# Patient Record
Sex: MALE | Race: WHITE | NOT HISPANIC OR LATINO | Employment: OTHER | URBAN - METROPOLITAN AREA
[De-identification: names, ages, dates, MRNs, and addresses within clinical notes are randomized per-mention and may not be internally consistent; named-entity substitution may affect disease eponyms.]

---

## 2017-06-08 ENCOUNTER — OFFICE VISIT - HEALTHEAST (OUTPATIENT)
Dept: INTERNAL MEDICINE | Facility: CLINIC | Age: 67
End: 2017-06-08

## 2017-06-08 DIAGNOSIS — B19.20 UNSPECIFIED VIRAL HEPATITIS C WITHOUT HEPATIC COMA: ICD-10-CM

## 2017-06-08 DIAGNOSIS — R03.0 ELEVATED BP WITHOUT DIAGNOSIS OF HYPERTENSION: ICD-10-CM

## 2017-06-08 DIAGNOSIS — R51.9 HEADACHE: ICD-10-CM

## 2017-06-08 DIAGNOSIS — R09.81 SINUS CONGESTION: ICD-10-CM

## 2017-06-08 DIAGNOSIS — E07.9 THYROID CONDITION: ICD-10-CM

## 2017-07-05 ENCOUNTER — AMBULATORY - HEALTHEAST (OUTPATIENT)
Dept: LAB | Facility: CLINIC | Age: 67
End: 2017-07-05

## 2017-07-05 DIAGNOSIS — R79.89 INCREASED FOLLICLE STIMULATING HORMONE (FSH) LEVEL: ICD-10-CM

## 2017-07-05 DIAGNOSIS — R53.83 OTHER MALAISE AND FATIGUE: ICD-10-CM

## 2017-07-05 DIAGNOSIS — R53.81 OTHER MALAISE AND FATIGUE: ICD-10-CM

## 2017-07-19 ENCOUNTER — AMBULATORY - HEALTHEAST (OUTPATIENT)
Dept: LAB | Facility: CLINIC | Age: 67
End: 2017-07-19

## 2017-07-19 DIAGNOSIS — M54.50 LUMBAGO: ICD-10-CM

## 2017-07-19 DIAGNOSIS — R27.0 ATAXIA: ICD-10-CM

## 2017-07-19 DIAGNOSIS — R68.82 DECREASED LIBIDO: ICD-10-CM

## 2017-07-19 DIAGNOSIS — R53.83 OTHER MALAISE AND FATIGUE: ICD-10-CM

## 2017-07-19 DIAGNOSIS — G44.89 ALLERGIC HEADACHE: ICD-10-CM

## 2017-07-19 DIAGNOSIS — R53.81 OTHER MALAISE AND FATIGUE: ICD-10-CM

## 2017-07-21 LAB
MISCELLANEOUS TEST DEPT. - HE HISTORICAL: NORMAL
PERFORMING LAB: NORMAL
SPECIMEN STATUS: NORMAL
TEST NAME: NORMAL

## 2017-07-25 ENCOUNTER — AMBULATORY - HEALTHEAST (OUTPATIENT)
Dept: LAB | Facility: CLINIC | Age: 67
End: 2017-07-25

## 2017-07-25 DIAGNOSIS — M54.50 LUMBAGO: ICD-10-CM

## 2017-07-25 DIAGNOSIS — G44.89 ALLERGIC HEADACHE: ICD-10-CM

## 2017-07-25 DIAGNOSIS — R27.0 ATAXIA: ICD-10-CM

## 2017-07-25 DIAGNOSIS — R53.83 FATIGUE: ICD-10-CM

## 2017-07-25 DIAGNOSIS — R68.82 DECREASED LIBIDO: ICD-10-CM

## 2017-07-25 LAB
IGA SERPL-MCNC: 138 MG/DL (ref 65–400)
MISCELLANEOUS TEST DEPT. - HE HISTORICAL: NORMAL
PERFORMING LAB: NORMAL
PSA SERPL-MCNC: 0.4 NG/ML (ref 0–4.5)
SPECIMEN STATUS: NORMAL
TEST NAME: NORMAL

## 2017-07-26 LAB
CHROMOGRANIN A, S - HISTORICAL: 53 NG/ML
HBA1C MFR BLD: 5.6 % (ref 4.2–6.1)
MISCELLANEOUS TEST DEPT. - HE HISTORICAL: NORMAL
PERFORMING LAB: NORMAL
SPECIMEN STATUS: NORMAL
TEST NAME: NORMAL

## 2017-07-28 LAB — TOTAL IGE - HISTORICAL: 4.27 KU/L (ref 0–100)

## 2017-07-29 LAB
MISCELLANEOUS TEST DEPT. - HE HISTORICAL: NORMAL
MISCELLANEOUS TEST DEPT. - HE HISTORICAL: NORMAL
PERFORMING LAB: NORMAL
PERFORMING LAB: NORMAL
SPECIMEN STATUS: NORMAL
SPECIMEN STATUS: NORMAL
TEST NAME: NORMAL
TEST NAME: NORMAL

## 2018-07-02 ENCOUNTER — AMBULATORY - HEALTHEAST (OUTPATIENT)
Dept: LAB | Facility: CLINIC | Age: 68
End: 2018-07-02

## 2018-07-02 DIAGNOSIS — G44.89 ALLERGIC HEADACHE: ICD-10-CM

## 2018-07-02 DIAGNOSIS — R89.8 ABNORMAL KARYOTYPE: ICD-10-CM

## 2018-07-02 DIAGNOSIS — R89.1 ABNORMAL LEVEL OF HORMONES IN SPECIMENS FROM OTH ORG/TISS: ICD-10-CM

## 2018-07-02 DIAGNOSIS — R53.83 FATIGUE: ICD-10-CM

## 2018-07-02 LAB
25(OH)D3 SERPL-MCNC: 53.2 NG/ML (ref 30–80)
CORTIS SERPL-MCNC: 9.1 UG/DL

## 2018-07-03 LAB — DHEA-S SERPL-MCNC: 238 UG/DL (ref 28–175)

## 2018-07-05 LAB
AC/FC RATIO: 0.2 (ref 0.1–0.8)
ACYLCARNITINE (AC): 14 NMOL/ML (ref 5–30)
FREE (FC): 60 NMOL/ML (ref 25–54)
GLIADIN IGA SER-ACNC: 0.6 U/ML
GLIADIN IGG SER-ACNC: <0.4 U/ML
INTERPRETATION: ABNORMAL
TOTAL: 74 NMOL/ML (ref 34–78)
TTG IGA SER-ACNC: 0.1 U/ML
TTG IGG SER-ACNC: <0.6 U/ML

## 2019-10-01 ENCOUNTER — HEALTH MAINTENANCE LETTER (OUTPATIENT)
Age: 69
End: 2019-10-01

## 2019-12-15 ENCOUNTER — HEALTH MAINTENANCE LETTER (OUTPATIENT)
Age: 69
End: 2019-12-15

## 2020-02-20 ENCOUNTER — COMMUNICATION - HEALTHEAST (OUTPATIENT)
Dept: FAMILY MEDICINE | Facility: CLINIC | Age: 70
End: 2020-02-20

## 2020-02-20 ENCOUNTER — OFFICE VISIT - HEALTHEAST (OUTPATIENT)
Dept: FAMILY MEDICINE | Facility: CLINIC | Age: 70
End: 2020-02-20

## 2020-02-20 DIAGNOSIS — E07.9 THYROID CONDITION: ICD-10-CM

## 2020-02-20 DIAGNOSIS — Z01.818 PREOP EXAMINATION: ICD-10-CM

## 2020-02-20 DIAGNOSIS — I10 ESSENTIAL HYPERTENSION: ICD-10-CM

## 2020-02-20 DIAGNOSIS — H25.013 CORTICAL AGE-RELATED CATARACT OF BOTH EYES: ICD-10-CM

## 2020-02-20 DIAGNOSIS — F11.20 OPIOID TYPE DEPENDENCE, CONTINUOUS (H): ICD-10-CM

## 2020-02-20 DIAGNOSIS — H43.819 PVD (POSTERIOR VITREOUS DETACHMENT), UNSPECIFIED LATERALITY: ICD-10-CM

## 2020-02-20 DIAGNOSIS — Z12.11 SPECIAL SCREENING FOR MALIGNANT NEOPLASMS, COLON: ICD-10-CM

## 2020-02-20 DIAGNOSIS — Z13.220 LIPID SCREENING: ICD-10-CM

## 2020-02-20 DIAGNOSIS — B18.2 CHRONIC HEPATITIS C WITHOUT HEPATIC COMA (H): ICD-10-CM

## 2020-02-20 LAB
ALBUMIN SERPL-MCNC: 4.3 G/DL (ref 3.5–5)
ALP SERPL-CCNC: 74 U/L (ref 45–120)
ALT SERPL W P-5'-P-CCNC: 25 U/L (ref 0–45)
ANION GAP SERPL CALCULATED.3IONS-SCNC: 6 MMOL/L (ref 5–18)
AST SERPL W P-5'-P-CCNC: 19 U/L (ref 0–40)
ATRIAL RATE - MUSE: 74 BPM
BILIRUB SERPL-MCNC: 1.1 MG/DL (ref 0–1)
BUN SERPL-MCNC: 21 MG/DL (ref 8–28)
CALCIUM SERPL-MCNC: 10 MG/DL (ref 8.5–10.5)
CHLORIDE BLD-SCNC: 101 MMOL/L (ref 98–107)
CHOLEST SERPL-MCNC: 255 MG/DL
CO2 SERPL-SCNC: 34 MMOL/L (ref 22–31)
CREAT SERPL-MCNC: 1.12 MG/DL (ref 0.7–1.3)
DIASTOLIC BLOOD PRESSURE - MUSE: NORMAL
ERYTHROCYTE [DISTWIDTH] IN BLOOD BY AUTOMATED COUNT: 11.9 % (ref 11–14.5)
FASTING STATUS PATIENT QL REPORTED: YES
GFR SERPL CREATININE-BSD FRML MDRD: >60 ML/MIN/1.73M2
GLUCOSE BLD-MCNC: 105 MG/DL (ref 70–125)
HCT VFR BLD AUTO: 47.2 % (ref 40–54)
HDLC SERPL-MCNC: 50 MG/DL
HGB BLD-MCNC: 15.9 G/DL (ref 14–18)
INTERPRETATION ECG - MUSE: NORMAL
LDLC SERPL CALC-MCNC: 186 MG/DL
MCH RBC QN AUTO: 30.5 PG (ref 27–34)
MCHC RBC AUTO-ENTMCNC: 33.6 G/DL (ref 32–36)
MCV RBC AUTO: 91 FL (ref 80–100)
P AXIS - MUSE: 20 DEGREES
PLATELET # BLD AUTO: 261 THOU/UL (ref 140–440)
PMV BLD AUTO: 7 FL (ref 7–10)
POTASSIUM BLD-SCNC: 5.3 MMOL/L (ref 3.5–5)
PR INTERVAL - MUSE: 130 MS
PROT SERPL-MCNC: 7.6 G/DL (ref 6–8)
QRS DURATION - MUSE: 90 MS
QT - MUSE: 366 MS
QTC - MUSE: 406 MS
R AXIS - MUSE: 29 DEGREES
RBC # BLD AUTO: 5.21 MILL/UL (ref 4.4–6.2)
SODIUM SERPL-SCNC: 141 MMOL/L (ref 136–145)
SYSTOLIC BLOOD PRESSURE - MUSE: NORMAL
T AXIS - MUSE: 27 DEGREES
TRIGL SERPL-MCNC: 95 MG/DL
TSH SERPL DL<=0.005 MIU/L-ACNC: 2.57 UIU/ML (ref 0.3–5)
VENTRICULAR RATE- MUSE: 74 BPM
WBC: 5.1 THOU/UL (ref 4–11)

## 2020-02-20 ASSESSMENT — MIFFLIN-ST. JEOR: SCORE: 1655.56

## 2020-02-26 ENCOUNTER — COMMUNICATION - HEALTHEAST (OUTPATIENT)
Dept: FAMILY MEDICINE | Facility: CLINIC | Age: 70
End: 2020-02-26

## 2020-02-26 ENCOUNTER — COMMUNICATION - HEALTHEAST (OUTPATIENT)
Dept: SCHEDULING | Facility: CLINIC | Age: 70
End: 2020-02-26

## 2020-02-26 ENCOUNTER — OFFICE VISIT - HEALTHEAST (OUTPATIENT)
Dept: FAMILY MEDICINE | Facility: CLINIC | Age: 70
End: 2020-02-26

## 2020-02-26 DIAGNOSIS — R42 VERTIGO: ICD-10-CM

## 2020-02-26 LAB
ANION GAP SERPL CALCULATED.3IONS-SCNC: 11 MMOL/L (ref 5–18)
BUN SERPL-MCNC: 21 MG/DL (ref 8–28)
CALCIUM SERPL-MCNC: 9.7 MG/DL (ref 8.5–10.5)
CHLORIDE BLD-SCNC: 103 MMOL/L (ref 98–107)
CO2 SERPL-SCNC: 26 MMOL/L (ref 22–31)
CREAT SERPL-MCNC: 0.97 MG/DL (ref 0.7–1.3)
GFR SERPL CREATININE-BSD FRML MDRD: >60 ML/MIN/1.73M2
GLUCOSE BLD-MCNC: 126 MG/DL (ref 70–125)
POTASSIUM BLD-SCNC: 3.9 MMOL/L (ref 3.5–5)
SODIUM SERPL-SCNC: 140 MMOL/L (ref 136–145)

## 2020-07-16 ENCOUNTER — COMMUNICATION - HEALTHEAST (OUTPATIENT)
Dept: FAMILY MEDICINE | Facility: CLINIC | Age: 70
End: 2020-07-16

## 2020-07-16 DIAGNOSIS — I10 ESSENTIAL HYPERTENSION: ICD-10-CM

## 2020-10-28 ENCOUNTER — COMMUNICATION - HEALTHEAST (OUTPATIENT)
Dept: FAMILY MEDICINE | Facility: CLINIC | Age: 70
End: 2020-10-28

## 2020-10-28 DIAGNOSIS — I10 ESSENTIAL HYPERTENSION: ICD-10-CM

## 2021-01-15 ENCOUNTER — HEALTH MAINTENANCE LETTER (OUTPATIENT)
Age: 71
End: 2021-01-15

## 2021-02-11 ENCOUNTER — TRANSFERRED RECORDS (OUTPATIENT)
Dept: HEALTH INFORMATION MANAGEMENT | Facility: CLINIC | Age: 71
End: 2021-02-11

## 2021-02-15 ENCOUNTER — COMMUNICATION - HEALTHEAST (OUTPATIENT)
Dept: FAMILY MEDICINE | Facility: CLINIC | Age: 71
End: 2021-02-15

## 2021-04-13 ENCOUNTER — TRANSFERRED RECORDS (OUTPATIENT)
Dept: HEALTH INFORMATION MANAGEMENT | Facility: CLINIC | Age: 71
End: 2021-04-13

## 2021-04-27 ENCOUNTER — TRANSFERRED RECORDS (OUTPATIENT)
Dept: HEALTH INFORMATION MANAGEMENT | Facility: CLINIC | Age: 71
End: 2021-04-27

## 2021-05-26 ENCOUNTER — RECORDS - HEALTHEAST (OUTPATIENT)
Dept: ADMINISTRATIVE | Facility: CLINIC | Age: 71
End: 2021-05-26

## 2021-05-26 ENCOUNTER — TRANSFERRED RECORDS (OUTPATIENT)
Dept: HEALTH INFORMATION MANAGEMENT | Facility: CLINIC | Age: 71
End: 2021-05-26

## 2021-05-27 ENCOUNTER — RECORDS - HEALTHEAST (OUTPATIENT)
Dept: ADMINISTRATIVE | Facility: CLINIC | Age: 71
End: 2021-05-27

## 2021-05-28 ENCOUNTER — RECORDS - HEALTHEAST (OUTPATIENT)
Dept: ADMINISTRATIVE | Facility: CLINIC | Age: 71
End: 2021-05-28

## 2021-05-29 ENCOUNTER — RECORDS - HEALTHEAST (OUTPATIENT)
Dept: ADMINISTRATIVE | Facility: CLINIC | Age: 71
End: 2021-05-29

## 2021-05-30 ENCOUNTER — RECORDS - HEALTHEAST (OUTPATIENT)
Dept: ADMINISTRATIVE | Facility: CLINIC | Age: 71
End: 2021-05-30

## 2021-06-04 VITALS
HEART RATE: 80 BPM | HEIGHT: 69 IN | SYSTOLIC BLOOD PRESSURE: 160 MMHG | WEIGHT: 204.6 LBS | DIASTOLIC BLOOD PRESSURE: 90 MMHG | BODY MASS INDEX: 30.3 KG/M2

## 2021-06-04 VITALS
SYSTOLIC BLOOD PRESSURE: 134 MMHG | DIASTOLIC BLOOD PRESSURE: 76 MMHG | BODY MASS INDEX: 30.26 KG/M2 | WEIGHT: 202 LBS | HEART RATE: 92 BPM

## 2021-06-06 NOTE — TELEPHONE ENCOUNTER
"  Able to get patient scheduled for an appointment at 2:20p today.  [facility] NURSE ADVISORS  Reason for Disposition    Spinning or tilting sensation (vertigo) present now    Answer Assessment - Initial Assessment Questions  1. DESCRIPTION: \"Describe your dizziness.\"      Vertigo off and on  2. LIGHTHEADED: \"Do you feel lightheaded?\" (e.g., somewhat faint, woozy, weak upon standing)      Not really  3. VERTIGO: \"Do you feel like either you or the room is spinning or tilting?\" (i.e. vertigo)      yes  4. SEVERITY: \"How bad is it?\"  \"Do you feel like you are going to faint?\" \"Can you stand and walk?\"    - MILD - walking normally    - MODERATE - interferes with normal activities (e.g., work, school)     - SEVERE - unable to stand, requires support to walk, feels like passing out now.       moderate  5. ONSET:  \"When did the dizziness begin?\"      Yesterday morning  6. AGGRAVATING FACTORS: \"Does anything make it worse?\" (e.g., standing, change in head position)      Head movement or lying down and getting up   7. HEART RATE: \"Can you tell me your heart rate?\" \"How many beats in 15 seconds?\"  (Note: not all patients can do this)        normal  8. CAUSE: \"What do you think is causing the dizziness?\"      Head movement  9. RECURRENT SYMPTOM: \"Have you had dizziness before?\" If so, ask: \"When was the last time?\" \"What happened that time?\"      none  10. OTHER SYMPTOMS: \"Do you have any other symptoms?\" (e.g., fever, chest pain, vomiting, diarrhea, bleeding)        no  11. PREGNANCY: \"Is there any chance you are pregnant?\" \"When was your last menstrual period?\"        no    Protocols used: DIZZINESS-A-OH      "

## 2021-06-06 NOTE — TELEPHONE ENCOUNTER
Reached out to patient and left a message to return phone call. Please relay the below message to patient when he calls back. Thank you,   Sisys La

## 2021-06-06 NOTE — TELEPHONE ENCOUNTER
Adv patient to be seen either at urgent care or clinic visit    Heidi Chairez MD 2/26/2020 1:55 PM

## 2021-06-06 NOTE — TELEPHONE ENCOUNTER
RN cannot approve Refill Request    Requesting 90 day supply    RN can NOT refill this medication PCP messaged that patient is overdue for Labs. Last office visit: 2/20/2020 Heidi Chairez MD Last Physical: Visit date not found Last MTM visit: Visit date not found Last visit same specialty: 2/20/2020 Heidi Chairez MD.  Next visit within 3 mo: Visit date not found  Next physical within 3 mo: Visit date not found      Leonor Trivedi, [facility] Triage/Med Refill 2/24/2020    Requested Prescriptions   Pending Prescriptions Disp Refills     lisinopriL (PRINIVIL,ZESTRIL) 10 MG tablet [Pharmacy Med Name: LISINOPRIL 10MG TABLETS] 90 tablet 0     Sig: TAKE 1 TABLET BY MOUTH DAILY       Ace Inhibitors Refill Protocol Failed - 2/20/2020  2:07 PM        Failed - Serum Potassium in past 12 months     Lab Results   Component Value Date    Potassium 5.3 (H) 02/20/2020             Failed - Serum Creatinine in past 12 months     Creatinine   Date Value Ref Range Status   02/20/2020 1.12 0.70 - 1.30 mg/dL Final             Passed - PCP or prescribing provider visit in past 12 months       Last office visit with prescriber/PCP: 2/20/2020 Heidi Chairez MD OR same dept: 2/20/2020 Heidi Chairez MD OR same specialty: 2/20/2020 Heidi Chairez MD  Last physical: Visit date not found Last MTM visit: Visit date not found   Next visit within 3 mo: Visit date not found  Next physical within 3 mo: Visit date not found  Prescriber OR PCP: Heidi Chairez MD  Last diagnosis associated with med order: 1. Essential hypertension  - lisinopriL (PRINIVIL,ZESTRIL) 10 MG tablet [Pharmacy Med Name: LISINOPRIL 10MG TABLETS]; TAKE 1 TABLET BY MOUTH DAILY  Dispense: 90 tablet; Refill: 0    If protocol passes may refill for 12 months if within 3 months of last provider visit (or a total of 15 months).             Passed - Blood pressure filed in past 12 months     BP Readings from Last 1 Encounters:   02/20/20 160/90

## 2021-06-06 NOTE — TELEPHONE ENCOUNTER
Symptom  Describe your symptoms: dizzieness  Any pain: no  New/Ongoing: New  How long have you been having symptoms: 2  day(s)  Have you been seen for this:  No  Appointment offered?: n/a  Triage offered?: Transferred  Home remedies tried: n/a  Requested Pharmacy: n/a  Okay to leave a detailed message? Yes      Who is calling:  Patient   Reason for Call:  Caller stated that with his symptoms that he is having he is afraid that this surgery will get reschedule. Caller stated that he does not want to reschedule. Caller stated that his headaches are very concerning due to being afraid to even falling asleep and not waking up.

## 2021-06-06 NOTE — PROGRESS NOTES
"ASSESSMENT/PLAN:  Vertigo  Pleasant 69 y.o.  male presented with BPV.  Provided him with modified epley's maneuver to do at home.  Meclizine prn for symptoms.  BMP was unremarkable.  The patient may continue with OTC symptomatic treatment.  Follow up with primary care provider if the patient is not better in 1-2 weeks.  The patient verbalized understanding and agreed with the plan.  -     Basic Metabolic Panel  -     meclizine (ANTIVERT) 25 mg tablet; Take 1 tablet (25 mg total) by mouth 3 (three) times a day as needed for dizziness or nausea.      Orders Placed This Encounter   Procedures     Basic Metabolic Panel     [facility]     CHIEF COMPLAINT:  Chief Complaint   Patient presents with     Dizziness     x  2 days, Room is spinning. Never had it before     HISTORY OF PRESENT ILLNESS:  Donovan is a 69 y.o. male presenting to the clinic today for dizziness. He is accompanied by his wife, Karla. Yesterday morning when he returned to bed after using the bathroom, he laid down on his left side and the room began to spin. Simultaneously, he felt \"something\" shoot down his arms bilaterally. He sat on the edge of the bed which made the spinning stop. Throughout the day he did not have anymore spinning, but he felt nauseous and dizzy. Last night, he laid on his right side without any difficulties. During the night, he switched to his left side and he did not have the spinning sensation. When he woke up this morning, he felt the spinning and shooting sensations again. Today in clinic he is nauseous, fatigued and dizzy. He denies any rhinorrhea or cough. He recently started taking lisinopril on 02/21/2020. He has eye surgery next Tuesday and he is concerned that this vertigo will prevent him from getting the surgery. He insists on receiving exercises for crystal replacement.     REVIEW OF SYSTEMS:   Constitutional: Negative.   HENT: Negative.   Eyes: Negative.   Respiratory: Negative.   Cardiovascular: Negative. "   Gastrointestinal: Negative.   Endocrine: Negative.   Genitourinary: Negative.   Musculoskeletal: Negative.   Skin: Negative.   Allergic/Immunologic: Negative.   Neurological: Negative.   Hematological: Negative.   Psychiatric/Behavioral: Negative.   All other systems are negative.    TOBACCO USE:  Social History     Tobacco Use   Smoking Status Never Smoker   Smokeless Tobacco Never Used     VITALS:  Vitals:    02/26/20 1427   BP: 134/76   Patient Site: Left Arm   Patient Position: Sitting   Cuff Size: Adult Regular   Pulse: 92   Weight: 202 lb (91.6 kg)     Wt Readings from Last 3 Encounters:   02/26/20 202 lb (91.6 kg)   02/20/20 204 lb 9.6 oz (92.8 kg)   11/30/18 190 lb (86.2 kg)     PHYSICAL EXAM:  Constitutional: Patient is oriented to person, place, and time. Patient appears well-developed and well-nourished. No distress.   Head: Normocephalic and atraumatic.   Right Ear: External ear normal.   Left Ear: External ear normal.   Nose: Nose normal.   Cardiovascular: Normal rate, regular rhythm, normal heart sounds and intact distal pulses. No murmur heard.   Pulmonary/Chest: Effort normal and breath sounds normal. No stridor. No respiratory distress. Patient has no wheezes, no rales, exhibits no tenderness.   Neurological: Patient is alert and oriented to person, place, and time. Patient has normal reflexes. No cranial nerve deficit. Coordination normal.   Skin: Skin is warm and dry. No rash noted. Patient is not diaphoretic. No erythema. No pallor.    Results for orders placed or performed in visit on 02/26/20   Basic Metabolic Panel   Result Value Ref Range    Sodium 140 136 - 145 mmol/L    Potassium 3.9 3.5 - 5.0 mmol/L    Chloride 103 98 - 107 mmol/L    CO2 26 22 - 31 mmol/L    Anion Gap, Calculation 11 5 - 18 mmol/L    Glucose 126 (H) 70 - 125 mg/dL    Calcium 9.7 8.5 - 10.5 mg/dL    BUN 21 8 - 28 mg/dL    Creatinine 0.97 0.70 - 1.30 mg/dL    GFR MDRD Af Amer >60 >60 mL/min/1.73m2    GFR MDRD Non Af Amer  >60 >60 mL/min/1.73m2      ADDITIONAL HISTORY SUMMARIZED (2): Reviewed RN triage note from 02/26/2020 about vertigo. Reviewed pre-op note from 02/20/2020 about vitrectomy.   DECISION TO OBTAIN EXTRA INFORMATION (1): None.   RADIOLOGY TESTS (1): None.  LABS (1): Reviewed labs from 02/20/2020 and ordered labs today.   MEDICINE TESTS (1): None.  INDEPENDENT REVIEW (2 each): None.     The visit lasted a total of 25 minutes face to face with the patient. Over 50% of the time was spent counseling and educating the patient about vertigo and hypertension medications.    IAnita, am scribing for and in the presence of, Dr. Key.    I, Dr. Key, personally performed the services described in this documentation, as scribed by Anita Lemon in my presence, and it is both accurate and complete.    MEDICATIONS:  Current Outpatient Medications   Medication Sig Dispense Refill     aspirin (OSWALD ASPIRIN) 325 MG tablet Take 1 tablet by mouth.       cyclobenzaprine (FLEXERIL) 10 MG tablet Take 1 tablet by mouth 2 (two) times a day as needed.       lisinopriL (PRINIVIL,ZESTRIL) 10 MG tablet TAKE 1 TABLET BY MOUTH DAILY 90 tablet 0     HYDROcodone-acetaminophen (NORCO )  mg per tablet Take 1 tablet by mouth every 8 (eight) hours.       meclizine (ANTIVERT) 25 mg tablet Take 1 tablet (25 mg total) by mouth 3 (three) times a day as needed for dizziness or nausea. 30 tablet 0     No current facility-administered medications for this visit.        Total data points:3

## 2021-06-09 ENCOUNTER — OFFICE VISIT - HEALTHEAST (OUTPATIENT)
Dept: FAMILY MEDICINE | Facility: CLINIC | Age: 71
End: 2021-06-09

## 2021-06-09 DIAGNOSIS — Z01.810 PREOP CARDIOVASCULAR EXAM: ICD-10-CM

## 2021-06-09 DIAGNOSIS — M79.7 FIBROMYALGIA: ICD-10-CM

## 2021-06-09 DIAGNOSIS — M53.3 SACROILIAC PAIN: ICD-10-CM

## 2021-06-09 DIAGNOSIS — L57.0 AK (ACTINIC KERATOSIS): ICD-10-CM

## 2021-06-09 DIAGNOSIS — Z13.228 SCREENING FOR METABOLIC DISORDER: ICD-10-CM

## 2021-06-09 DIAGNOSIS — H25.013 CORTICAL AGE-RELATED CATARACT OF BOTH EYES: ICD-10-CM

## 2021-06-09 DIAGNOSIS — Z12.11 SCREEN FOR COLON CANCER: ICD-10-CM

## 2021-06-09 DIAGNOSIS — Z23 NEED FOR TD VACCINE: ICD-10-CM

## 2021-06-09 DIAGNOSIS — B18.2 CHRONIC HEPATITIS C WITHOUT HEPATIC COMA (H): ICD-10-CM

## 2021-06-09 DIAGNOSIS — F11.20 OPIOID TYPE DEPENDENCE, CONTINUOUS (H): ICD-10-CM

## 2021-06-09 LAB
ALBUMIN SERPL-MCNC: 4 G/DL (ref 3.5–5)
ALP SERPL-CCNC: 79 U/L (ref 45–120)
ALT SERPL W P-5'-P-CCNC: 14 U/L (ref 0–45)
ANION GAP SERPL CALCULATED.3IONS-SCNC: 11 MMOL/L (ref 5–18)
AST SERPL W P-5'-P-CCNC: 18 U/L (ref 0–40)
BILIRUB SERPL-MCNC: 1.1 MG/DL (ref 0–1)
BUN SERPL-MCNC: 20 MG/DL (ref 8–28)
CALCIUM SERPL-MCNC: 9.1 MG/DL (ref 8.5–10.5)
CHLORIDE BLD-SCNC: 101 MMOL/L (ref 98–107)
CHOLEST SERPL-MCNC: 222 MG/DL
CO2 SERPL-SCNC: 26 MMOL/L (ref 22–31)
CREAT SERPL-MCNC: 0.89 MG/DL (ref 0.7–1.3)
FASTING STATUS PATIENT QL REPORTED: ABNORMAL
GFR SERPL CREATININE-BSD FRML MDRD: >60 ML/MIN/1.73M2
GLUCOSE BLD-MCNC: 86 MG/DL (ref 70–125)
HBA1C MFR BLD: 5.6 %
HDLC SERPL-MCNC: 47 MG/DL
IGA SERPL-MCNC: 1090 MG/DL
IGA SERPL-MCNC: 126 MG/DL (ref 65–400)
IGM SERPL-MCNC: 145 MG/DL (ref 60–280)
LDLC SERPL CALC-MCNC: 154 MG/DL
POTASSIUM BLD-SCNC: 4.9 MMOL/L (ref 3.5–5)
PROT SERPL-MCNC: 7.2 G/DL (ref 6–8)
SODIUM SERPL-SCNC: 138 MMOL/L (ref 136–145)
TRIGL SERPL-MCNC: 104 MG/DL

## 2021-06-09 ASSESSMENT — MIFFLIN-ST. JEOR: SCORE: 1624.43

## 2021-06-11 NOTE — PROGRESS NOTES
"ASSESSMENT/PLAN:  1. Elevated BP without diagnosis of hypertension  Blood pressures have been excellent before.  He has had concerns about dizziness for quite some time and therefore his blood pressure really has nothing to do with that.  Does not have anything do with his dizziness sensation either.  He was actually treated with Florinef which would obviously raise his blood pressure back over a year ago.  He has not still taking it however.  No medication is necessary at this moment.  Will check lab work however is it has been some time.  - HM2(CBC w/o Differential)  - Comprehensive Metabolic Panel  - Urinalysis    2. Thyroid condition  This is a diagnosis in his record and will check a TSH today to make certain there is no significant abnormality affecting either his blood pressure or dizziness sensation.  - Thyroid Stimulating Hormone (TSH)    3. Hepatitis, C Virus  According to the patient it has\" resolved\".  Will check liver function today as well.  - Comprehensive Metabolic Panel    4. Sinus congestion  Unfortunately he may have had some allergic condition and then started using Afrin nasal spray twice per day.  He states when he tries to stop if course has rebound congestion.  He has seen ENT and they have been unable to help him.  That is what started the steroid spray.  Is also used a Cathryn pot and saline spray.  Encouraged that he needs to wean himself off of the Afrin nasal spray and made some suggestions below.  Continue with the steroid nasal spray.  Follow-up with ENT if he has persistent sinus symptoms.  Encourage that his sinus symptoms have nothing to do with his dizziness.    5. Headache  He was concerned that this is blood pressure related and I think it is not likely.  I am not sure if it is related to the Afrin or the rebound or the other chronic use of meds.  His blood pressure is not high enough to cause significant headache symptoms.    Dizziness sensation was not addressed today as we do " not with the other mentioned symptoms.  He is a difficult historian related to the number of things that he tries to pull into the visit and his story.  If his symptoms persist, I suggested he see a physician specifically about dizziness and be able to spend a visit just going into lodging into that.    Patient Instructions   Decrease Afrin nasal spray to one spray each side twice daily for one week, then one spray each side once daily for one week, then stop.    Continue using Flonase nasal spray daily.     Use saline nasal spray 3-4 times per day.       Return if symptoms worsen or fail to improve.    CHIEF COMPLAINT:  Chief Complaint   Patient presents with     concerns of elavated blood pressure     Headache     for the past couple of weeks       HISTORY OF PRESENT ILLNESS:  Donovan Yi is a 67 y.o. male patient without PCP presenting to the clinic today with headaches. He states that he has been congested but he never blows anything out of his nose. He does have sputum in his throat. His symptoms began in the middle of winter. He uses Flonase nasal spray daily as directed. He has also been using Afrin nasal spray twice daily. He states that he feels a lot better when he uses the Afrin nasal spray. He mentions that he has also had dizziness. He tried using saline nasal washes as well, similar to a Neti pot, but it did not seem helpful. He states that he was not getting any relief from his stuffiness and dizziness. He mentions that he was seen by ENT and that is how he was started on Flonase. He states multiple times during the visit that his sinuses are not under control.     Blood Pressure: His blood pressure is 142/78 in clinic today. He states that all of his life his blood pressure has been around 120/80. Over the last couple of months his blood pressures have been elevated. He mentions that this seemed to start occurring at the same time he developed troubles with his sinuses (see above).     He  "was seen by an endocrinologist last year and he was placed on Florinef. He mentions that he has not taken the medication in quite some time. He mentions that he was placed on the medication for his dizziness and hypotension.    He is a somewhat difficult historian    REVIEW OF SYSTEMS:   Comprehensive review of systems negative except as noted above.    PFSH:  Reviewed as above.     TOBACCO USE:  History   Smoking Status     Never Smoker   Smokeless Tobacco     Not on file       VITALS:  Vitals:    06/08/17 1601 06/08/17 1602   BP: 154/82 142/78   Pulse: 72      Wt Readings from Last 3 Encounters:   12/10/16 180 lb (81.6 kg)   01/08/16 161 lb 11.2 oz (73.3 kg)   12/03/15 155 lb 12.8 oz (70.7 kg)     Estimated body mass index is 26.2 kg/(m^2) as calculated from the following:    Height as of 12/10/16: 5' 9.5\" (1.765 m).    Weight as of 12/10/16: 180 lb (81.6 kg).    PHYSICAL EXAM:  General Appearance: Alert, cooperative, no distress, appears stated age.  HEENT: EMOI, fundi not observed, TMs normal, mouth and throat without lesions. Maxillary and frontal sinuses non-tender to percussion.   Neck: Supple without adenopathy.  Lungs: Clear to auscultation bilaterally, good air movement.  Heart: Regular rate and rhythm, S1 and S2 normal, no murmur or bruit.  Psychiatric:  He has a normal mood and affect.     Notes Reviewed, additional history from source other than patient (2 TOTAL): Reviewed endocrinology note from 1/08/16; hypotension, on Florinef.     Accessed Care Everywhere, Requested Records, Consult with Physician (1 TOTAL): None.     Radiology tests summarized or ordered (XR, CT, MRI, DXA, US) (1 TOTAL): None.    Labs reviewed or ordered (1 TOTAL): Reviewed labs from 12/3/15; TSH. Ordered HM2, CMP, UA, and TSH labs today.    Medicine tests reviewed or ordered (ECG, echocardiogram, colonoscopy, EGD, venous US) (1 TOTAL): None.    Independent review of ECG or XR (2 EACH): None.      The visit lasted a total of 11 " minutes face to face with the patient. Over 50% of the time was spent counseling and educating the patient about sinus congestion and dizziness.    I, Hafsa Guerrier, am scribing for and in the presence of, Dr. David.    I, Dr. David, personally performed the services described in this documentation, as scribed by Hafsa Guerrier in my presence, and it is both accurate and complete.    MEDICATIONS:  Current Outpatient Prescriptions   Medication Sig Dispense Refill     HYDROcodone-acetaminophen (NORCO )  mg per tablet Take 1 tablet by mouth every 8 (eight) hours.       No current facility-administered medications for this visit.        Total data points: 3

## 2021-06-12 NOTE — TELEPHONE ENCOUNTER
Incoming non-adherent form from [facility] in regards to Lisinopril.     Left message to call back for: Donovan   Information to relay to patient:  Is pt still taking this RX? If so, does he want a refill? I do not see any f/u directions from provider at last visit so undetermined if he needs to be seen yet.

## 2021-06-12 NOTE — TELEPHONE ENCOUNTER
Spoke to pt and he is still taking the lisinopril medication which was last filled on 7/17/20. He is needing a refill but would like it sent to the [facility]'s pharmacy on his file.   He is unsure when he was supposed to follow up but requesting if follow up is needed it be a virtual visit.    Medication prepped.

## 2021-06-16 PROBLEM — H25.013 CORTICAL AGE-RELATED CATARACT OF BOTH EYES: Status: ACTIVE | Noted: 2020-02-20

## 2021-06-16 PROBLEM — M53.3 SACROILIAC PAIN: Status: ACTIVE | Noted: 2021-06-09

## 2021-06-16 PROBLEM — B18.2 CHRONIC HEPATITIS C WITHOUT HEPATIC COMA (H): Status: ACTIVE | Noted: 2021-06-09

## 2021-06-16 PROBLEM — H43.819 PVD (POSTERIOR VITREOUS DETACHMENT), UNSPECIFIED LATERALITY: Status: ACTIVE | Noted: 2020-02-20

## 2021-06-18 NOTE — PATIENT INSTRUCTIONS - HE
Patient Instructions by Heidi Chairez MD at 2/20/2020 12:40 PM     Author: Heidi Chairez MD Service: -- Author Type: Physician    Filed: 2/20/2020  3:18 PM Encounter Date: 2/20/2020 Status: Addendum    : Heidi Chairez MD (Physician)    Related Notes: Original Note by Heidi Chairez MD (Physician) filed at 2/20/2020  1:12 PM         Patient Education     Eating Heart-Healthy Food: Using the DASH Plan    Eating for your heart doesnt have to be hard or boring. You just need to know how to make healthier choices. The DASH eating plan has been developed to help you do just that. DASH stands for Dietary Approaches to Stop Hypertension. It is a plan that has been proven to be healthier for your heart and to lower your risk for high blood pressure. It can also help lower your risk for cancer, heart disease, osteoporosis, and diabetes.  Choosing from each food group  Choose foods from each of the food groups below each day. Try to get the recommended number of servings for each food group. The serving numbers are based on a diet of 2,000 calories a day. Talk to your doctor if youre unsure about your calorie needs. Along with getting the correct servings, the DASH plan also recommends a sodium intake less than 2,300 mg per day.        Grains  Servings: 6 to 8 a day  A serving is:    1 slice bread    1 ounce dry cereal    Half a cup cooked rice, pasta or cereal  Best choices: Whole grains and any grains high in fiber. Vegetables  Servings: 4 to 5 a day  A serving is:    1 cup raw leafy vegetable    Half a cup cut-up raw or cooked vegetable    Half a cup vegetable juice  Best choices: Fresh or frozen vegetables prepared without added salt or fat.   Fruits  Servings: 4 to 5 a day  A serving is:    1 medium fruit    One-quarter cup dried fruit    Half a cup fresh, frozen, or canned fruit    Half a cup of 100% fruit juices  Best choices: A variety of fresh fruits of different colors. Whole fruits are a better choice than  fruit juices. Low-fat or fat-free dairy  Servings: 2 to 3 a day  A serving is:    1 cup milk    1 cup yogurt    One and a half ounces cheese  Best choices: Skim or 1% milk, low-fat or fat-free yogurt or buttermilk, and low-fat cheeses.         Lean meats, poultry, fish  Servings: 6 or fewer a day  A serving is:    1 ounce cooked meats, poultry, or fish    1 egg  Best choices: Lean poultry and fish. Trim away visible fat. Broil, grill, roast, or boil instead of frying. Remove skin from poultry before eating. Limit how much red meat you eat.  Nuts, seeds, beans  Servings: 4 to 5 a week  A serving is:    One-third cup nuts (one and a half ounces)    2 tablespoons nut butter or seeds    Half a cup cooked dry beans or legumes  Best choices: Dry roasted nuts with no salt added, lentils, kidney beans, garbanzo beans, and whole jarrell beans.   Fats and oils  Servings: 2 to 3 a day  A serving is:    1 teaspoon vegetable oil    1 teaspoon soft margarine    1 tablespoon mayonnaise    2 tablespoons salad dressing  Best choices: Nut and vegetable oils (nontropical vegetable oils), such as olive and canola oil. Sweets  Servings: 5 a week or fewer  A serving is:    1 tablespoon sugar, maple syrup, or honey    1 tablespoon jam or jelly    1 half-ounce jelly beans (about 15)    1 cup lemonade  Best choices: Dried fruit can be a satisfying sweet. Choose low-fat sweets. And watch your serving sizes!      For more on the DASH eating plan, visit:  www.nhlbi.nih.gov/health/health-topics/topics/dash   Date Last Reviewed: 6/1/2016 2000-2019 [employer]. [address] [place] [address], [place] [address]. All rights reserved. This information is not intended as a substitute for professional medical care. Always follow your healthcare professional's instructions.         Is a new patient to us

## 2021-06-21 NOTE — LETTER
Letter by Heidi Chairez MD at      Author: Heidi Chairez MD Service: -- Author Type: --    Filed:  Encounter Date: 2/15/2021 Status: (Other)         Donovan Yi  [address] [id number]      Dear Donovan,    As a [facility] patient, your healthcare needs are our priority. Our clinic records indicate that you are due for an Annual Wellness Visit (Physical) & Med Check.       It is Dr. Chairez's recommendation that you follow up every 3-6 months for optimal health care & medication management, and also annually for your overall general physical health. To prevent further delays in your care please contact our office or use [facility]  to schedule your appointment as soon as possible.    Sincerely,    Babita COSTELLO CMA([facility]), 2/15/2021

## 2021-06-22 ENCOUNTER — TRANSFERRED RECORDS (OUTPATIENT)
Dept: HEALTH INFORMATION MANAGEMENT | Facility: CLINIC | Age: 71
End: 2021-06-22

## 2021-06-26 NOTE — PROGRESS NOTES
[address] [address]  Dept: [phone]  Dept Fax: [phone]  Primary Provider: Heidi Chairez MD  Pre-op Performing Provider: HEIDI CHAIREZ      PREOPERATIVE EVALUATION:  Today's date: 6/9/2021    Donovan Yi is a 71 y.o. male who presents for a preoperative evaluation.    Surgical Information:  Surgery/Procedure: Left eye Surgery  Surgery Location: [facility] [place]  Surgeon: Dr. CARDOSO  Surgery Date: 06/24/21  Time of Surgery:  Where patient plans to recover: At home with family  Fax number for surgical facility: Note does not need to be faxed, will be available electronically in Epic.    Type of Anesthesia Anticipated: Local with MAC    Assessment & Plan      The proposed surgical procedure is considered LOW risk.    Donovan was seen today for pre-op exam.    Diagnoses and all orders for this visit:    Preop cardiovascular exam  Cleared for the surgery  Opioid type dependence, continuous (H)  Continue follow-up with the [facility]    Chronic hepatitis C without hepatic coma (H)  Patient was advised to find the vaccination report of his hepatitis B vaccination series if not immunize he should be getting hepatitis B vaccinations  -     Comprehensive Metabolic Panel    Cortical age-related cataract of both eyes  Comments:  plan to get new lense,    Sacroiliac pain  Comments:  chronic followed at [facility]     Fibromyalgia  -Patient wife was suggesting that he should had his immunoglobulin levels checked to see how good his immunity is as they always wondering if vaccine is good enough to protect them  Advised patient against it but per their suggestion labs are ordered       Immunoglobulins IgG, IgA, IgM    Screen for colon cancer  -     Cologuard    AK (actinic keratosis)  -Patient does have couple suspicious spots on bridge of the nose and the cheek recommend that he should see dermatologist is little hesitant but  advised that we will put the referral in and he can decide to make that appointment       ambulatory referral to Dermatology    Screening for metabolic disorder  -     Lipid Cascade  -     Glycosylated Hemoglobin A1c    Need for Td vaccine  -     Td, Preservative Free (green label)           Risks and Recommendations:  The patient has the following additional risks and recommendations for perioperative complications:   - No identified additional risk factors other than previously addressed    Medication Instructions:  Patient is to take all scheduled medications on the day of surgery    RECOMMENDATION:  APPROVAL GIVEN to proceed with proposed procedure, without further diagnostic evaluation.    Review of external notes as documented above      Subjective     HPI related to upcoming procedure:  Patient had vitreous detachment surgery last yr which didn't help the vision at all , continue to have poor vision/blurry vision.  Plan to have new lens done     Preop Questions 6/9/2021   Have you ever had a heart attack or stroke? No   Have you ever had surgery on your heart or blood vessels, such as a stent placement, a coronary artery bypass, or surgery on an artery in your head, neck, heart, or legs? No   Do you have chest pain with activity? No   Do you have a history of  heart failure? No   Do you currently have a cold, bronchitis or symptoms of other infection? No   Do you have a cough, shortness of breath, or wheezing? No   Do you or anyone in your family have previous history of blood clots? No   Do you or does anyone in your family have a serious bleeding problem such as prolonged bleeding following surgeries or cuts? No   Have you ever had problems with anemia or been told to take iron pills? No   Have you had any abnormal blood loss such as black, tarry or bloody stools? No   Have you ever had a blood transfusion? No   Are you willing to have a blood transfusion if it is medically needed before, during, or after  your surgery? NO -    Have you or any of your relatives ever had problems with anesthesia? No   Do you have sleep apnea, excessive snoring or daytime drowsiness? No   Do you have any artifical heart valves or other implanted medical devices like a pacemaker, defibrillator, or continuous glucose monitor? No   Do you have artificial joints? No   Are you allergic to latex? No         Health Care Directive:  Patient does not have a Health Care Directive or Living Will: Discussed advance care planning with patient; information given to patient to review.    Preoperative Review of :      reviewed - controlled substances reflected in medication list.    See problem list for active medical problems.  Problems all longstanding and stable, except as noted/documented.  See ROS for pertinent symptoms related to these conditions.      Review of Systems  CONSTITUTIONAL: NEGATIVE for fever, chills, change in weight  INTEGUMENTARY/SKIN: NEGATIVE for worrisome rashes, moles or lesions  EYES: NEGATIVE for vision changes or irritation  ENT/MOUTH: NEGATIVE for ear, mouth and throat problems  RESP: NEGATIVE for significant cough or SOB  BREAST: NEGATIVE for masses, tenderness or discharge  CV: NEGATIVE for chest pain, palpitations or peripheral edema  GI: NEGATIVE for nausea, abdominal pain, heartburn, or change in bowel habits  : NEGATIVE for frequency, dysuria, or hematuria  MUSCULOSKELETAL: NEGATIVE for significant arthralgias or myalgia  NEURO: NEGATIVE for weakness, dizziness or paresthesias  ENDOCRINE: NEGATIVE for temperature intolerance, skin/hair changes  HEME: NEGATIVE for bleeding problems  PSYCHIATRIC: NEGATIVE for changes in mood or affect    Patient Active Problem List    Diagnosis Date Noted     Chronic hepatitis C without hepatic coma (H) 06/09/2021     Sacroiliac pain 06/09/2021     PVD (posterior vitreous detachment), unspecified laterality 02/20/2020     Cortical age-related cataract of both eyes 02/20/2020  "    Thyroid condition 12/03/2015     Hepatitis, C Virus      Fibromyalgia      Opioid Dependence With Continuous Use      Lumbar Disc Degeneration      No past medical history on file.  Past Surgical History:   Procedure Laterality Date     NC REMOVAL GALLBLADDER      Description: Cholecystectomy;  Recorded: 12/06/2011;     Current Outpatient Medications   Medication Sig Dispense Refill     aspirin (OSWALD ASPIRIN) 325 MG tablet Take 1 tablet by mouth.       buprenorphine HCL (BELBUCA) 150 mcg Film Apply to cheek.       cyclobenzaprine (FLEXERIL) 10 MG tablet Take 1 tablet by mouth 2 (two) times a day as needed.       erythromycin ophthalmic ointment Apply 1 strip to eye.       HYDROcodone-acetaminophen (NORCO )  mg per tablet Take 1 tablet by mouth every 8 (eight) hours.       lisinopriL (PRINIVIL,ZESTRIL) 10 MG tablet Take 1 tablet (10 mg total) by mouth daily. 90 tablet 4     neomycin-polymyxin-dexamethasone (MAXITROL) 3.5mg/mL-10,000 unit/mL-0.1 % ophthalmic suspension Apply 1 drop to eye.       BELBUCA 150 mcg Film        meclizine (ANTIVERT) 25 mg tablet Take 1 tablet (25 mg total) by mouth 3 (three) times a day as needed for dizziness or nausea. 30 tablet 0     No current facility-administered medications for this visit.        Allergies   Allergen Reactions     Other Allergy (See Comments) Unknown     Many food intolerances     Other Food Allergy      Many food intolerances       Social History     Tobacco Use     Smoking status: Never Smoker     Smokeless tobacco: Never Used   Substance Use Topics     Alcohol use: Not on file      No family history on file.  Social History     Substance and Sexual Activity   Drug Use Not on file        Objective     /80   Pulse 82   Ht 5' 9\" (1.753 m)   Wt 196 lb (88.9 kg)   SpO2 97%   BMI 28.94 kg/m    Physical Exam    GENERAL APPEARANCE: healthy, alert and no distress     EYES: EOMI,  PERRL     HENT: ear canals and TM's normal and nose and mouth " without ulcers or lesions     NECK: no adenopathy, no asymmetry, masses, or scars and thyroid normal to palpation     RESP: lungs clear to auscultation - no rales, rhonchi or wheezes     CV: regular rates and rhythm, normal S1 S2, no S3 or S4 and no murmur, click or rub     ABDOMEN:  soft, nontender, no HSM or masses and bowel sounds normal     MS: extremities normal- no gross deformities noted, no evidence of inflammation in joints, FROM in all extremities.     SKIN:suspicious lesions  On nose on check cesar look like ?? Basal cell on inner left side of bridge of the nose  and AK spot right cheeck     NEURO: Normal strength and tone, sensory exam grossly normal, mentation intact and speech normal     PSYCH: mentation appears normal. and affect normal/bright     LYMPHATICS: No cervical adenopathy    Recent Labs   Lab Test 02/26/20  1457 02/20/20  1319   HGB  --  15.9   PLT  --  261    141   K 3.9 5.3*   CREATININE 0.97 1.12        PRE-OP Diagnostics:   Labs pending at this time. Results will be reviewed when available.  No EKG required for low risk surgery (cataract, skin procedure, breast biopsy, etc).    REVISED CARDIAC RISK INDEX (RCRI)   The patient has the following serious cardiovascular risks for perioperative complications:   - No serious cardiac risks = 0 points    RCRI INTERPRETATION: 0 points: Class I (very low risk - 0.4% complication rate)         Signed Electronically by: Heidi Chairez MD    Copy of this evaluation report is provided to requesting physician.

## 2021-06-26 NOTE — PATIENT INSTRUCTIONS - HE
Preparing for Surgery    What you should do:    If you smoke, quit. Smokers may be up to twice as likely to experience complications after surgery such as pneumonia and respiratory distress.  A week of not smoking before surgery can   help you recover faster    If you have a Health Care Directive, please bring a signed copy to the hospital.    Follow the fasting instructions given to you by your surgeon    Make plans for support when you return home from the hospital (who will help you with household chores and driving while you recover?)    Your medicine plan:     Stop all vitamins, mineral and other supplements 1 week before surgery.    Take all other regular medicines with a little water the morning of your surgery or procedure     Do not take any aspirin, AdvilÆ (ibuprofen), or AleveÆ (naproxen) for 7 days before your surgery.  These can increase the risk of bleeding complications during and after your surgery    It is OK to take Tylenol  (acetaminophen) within 7 days of surgery.    Please leave your medicines at home. The hospital will give you any medicines you may need during your hospital stay.    Who should you see if the after hospital plan is not working?    Call your surgeon for any of the following:  o Your incision has any signs of separation  o Signs of infection (increasing redness, swelling, tenderness, warmth, change in appearance, or increased drainage)  o Temperature greater than 101 degrees Fahrenheit  o Drainage from your incision that is green, creamy, sticky, or if it lasts for more than 2 weeks  o Severe pain that is not relieved by medicine, rest or ice    Call your primary care clinic for any of the following:  o If you gain more than three pounds in one day or five pounds in one week  o For trouble breathing more than usual  o Have severe constipation, diarrhea or nausea  o Nausea (upset stomach) and vomiting and/or diarrhea that will not stop  o Blood in your urine or stool    Call [phone]  if you feel you are having a medical emergency    When should you be seen again?    Your surgeon will let you know when to be seen for surgical follow-up       If you stay overnight in the hospital, you may also be scheduled to see your primary care provider within 5 days of going home from the hospital to make sure medicines are not causing problems and you understand how to care for yourself.        Heidi Chairez MD 6/9/2021 1:05 PM

## 2021-06-28 NOTE — PROGRESS NOTES
Progress Notes by Heidi Chairez MD at 2/20/2020 12:40 PM     Author: Heidi Chairez MD Service: -- Author Type: Physician    Filed: 2/20/2020  3:19 PM Encounter Date: 2/20/2020 Status: Signed    : Heidi Chairez MD (Physician)       Preoperative Exam    Scheduled Procedure: Vitrectomy  Surgery Date:  03/03/2020 or 03/10/2020  Surgery Location: [facility]     Surgeon:  Dr. Veronica    Assessment/Plan:     Donovan was seen today for pre-op exam.  Patient is new to my practice had no follow-up with any physician for last 2 years    Preop examination  Patient had normal sinus rhythm on electrocardiogram.  -     Electrocardiogram Perform - Clinic  -     HM2(CBC w/o Differential)    PVD (posterior vitreous detachment), unspecified laterality    Cortical age-related cataract of both eyes    Opioid Dependence With Continuous Use  Patient is following with pain clinic specialist who prescribed narcotic for him which he is chronically dependent on it for last several years  Thyroid condition  -We will recheck the level and see if he needs the thyroid supplement as he continues to feel more tired and continued to gain weight       Thyroid Stimulating Hormone (TSH)    Chronic hepatitis C without hepatic coma (H)  -     Comprehensive Metabolic Panel    Special screening for malignant neoplasms, colon  -     Cologuard    Essential hypertension  -New diagnosis for patient patient feel his blood pressure has been elevated in last couple appointments at clinic and then again today at our clinic twice measurement willing to start the medication as with elevated blood pressure his surgery might be postponed  Patient was advised to follow-up as a nurse visit to recheck the blood pressure prior to the surgery.       lisinopriL (PRINIVIL,ZESTRIL) 10 MG tablet; Take 1 tablet (10 mg total) by mouth daily.    Lipid screening  -     Lipid Cascade RANDOM    Other orders  -Patient refused vaccination today        Pneumococcal conjugate vaccine 13-valent 6wks-17yrs; >50yrs            Patient Instructions       Patient Education     Eating Heart-Healthy Food: Using the DASH Plan    Eating for your heart doesnt have to be hard or boring. You just need to know how to make healthier choices. The DASH eating plan has been developed to help you do just that. DASH stands for Dietary Approaches to Stop Hypertension. It is a plan that has been proven to be healthier for your heart and to lower your risk for high blood pressure. It can also help lower your risk for cancer, heart disease, osteoporosis, and diabetes.  Choosing from each food group  Choose foods from each of the food groups below each day. Try to get the recommended number of servings for each food group. The serving numbers are based on a diet of 2,000 calories a day. Talk to your doctor if youre unsure about your calorie needs. Along with getting the correct servings, the DASH plan also recommends a sodium intake less than 2,300 mg per day.        Grains  Servings: 6 to 8 a day  A serving is:    1 slice bread    1 ounce dry cereal    Half a cup cooked rice, pasta or cereal  Best choices: Whole grains and any grains high in fiber. Vegetables  Servings: 4 to 5 a day  A serving is:    1 cup raw leafy vegetable    Half a cup cut-up raw or cooked vegetable    Half a cup vegetable juice  Best choices: Fresh or frozen vegetables prepared without added salt or fat.   Fruits  Servings: 4 to 5 a day  A serving is:    1 medium fruit    One-quarter cup dried fruit    Half a cup fresh, frozen, or canned fruit    Half a cup of 100% fruit juices  Best choices: A variety of fresh fruits of different colors. Whole fruits are a better choice than fruit juices. Low-fat or fat-free dairy  Servings: 2 to 3 a day  A serving is:    1 cup milk    1 cup yogurt    One and a half ounces cheese  Best choices: Skim or 1% milk, low-fat or fat-free yogurt or buttermilk, and low-fat cheeses.          Lean meats, poultry, fish  Servings: 6 or fewer a day  A serving is:    1 ounce cooked meats, poultry, or fish    1 egg  Best choices: Lean poultry and fish. Trim away visible fat. Broil, grill, roast, or boil instead of frying. Remove skin from poultry before eating. Limit how much red meat you eat.  Nuts, seeds, beans  Servings: 4 to 5 a week  A serving is:    One-third cup nuts (one and a half ounces)    2 tablespoons nut butter or seeds    Half a cup cooked dry beans or legumes  Best choices: Dry roasted nuts with no salt added, lentils, kidney beans, garbanzo beans, and whole jarrell beans.   Fats and oils  Servings: 2 to 3 a day  A serving is:    1 teaspoon vegetable oil    1 teaspoon soft margarine    1 tablespoon mayonnaise    2 tablespoons salad dressing  Best choices: Nut and vegetable oils (nontropical vegetable oils), such as olive and canola oil. Sweets  Servings: 5 a week or fewer  A serving is:    1 tablespoon sugar, maple syrup, or honey    1 tablespoon jam or jelly    1 half-ounce jelly beans (about 15)    1 cup lemonade  Best choices: Dried fruit can be a satisfying sweet. Choose low-fat sweets. And watch your serving sizes!      For more on the DASH eating plan, visit:  www.nhlbi.nih.gov/health/health-topics/topics/dash   Date Last Reviewed: 6/1/2016 2000-2019 [employer]. [address] [place] [address]. All rights reserved. This information is not intended as a substitute for professional medical care. Always follow your healthcare professional's instructions.               Surgical Procedure Risk: Low (reported cardiac risk generally < 1%)  Have you had prior anesthesia?: Yes  Have you or any family members had a previous anesthesia reaction:  No  Do you or any family members have a history of a clotting or bleeding disorder?: No  Cardiac Risk Assessment: no increased risk for major cardiac complications    Patient approved for surgery with general or local  anesthesia.    Please Note:  Patient is taking medications for Chronic Pain.    Functional Status: Independent  Patient plans to recover at home with family.     Subjective:      Donovan Yi is a 69 y.o. male who presents for a preoperative consultation.  Plan to have vitrectomy for detachment , patient all previous medical records were reviewed in the chart.  And also care everywhere currently only taking chronic pain medication and Flexeril also for pain management      Review of Systems - History obtained from chart review and the patient   General ROS: feeling tired and wt gain  Psychological ROS: negative  Ophthalmic ROS: negative  ENT ROS: negative  Allergy and Immunology ROS: negative  Hematological and Lymphatic ROS: negative  Endocrine ROS: negative  Respiratory ROS: no cough, shortness of breath, or wheezing  Cardiovascular ROS: no chest pain or dyspnea on exertion  Gastrointestinal ROS: no abdominal pain, change in bowel habits, or black or bloody stools  Genito-Urinary ROS: no dysuria, trouble voiding, or hematuria  Musculoskeletal ROS: negative  Neurological ROS: no TIA or stroke symptoms  Dermatological ROS: negative    Pertinent History    Do you have difficulty breathing or chest pain after walking up a flight of stairs: No  History of obstructive sleep apnea: No  Steroid use in the last 6 months: No  Frequent Aspirin/NSAID use: Pt takes Hydrocodone-Acetaminophen & Flexeril PRN, Nothing else   Prior Blood Transfusion: No  Prior Blood Transfusion Reaction: No  If for some reason prior to, during or after the procedure, if it is medically indicated, would you be willing to have a blood transfusion?:  There is no transfusion refusal.    Current Outpatient Medications   Medication Sig Dispense Refill   ? cyclobenzaprine (FLEXERIL) 10 MG tablet Take 1 tablet by mouth 2 (two) times a day as needed.     ? HYDROcodone-acetaminophen (NORCO )  mg per tablet Take 1 tablet by mouth every 8  (eight) hours.     ? lisinopriL (PRINIVIL,ZESTRIL) 10 MG tablet Take 1 tablet (10 mg total) by mouth daily. 30 tablet 3     No current facility-administered medications for this visit.         Allergies   Allergen Reactions   ? Other Food Allergy      Many food intolerances       Patient Active Problem List   Diagnosis   ? Hepatitis, C Virus   ? Fibromyalgia   ? Opioid Dependence With Continuous Use   ? Sacroiliitis   ? Lumbar Disc Degeneration   ? Thyroid condition   ? PVD (posterior vitreous detachment), unspecified laterality   ? Cortical age-related cataract of both eyes       No past medical history on file.    Past Surgical History:   Procedure Laterality Date   ? AR REMOVAL GALLBLADDER      Description: Cholecystectomy;  Recorded: 12/06/2011;       Social History     Socioeconomic History   ? Marital status:      Spouse name: Not on file   ? Number of children: Not on file   ? Years of education: Not on file   ? Highest education level: Not on file   Occupational History   ? Not on file   Social Needs   ? Financial resource strain: Not on file   ? Food insecurity:     Worry: Not on file     Inability: Not on file   ? Transportation needs:     Medical: Not on file     Non-medical: Not on file   Tobacco Use   ? Smoking status: Never Smoker   ? Smokeless tobacco: Never Used   Substance and Sexual Activity   ? Alcohol use: Not on file   ? Drug use: Not on file   ? Sexual activity: Not on file   Lifestyle   ? Physical activity:     Days per week: Not on file     Minutes per session: Not on file   ? Stress: Not on file   Relationships   ? Social connections:     Talks on phone: Not on file     Gets together: Not on file     Attends Holiness service: Not on file     Active member of club or organization: Not on file     Attends meetings of clubs or organizations: Not on file     Relationship status: Not on file   ? Intimate partner violence:     Fear of current or ex partner: Not on file     Emotionally  "abused: Not on file     Physically abused: Not on file     Forced sexual activity: Not on file   Other Topics Concern   ? Not on file   Social History Narrative   ? Not on file       Patient Care Team:  Heidi Chairez MD as PCP - General (Family Medicine)  Bryce David MD as Assigned PCP          Objective:     Vitals:    02/20/20 1236 02/20/20 1306   BP: 152/82 160/90   Pulse: 80    Weight: 204 lb 9.6 oz (92.8 kg)    Height: 5' 8.5\" (1.74 m)          Physical Exam:    General: Alert, no acute distress.   HEENT: normocephalic conjunctivae are clear, Normal pearly TMs bilaterally without erythema, pus or fluid.   Nose is clear.  Oropharynx is moist and clear,   Neck: supple without adenopathy or thyromegaly.  Lungs: Good aeration bilaterally. No prolongation of expiratory phase.   No tachypnea, retractions, or increased work of breathing. Clear to auscultation without wheezes, rales or rhonci.    Heart: regular rate and rhythm, normal S1 and S2, no murmurs  Abdomen: soft and nontender, bowel sounds are present, no hepatosplenomegaly or mass palpable.  Skin: clear without rash or lesions  Neuro: alert, interactive moving all extremities equally, normal muscle tone in all 4 extremities,     EKG:  Per My personal read  normal sinus rhythm     Labs:  Recent Results (from the past 240 hour(s))   Electrocardiogram Perform - Clinic    Collection Time: 02/20/20 12:52 PM   Result Value Ref Range    SYSTOLIC BLOOD PRESSURE      DIASTOLIC BLOOD PRESSURE      VENTRICULAR RATE 74 BPM    ATRIAL RATE 74 BPM    P-R INTERVAL 130 ms    QRS DURATION 90 ms    Q-T INTERVAL 366 ms    QTC CALCULATION (BEZET) 406 ms    P Axis 20 degrees    R AXIS 29 degrees    T AXIS 27 degrees    MUSE DIAGNOSIS       Normal sinus rhythm  Normal ECG  No previous ECGs available  Confirmed by TIFFANIE BAI, MELVI LOC:JN ([id number]) on 2/20/2020 3:08:15 PM     HM2(CBC w/o Differential)    Collection Time: 02/20/20  1:19 PM   Result Value Ref Range    WBC 5.1 4.0 " - 11.0 thou/uL    RBC 5.21 4.40 - 6.20 mill/uL    Hemoglobin 15.9 14.0 - 18.0 g/dL    Hematocrit 47.2 40.0 - 54.0 %    MCV 91 80 - 100 fL    MCH 30.5 27.0 - 34.0 pg    MCHC 33.6 32.0 - 36.0 g/dL    RDW 11.9 11.0 - 14.5 %    Platelets 261 140 - 440 thou/uL    MPV 7.0 7.0 - 10.0 fL       Immunization History   Administered Date(s) Administered   ? Influenza,seasonal, Inj IIV3 11/11/2002           Electronically signed by Heidi Chairez MD 02/20/20 12:30 PM

## 2021-07-06 VITALS
OXYGEN SATURATION: 97 % | WEIGHT: 196 LBS | HEART RATE: 82 BPM | SYSTOLIC BLOOD PRESSURE: 126 MMHG | HEIGHT: 69 IN | BODY MASS INDEX: 29.03 KG/M2 | DIASTOLIC BLOOD PRESSURE: 80 MMHG

## 2021-07-26 ENCOUNTER — TRANSFERRED RECORDS (OUTPATIENT)
Dept: HEALTH INFORMATION MANAGEMENT | Facility: CLINIC | Age: 71
End: 2021-07-26

## 2021-07-26 LAB — PHQ9 SCORE: 2

## 2021-08-25 ENCOUNTER — TRANSFERRED RECORDS (OUTPATIENT)
Dept: HEALTH INFORMATION MANAGEMENT | Facility: CLINIC | Age: 71
End: 2021-08-25

## 2021-09-04 ENCOUNTER — HEALTH MAINTENANCE LETTER (OUTPATIENT)
Age: 71
End: 2021-09-04

## 2021-09-22 ENCOUNTER — TRANSFERRED RECORDS (OUTPATIENT)
Dept: HEALTH INFORMATION MANAGEMENT | Facility: CLINIC | Age: 71
End: 2021-09-22

## 2021-10-18 ENCOUNTER — NURSE TRIAGE (OUTPATIENT)
Dept: NURSING | Facility: CLINIC | Age: 71
End: 2021-10-18

## 2021-10-18 ENCOUNTER — OFFICE VISIT (OUTPATIENT)
Dept: FAMILY MEDICINE | Facility: CLINIC | Age: 71
End: 2021-10-18
Payer: MEDICARE

## 2021-10-18 VITALS
OXYGEN SATURATION: 98 % | DIASTOLIC BLOOD PRESSURE: 92 MMHG | SYSTOLIC BLOOD PRESSURE: 180 MMHG | WEIGHT: 185 LBS | BODY MASS INDEX: 27.32 KG/M2 | HEART RATE: 79 BPM

## 2021-10-18 DIAGNOSIS — R53.83 OTHER FATIGUE: ICD-10-CM

## 2021-10-18 DIAGNOSIS — G47.00 INSOMNIA, UNSPECIFIED TYPE: ICD-10-CM

## 2021-10-18 DIAGNOSIS — E07.9 DISORDER OF THYROID: ICD-10-CM

## 2021-10-18 DIAGNOSIS — I10 ESSENTIAL HYPERTENSION: Primary | ICD-10-CM

## 2021-10-18 DIAGNOSIS — E07.9 THYROID CONDITION: ICD-10-CM

## 2021-10-18 DIAGNOSIS — Z12.5 SCREENING FOR PROSTATE CANCER: ICD-10-CM

## 2021-10-18 DIAGNOSIS — R35.1 NOCTURIA: ICD-10-CM

## 2021-10-18 LAB
ANION GAP SERPL CALCULATED.3IONS-SCNC: 12 MMOL/L (ref 5–18)
ATRIAL RATE - MUSE: 78 BPM
BUN SERPL-MCNC: 15 MG/DL (ref 8–28)
CALCIUM SERPL-MCNC: 9.9 MG/DL (ref 8.5–10.5)
CHLORIDE BLD-SCNC: 101 MMOL/L (ref 98–107)
CO2 SERPL-SCNC: 29 MMOL/L (ref 22–31)
CREAT SERPL-MCNC: 0.78 MG/DL (ref 0.7–1.3)
DIASTOLIC BLOOD PRESSURE - MUSE: NORMAL MMHG
ERYTHROCYTE [DISTWIDTH] IN BLOOD BY AUTOMATED COUNT: 13.6 % (ref 10–15)
GFR SERPL CREATININE-BSD FRML MDRD: 90 ML/MIN/1.73M2
GLUCOSE BLD-MCNC: 103 MG/DL (ref 70–125)
HCT VFR BLD AUTO: 43.4 % (ref 40–53)
HGB BLD-MCNC: 14.4 G/DL (ref 13.3–17.7)
INTERPRETATION ECG - MUSE: NORMAL
MCH RBC QN AUTO: 29.7 PG (ref 26.5–33)
MCHC RBC AUTO-ENTMCNC: 33.2 G/DL (ref 31.5–36.5)
MCV RBC AUTO: 90 FL (ref 78–100)
P AXIS - MUSE: 31 DEGREES
PLATELET # BLD AUTO: 247 10E3/UL (ref 150–450)
POTASSIUM BLD-SCNC: 3.9 MMOL/L (ref 3.5–5)
PR INTERVAL - MUSE: 120 MS
PSA SERPL-MCNC: 0.37 UG/L (ref 0–6.5)
QRS DURATION - MUSE: 88 MS
QT - MUSE: 362 MS
QTC - MUSE: 412 MS
R AXIS - MUSE: 37 DEGREES
RBC # BLD AUTO: 4.85 10E6/UL (ref 4.4–5.9)
SODIUM SERPL-SCNC: 142 MMOL/L (ref 136–145)
SYSTOLIC BLOOD PRESSURE - MUSE: NORMAL MMHG
T AXIS - MUSE: 11 DEGREES
TSH SERPL DL<=0.005 MIU/L-ACNC: 1.56 UIU/ML (ref 0.3–5)
VENTRICULAR RATE- MUSE: 78 BPM
WBC # BLD AUTO: 7.1 10E3/UL (ref 4–11)

## 2021-10-18 PROCEDURE — 36415 COLL VENOUS BLD VENIPUNCTURE: CPT | Performed by: FAMILY MEDICINE

## 2021-10-18 PROCEDURE — 84443 ASSAY THYROID STIM HORMONE: CPT | Performed by: FAMILY MEDICINE

## 2021-10-18 PROCEDURE — 93005 ELECTROCARDIOGRAM TRACING: CPT | Performed by: FAMILY MEDICINE

## 2021-10-18 PROCEDURE — 99214 OFFICE O/P EST MOD 30 MIN: CPT | Performed by: FAMILY MEDICINE

## 2021-10-18 PROCEDURE — 93010 ELECTROCARDIOGRAM REPORT: CPT | Mod: OFF | Performed by: INTERNAL MEDICINE

## 2021-10-18 PROCEDURE — 85027 COMPLETE CBC AUTOMATED: CPT | Performed by: FAMILY MEDICINE

## 2021-10-18 PROCEDURE — G0103 PSA SCREENING: HCPCS | Performed by: FAMILY MEDICINE

## 2021-10-18 PROCEDURE — 80048 BASIC METABOLIC PNL TOTAL CA: CPT | Performed by: FAMILY MEDICINE

## 2021-10-18 RX ORDER — LISINOPRIL AND HYDROCHLOROTHIAZIDE 12.5; 2 MG/1; MG/1
1 TABLET ORAL DAILY
Qty: 90 TABLET | Refills: 1 | Status: SHIPPED | OUTPATIENT
Start: 2021-10-18 | End: 2022-05-05

## 2021-10-18 RX ORDER — TAMSULOSIN HYDROCHLORIDE 0.4 MG/1
0.4 CAPSULE ORAL DAILY
Qty: 90 CAPSULE | Refills: 0 | Status: SHIPPED | OUTPATIENT
Start: 2021-10-18 | End: 2022-01-21

## 2021-10-18 RX ORDER — LISINOPRIL 10 MG/1
TABLET ORAL
COMMUNITY
Start: 2021-08-26 | End: 2021-11-16

## 2021-10-18 ASSESSMENT — ENCOUNTER SYMPTOMS
CHILLS: 0
SHORTNESS OF BREATH: 0
MUSCULOSKELETAL NEGATIVE: 1
FATIGUE: 0
DIZZINESS: 1
HEADACHES: 1
PALPITATIONS: 0
GASTROINTESTINAL NEGATIVE: 1

## 2021-10-18 NOTE — PROGRESS NOTES
"    Assessment & Plan     Essential hypertension  - lisinopril-hydrochlorothiazide (ZESTORETIC) 20-12.5 MG tablet  Dispense: 90 tablet; Refill: 1  - Basic metabolic panel  (Ca, Cl, CO2, Creat, Gluc, K, Na, BUN)  - EKG 12-lead, tracing only    Nocturia  - tamsulosin (FLOMAX) 0.4 MG capsule  Dispense: 90 capsule; Refill: 0    Insomnia, unspecified type    Disorder of thyroid    Thyroid condition    Other fatigue  - CBC with platelets  - TSH with free T4 reflex    Screening for prostate cancer  - PSA, screen  He does have a 2 major concerns today.  His blood pressure is high and I did change his medication from lisinopril 10 mg daily to lisinopril with hydrochlorothiazide.  Dose of the lisinopril was increased to 20 mg.  Encouraged him to take his medications in the morning and let us know if he is not having any lightheadedness.  Because of the nocturia I explained to him that this could be as a result of prostate enlargement.  I will get his PSA, tamsulosin to his medication.  Discussed with him the effect of tamsulosin the possibility of lightheadedness.  With concerns of fatigue and tiredness and he had ordered for labs.  I think that has a lot to do with sleep issue and he can get over-the-counter sleep medications, and I think that when his blood pressure is better controlled he should probably able to sleep better.  If that is not helpful we will consider other medication.  In the meantime I will see him back in about 4 weeks for a recheck of the blood pressure.     BMI:   Estimated body mass index is 27.32 kg/m  as calculated from the following:    Height as of 6/9/21: 1.753 m (5' 9\").    Weight as of this encounter: 83.9 kg (185 lb).           No follow-ups on file.    Barbara Anna MD  [facility]   Donovan is a 71 year old who presents for the following health issues.    HPI   He comes in today because he has been having a lot of headaches as well as " having difficulty controlling his blood pressure.  He noted that he has been checking his blood pressures at home and having numbers of about 180 systolic and sometimes 90s and above diastolic.  He noted that he has not been sleeping very well because of the headache.  He has to also sleep and wake up multiple times at night because of needing to urinate.  He apparently has a nocturia that appears to be from the prostate.  He has had no chest pain and no shortness of breath.  But he noted that he has fatigue that makes it difficult for him to continue walking.  He noted that he will walk and feels so tired that he has to rest.  He denies any swelling to lower extremities.  Noted any weaknesses to the face or neck pain or limbs.  Noted a lot of worry because of the blood pressure.      No family history on file.   Social History     Socioeconomic History     Marital status:      Spouse name: Not on file     Number of children: Not on file     Years of education: Not on file     Highest education level: Not on file   Occupational History     Not on file   Tobacco Use     Smoking status: Never Smoker     Smokeless tobacco: Never Used   Substance and Sexual Activity     Alcohol use: Not on file     Drug use: Not on file     Sexual activity: Not on file   Other Topics Concern     Not on file   Social History Narrative     Not on file     Social Determinants of Health     Financial Resource Strain:      Difficulty of Paying Living Expenses:    Food Insecurity:      Worried About Running Out of Food in the Last Year:      Ran Out of Food in the Last Year:    Transportation Needs:      Lack of Transportation (Medical):      Lack of Transportation (Non-Medical):    Physical Activity:      Days of Exercise per Week:      Minutes of Exercise per Session:    Stress:      Feeling of Stress :    Social Connections:      Frequency of Communication with Friends and Family:      Frequency of Social Gatherings with Friends and  Family:      Attends Protestant Services:      Active Member of Clubs or Organizations:      Attends Club or Organization Meetings:      Marital Status:    Intimate Partner Violence:      Fear of Current or Ex-Partner:      Emotionally Abused:      Physically Abused:      Sexually Abused:       Past Surgical History:   Procedure Laterality Date     HC REMOVAL GALLBLADDER      Description: Cholecystectomy;  Recorded: 12/06/2011;      No past medical history on file.     Review of Systems   Constitutional: Negative for chills and fatigue.   HENT: Negative.    Respiratory: Negative for shortness of breath.    Cardiovascular: Negative for chest pain and palpitations.   Gastrointestinal: Negative.    Musculoskeletal: Negative.    Neurological: Positive for dizziness (Intermittently.) and headaches.            Objective    BP (!) 180/92   Pulse 79   Wt 83.9 kg (185 lb)   SpO2 98%   BMI 27.32 kg/m    Body mass index is 27.32 kg/m .  Physical Exam  Constitutional:       General: He is not in acute distress.     Appearance: Normal appearance.   Eyes:      Extraocular Movements: Extraocular movements intact.      Pupils: Pupils are equal, round, and reactive to light.   Cardiovascular:      Rate and Rhythm: Normal rate and regular rhythm.      Pulses: Normal pulses.      Heart sounds: Normal heart sounds.   Pulmonary:      Effort: Pulmonary effort is normal.      Breath sounds: Normal breath sounds.   Musculoskeletal:         General: Normal range of motion.      Cervical back: Normal range of motion.   Neurological:      Mental Status: He is alert.

## 2021-10-18 NOTE — TELEPHONE ENCOUNTER
Patient states that high blood pressure started on October 14th and is having constant headaches and blood pressure has been 180/80 and today 180/90.  Patient takes blood pressure medication at night.  Saturday was 160/75 and did not last.  Patient is not getting much sleep and is worn out and tired of his headache.  Patient is requesting an in person clinic visit.  FNA advised that if no appointment available to go to Walk In Clinic and patient agrees.    COVID 19 Nurse Triage Plan/Patient Instructions    Please be aware that novel coronavirus (COVID-19) may be circulating in the community. If you develop symptoms such as fever, cough, or SOB or if you have concerns about the presence of another infection including coronavirus (COVID-19), please contact your health care provider or visit [email or web address].     Disposition/Instructions    In-Person Visit with provider recommended. Reference Visit Selection Guide.    Thank you for taking steps to prevent the spread of this virus.  o Limit your contact with others.  o Wear a simple mask to cover your cough.  o Wash your hands well and often.    Resources    [facility]: About COVID-19: [email or web address]    CDC: What to Do If You're Sick: www.cdc.gov/coronavirus/2019-ncov/about/steps-when-sick.html    CDC: Ending Home Isolation: www.cdc.gov/coronavirus/2019-ncov/hcp/disposition-in-home-patients.html     CDC: Caring for Someone: www.cdc.gov/coronavirus/2019-ncov/if-you-are-sick/care-for-someone.html     [facility]: Interim Guidance for Hospital Discharge to Home: www.health[facility]/diseases/coronavirus/hcp/hospdischarge.pdf    [facility] clinical trials (COVID-19 research studies): [email or web address].[email or web address]-clinical-trials     Below are the COVID-19 hotlines at the [place] Department of [facility] ([facility]). Interpreters are available.   o For health questions: Call 402-668-8838 or 1-668.747.3377 (7 a.m. to 7 p.m.)  o For questions  about schools and childcare: Call 598-202-9684 or 1-790.191.5687 (7 a.m. to 7 p.m.)                       Reason for Disposition    Systolic BP >= 180 OR Diastolic >= 110    Additional Information    Negative: Sounds like a life-threatening emergency to the triager    Negative: Pregnant > 20 weeks or postpartum (< 6 weeks after delivery) and new hand or face swelling    Negative: Pregnant > 20 weeks and BP > 140/90    Negative: Systolic BP >= 160 OR Diastolic >= 100, and any cardiac or neurologic symptoms (e.g., chest pain, difficulty breathing, unsteady gait, blurred vision)    Negative: Patient sounds very sick or weak to the triager    Negative: BP Systolic BP >= 140 OR Diastolic >= 90 and postpartum (from 0 to 6 weeks after delivery)    Negative: Systolic BP >= 180 OR Diastolic >= 110, and missed most recent dose of blood pressure medication    Protocols used: HIGH BLOOD PRESSURE-A-OH

## 2021-10-25 ENCOUNTER — TRANSFERRED RECORDS (OUTPATIENT)
Dept: HEALTH INFORMATION MANAGEMENT | Facility: CLINIC | Age: 71
End: 2021-10-25
Payer: MEDICARE

## 2021-11-16 ENCOUNTER — OFFICE VISIT (OUTPATIENT)
Dept: FAMILY MEDICINE | Facility: CLINIC | Age: 71
End: 2021-11-16
Payer: MEDICARE

## 2021-11-16 VITALS
SYSTOLIC BLOOD PRESSURE: 130 MMHG | DIASTOLIC BLOOD PRESSURE: 66 MMHG | HEART RATE: 76 BPM | BODY MASS INDEX: 27.73 KG/M2 | WEIGHT: 187.8 LBS

## 2021-11-16 DIAGNOSIS — R35.1 NOCTURIA: ICD-10-CM

## 2021-11-16 DIAGNOSIS — I10 ESSENTIAL HYPERTENSION: Primary | ICD-10-CM

## 2021-11-16 PROCEDURE — 99213 OFFICE O/P EST LOW 20 MIN: CPT | Performed by: FAMILY MEDICINE

## 2021-11-16 NOTE — PROGRESS NOTES
"  Assessment & Plan     Essential hypertension    Nocturia    His blood pressure at this time he is in normal.  He has noted no major side effects to the medication at this time but noted elevated of lightheadedness at the beginning.  He also noted that the nocturia is a lot better.  Instead of waking up at night up to 5 times he wakes up about 2 times at this time.  Because of that I will continue with the medications as it is.  He does not want to be seen by the urologist at this time for nocturia since he noted that he is happy as things are now.  See him back in about 3 months for recheck of blood pressure at that point check his kidney function.  Discussed the possibility of low potassium with him.  And he was encouraged to eat food that is rich in potassium.  Discussed health maintenance.  Discussed the colonoscopy and he is adamant that he will not do colonoscopy because of his previous experiences that has to do with abdominal problems that he had in the past.     BMI:   Estimated body mass index is 27.73 kg/m  as calculated from the following:    Height as of 6/9/21: 1.753 m (5' 9\").    Weight as of this encounter: 85.2 kg (187 lb 12.8 oz).           Return in about 3 months (around 2/16/2022).    Barbara Anna MD  [facility]   Donovan is a 71 year old who presents for the following health issues   HPI:  He comes in today for follow-up.  Last time he was seen about a month ago he was having increasing blood pressure problems.  He was on lisinopril 10 mg daily.  Have him started on lisinopril hydrochlorothiazide 20 mg and 12.5 mg.  He noted that he is doing well at this time.  Feeling okay we does not think that having to urinate is an issue.  He was also having some nocturia and tamsulosin was started.  He is now having less nocturia.  He is feeling better with it.  Noted that he sleeps better at this time.    Review of Systems   CONSTITUTIONAL: " NEGATIVE for fever, chills, change in weight  ENT/MOUTH: NEGATIVE for ear, mouth and throat problems  RESP: NEGATIVE for significant cough or SOB  CV: NEGATIVE for chest pain, palpitations or peripheral edema  : negative for dysuria, hematuria, decreased urinary stream, erectile dysfunction      Objective    /66 (BP Location: Left arm, Patient Position: Sitting, Cuff Size: Adult Large)   Pulse 76   Wt 85.2 kg (187 lb 12.8 oz)   BMI 27.73 kg/m    Body mass index is 27.73 kg/m .  Physical Exam   GENERAL: healthy, alert and no distress  NECK: Neck is supple  RESP: He has an normal quiet unlabored breathing.  CV: no peripheral edema  ABDOMEN: soft, nontender, no hepatosplenomegaly, no masses and bowel sounds normal  MS: no gross musculoskeletal defects noted, no edema

## 2021-11-16 NOTE — PROGRESS NOTES
{PROVIDER CHARTING PREFERENCE:627098}    Mercedes Man is a 71 year old who presents for the following health issues {ACCOMPANIED BY STATEMENT (Optional):899123}    HPI     {SUPERLIST (Optional):931070}  {additonal problems for provider to add (Optional):675558}    Review of Systems   {ROS COMP (Optional):664147}      Objective    /66 (BP Location: Left arm, Patient Position: Sitting, Cuff Size: Adult Large)   Pulse 76   Wt 85.2 kg (187 lb 12.8 oz)   BMI 27.73 kg/m    Body mass index is 27.73 kg/m .  Physical Exam   {Exam List (Optional):768274}    {Diagnostic Test Results (Optional):634200}    {AMBULATORY ATTESTATION (Optional):754576}

## 2021-12-21 ENCOUNTER — TRANSFERRED RECORDS (OUTPATIENT)
Dept: HEALTH INFORMATION MANAGEMENT | Facility: CLINIC | Age: 71
End: 2021-12-21
Payer: MEDICARE

## 2022-01-18 DIAGNOSIS — R35.1 NOCTURIA: ICD-10-CM

## 2022-01-19 ENCOUNTER — TELEPHONE (OUTPATIENT)
Dept: ORTHOPEDICS | Facility: CLINIC | Age: 72
End: 2022-01-19
Payer: MEDICARE

## 2022-01-19 NOTE — TELEPHONE ENCOUNTER
received Fax from Physical Therapist asking for New PT orders.  Pt has not been seen by  since 10- for DDD Lumbar & Flatback syndrome.    Paris Rajani said she will reply to PT.  I left pt VM that we cant place New PT order until he is reevaluated so he should call Ortho clinic [phone] option 2 & make appt. With either Spine PAs Madelyn Alcaraz or Loretta Oviedo & I explained why.  Call back prn. Jenny Lyle RN.     1-24-22:; Postal mailed copy of this note to pt.  Jenny Lyle RN.

## 2022-01-20 ENCOUNTER — MYC REFILL (OUTPATIENT)
Dept: FAMILY MEDICINE | Facility: CLINIC | Age: 72
End: 2022-01-20
Payer: MEDICARE

## 2022-01-20 DIAGNOSIS — R35.1 NOCTURIA: ICD-10-CM

## 2022-01-21 RX ORDER — TAMSULOSIN HYDROCHLORIDE 0.4 MG/1
CAPSULE ORAL
Qty: 90 CAPSULE | Refills: 3 | Status: SHIPPED | OUTPATIENT
Start: 2022-01-21 | End: 2023-03-12

## 2022-01-21 NOTE — TELEPHONE ENCOUNTER
"Last Written Prescription Date:  10/18/21  Last Fill Quantity: 90,  # refills: 0   Last office visit provider:  11/16/21     Requested Prescriptions   Pending Prescriptions Disp Refills     tamsulosin (FLOMAX) 0.4 MG capsule [Pharmacy Med Name: TAMSULOSIN 0.4MG CAPSULES] 90 capsule 0     Sig: TAKE 1 CAPSULE(0.4 MG) BY MOUTH DAILY       Alpha Blockers Passed - 1/18/2022  7:13 PM        Passed - Blood pressure under 140/90 in past 12 months     BP Readings from Last 3 Encounters:   11/16/21 130/66   10/18/21 (!) 180/92   06/09/21 126/80                 Passed - Recent (12 mo) or future (30 days) visit within the authorizing provider's specialty     Patient has had an office visit with the authorizing provider or a provider within the authorizing providers department within the previous 12 mos or has a future within next 30 days. See \"Patient Info\" tab in inbasket, or \"Choose Columns\" in Meds & Orders section of the refill encounter.              Passed - Patient does not have Tadalafil, Vardenafil, or Sildenafil on their medication list        Passed - Medication is active on med list        Passed - Patient is 18 years of age or older             Jarocho Block RN 01/21/22 7:29 AM  "

## 2022-01-23 RX ORDER — TAMSULOSIN HYDROCHLORIDE 0.4 MG/1
0.4 CAPSULE ORAL DAILY
Qty: 90 CAPSULE | Refills: 0 | OUTPATIENT
Start: 2022-01-23

## 2022-01-23 NOTE — TELEPHONE ENCOUNTER
Duplicate refill request.  Tamsulosin filled 1/21/2022 #90 tablets with 3 refills.    tamsulosin (FLOMAX) 0.4 MG capsule 90 capsule 3 1/21/2022  No   Sig: TAKE 1 CAPSULE(0.4 MG) BY MOUTH DAILY   Sent to pharmacy as: Tamsulosin HCl 0.4 MG Oral Capsule   Class: E-Prescribe   Order: [id number]   E-Prescribing Status: Receipt confirmed by pharmacy (1/21/2022  7:30 AM CST     Mirtha Pradhan, RN  Triage Nurse Advisor

## 2022-02-19 ENCOUNTER — HEALTH MAINTENANCE LETTER (OUTPATIENT)
Age: 72
End: 2022-02-19

## 2022-02-21 ENCOUNTER — TRANSFERRED RECORDS (OUTPATIENT)
Dept: HEALTH INFORMATION MANAGEMENT | Facility: CLINIC | Age: 72
End: 2022-02-21
Payer: MEDICARE

## 2022-03-22 ENCOUNTER — TRANSFERRED RECORDS (OUTPATIENT)
Dept: HEALTH INFORMATION MANAGEMENT | Facility: CLINIC | Age: 72
End: 2022-03-22
Payer: MEDICARE

## 2022-04-22 ENCOUNTER — TRANSFERRED RECORDS (OUTPATIENT)
Dept: HEALTH INFORMATION MANAGEMENT | Facility: CLINIC | Age: 72
End: 2022-04-22
Payer: MEDICARE

## 2022-05-02 DIAGNOSIS — I10 ESSENTIAL HYPERTENSION: ICD-10-CM

## 2022-05-02 RX ORDER — LISINOPRIL AND HYDROCHLOROTHIAZIDE 12.5; 2 MG/1; MG/1
1 TABLET ORAL DAILY
Qty: 90 TABLET | Refills: 1 | Status: CANCELLED | OUTPATIENT
Start: 2022-05-02

## 2022-05-05 RX ORDER — LISINOPRIL AND HYDROCHLOROTHIAZIDE 12.5; 2 MG/1; MG/1
1 TABLET ORAL DAILY
Qty: 90 TABLET | Refills: 1 | Status: SHIPPED | OUTPATIENT
Start: 2022-05-05 | End: 2022-12-09

## 2022-05-05 NOTE — TELEPHONE ENCOUNTER
"Routing refill request to provider for review/approval because:  A break in medication  Blood pressure    Last Written Prescription Date:  10/18/2021  Last Fill Quantity: 90,  # refills: 1   Last office visit provider:  11/16/2021     Requested Prescriptions   Pending Prescriptions Disp Refills     lisinopril-hydrochlorothiazide (ZESTORETIC) 20-12.5 MG tablet [Pharmacy Med Name: LISINOPRIL-HCTZ 20/12.5MG TABLETS] 90 tablet 1     Sig: TAKE 1 TABLET BY MOUTH DAILY       Diuretics (Including Combos) Protocol Passed - 5/5/2022  1:58 PM        Passed - Blood pressure under 140/90 in past 12 months     BP Readings from Last 3 Encounters:   11/16/21 130/66   10/18/21 (!) 180/92   06/09/21 126/80                 Passed - Recent (12 mo) or future (30 days) visit within the authorizing provider's specialty     Patient has had an office visit with the authorizing provider or a provider within the authorizing providers department within the previous 12 mos or has a future within next 30 days. See \"Patient Info\" tab in inbasket, or \"Choose Columns\" in Meds & Orders section of the refill encounter.              Passed - Medication is active on med list        Passed - Patient is age 18 or older        Passed - Normal serum creatinine on file in past 12 months     Recent Labs   Lab Test 10/18/21  0938   CR 0.78              Passed - Normal serum potassium on file in past 12 months     Recent Labs   Lab Test 10/18/21  0938   POTASSIUM 3.9                    Passed - Normal serum sodium on file in past 12 months     Recent Labs   Lab Test 10/18/21  0938                ACE Inhibitors (Including Combos) Protocol Passed - 5/5/2022  1:58 PM        Passed - Blood pressure under 140/90 in past 12 months     BP Readings from Last 3 Encounters:   11/16/21 130/66   10/18/21 (!) 180/92   06/09/21 126/80                 Passed - Recent (12 mo) or future (30 days) visit within the authorizing provider's specialty     Patient has had an " "office visit with the authorizing provider or a provider within the authorizing providers department within the previous 12 mos or has a future within next 30 days. See \"Patient Info\" tab in inbasket, or \"Choose Columns\" in Meds & Orders section of the refill encounter.              Passed - Medication is active on med list        Passed - Patient is age 18 or older        Passed - Normal serum creatinine on file in past 12 months     Recent Labs   Lab Test 10/18/21  0938   CR 0.78       Ok to refill medication if creatinine is low          Passed - Normal serum potassium on file in past 12 months     Recent Labs   Lab Test 10/18/21  0938   POTASSIUM 3.9                  Margoth Szymanski RN 05/05/22 1:59 PM  "

## 2022-05-19 ENCOUNTER — TRANSFERRED RECORDS (OUTPATIENT)
Dept: HEALTH INFORMATION MANAGEMENT | Facility: CLINIC | Age: 72
End: 2022-05-19
Payer: MEDICARE

## 2022-07-19 ENCOUNTER — TRANSFERRED RECORDS (OUTPATIENT)
Dept: HEALTH INFORMATION MANAGEMENT | Facility: CLINIC | Age: 72
End: 2022-07-19

## 2022-08-16 ENCOUNTER — TRANSFERRED RECORDS (OUTPATIENT)
Dept: HEALTH INFORMATION MANAGEMENT | Facility: CLINIC | Age: 72
End: 2022-08-16

## 2022-08-16 LAB — PHQ9 SCORE: 2

## 2022-09-20 ENCOUNTER — TRANSFERRED RECORDS (OUTPATIENT)
Dept: HEALTH INFORMATION MANAGEMENT | Facility: CLINIC | Age: 72
End: 2022-09-20

## 2022-10-16 ENCOUNTER — HEALTH MAINTENANCE LETTER (OUTPATIENT)
Age: 72
End: 2022-10-16

## 2022-10-18 ENCOUNTER — TRANSFERRED RECORDS (OUTPATIENT)
Dept: HEALTH INFORMATION MANAGEMENT | Facility: CLINIC | Age: 72
End: 2022-10-18

## 2022-12-09 ENCOUNTER — OFFICE VISIT (OUTPATIENT)
Dept: FAMILY MEDICINE | Facility: CLINIC | Age: 72
End: 2022-12-09
Payer: MEDICARE

## 2022-12-09 VITALS
BODY MASS INDEX: 27.01 KG/M2 | DIASTOLIC BLOOD PRESSURE: 62 MMHG | HEART RATE: 74 BPM | WEIGHT: 182.9 LBS | SYSTOLIC BLOOD PRESSURE: 112 MMHG

## 2022-12-09 DIAGNOSIS — F11.90 CHRONIC, CONTINUOUS USE OF OPIOIDS: ICD-10-CM

## 2022-12-09 DIAGNOSIS — I10 ESSENTIAL HYPERTENSION: Primary | ICD-10-CM

## 2022-12-09 DIAGNOSIS — Z13.6 SCREENING FOR AAA (ABDOMINAL AORTIC ANEURYSM): ICD-10-CM

## 2022-12-09 DIAGNOSIS — Z12.11 SCREEN FOR COLON CANCER: ICD-10-CM

## 2022-12-09 DIAGNOSIS — M51.379 DEGENERATION OF LUMBAR OR LUMBOSACRAL INTERVERTEBRAL DISC: ICD-10-CM

## 2022-12-09 DIAGNOSIS — M53.3 SACROILIAC PAIN: ICD-10-CM

## 2022-12-09 PROBLEM — F11.20 OPIOID TYPE DEPENDENCE, CONTINUOUS (H): Status: ACTIVE | Noted: 2021-11-30

## 2022-12-09 PROBLEM — B18.2 CHRONIC HEPATITIS C WITHOUT HEPATIC COMA (H): Status: RESOLVED | Noted: 2021-06-09 | Resolved: 2022-12-09

## 2022-12-09 PROBLEM — M79.7 FIBROMYALGIA: Status: ACTIVE | Noted: 2021-11-30

## 2022-12-09 PROCEDURE — 99214 OFFICE O/P EST MOD 30 MIN: CPT | Performed by: FAMILY MEDICINE

## 2022-12-09 RX ORDER — BUPRENORPHINE HYDROCHLORIDE 150 UG/1
FILM, SOLUBLE BUCCAL
COMMUNITY
Start: 2022-11-21 | End: 2024-02-14

## 2022-12-09 RX ORDER — MECLIZINE HYDROCHLORIDE 25 MG/1
25 TABLET ORAL
COMMUNITY
Start: 2020-02-26 | End: 2022-12-09

## 2022-12-09 RX ORDER — LISINOPRIL 10 MG/1
TABLET ORAL
COMMUNITY
Start: 2020-11-02 | End: 2022-12-09

## 2022-12-09 RX ORDER — ASPIRIN 325 MG
1 TABLET ORAL
COMMUNITY

## 2022-12-09 RX ORDER — LISINOPRIL 10 MG/1
10 TABLET ORAL DAILY
Qty: 90 TABLET | Refills: 1 | Status: SHIPPED | OUTPATIENT
Start: 2022-12-09 | End: 2023-05-02

## 2022-12-09 RX ORDER — CYCLOBENZAPRINE HCL 10 MG
TABLET ORAL
COMMUNITY
Start: 2022-10-22 | End: 2024-02-14

## 2022-12-09 NOTE — PROGRESS NOTES
Assessment & Plan     Patient presents with:  Hypertension       Donovan was seen today for hypertension.    Diagnoses and all orders for this visit:    Essential hypertension  Comments:  As BP running low we will switch his med to lisinopril 10 mg daily and will d/c lisinopril 20+12.5 for now , f/u for medicare visit in 2023  Orders:  -     lisinopril (ZESTRIL) 10 MG tablet; Take 1 tablet (10 mg) by mouth daily    Chronic, continuous use of opioids  Comments:  followed at [facility] pain clinic     Lumbar Disc Degeneration    Sacroiliac pain    Screen for colon cancer  -     Cancel: COLOGUARD([employer]); Future    Screening for AAA (abdominal aortic aneurysm)  -     Abdominal Aortic Aneurysm Screening/Tracking  -     US Aorta Medicare AAA Screening; Future    Other orders  -     REVIEW OF HEALTH MAINTENANCE PROTOCOL ORDERS  -     ZOSTER VACCINE RECOMBINANT ADJUVANTED (SHINGRIX); Future  -     Pneumococcal 20 Valent Conjugate (PCV20); Future  -     INFLUENZA, QUAD, HIGH DOSE, PF, 65YR + (FLUZONE HD); Future       Updated some health maintenance requirement for the patient today he willing to do AAA screening         No follow-ups on file.      Subjective   Donovan Yi 72 year old who presents for the following health issues     HPI     HTN: Patient concerned that recently his blood pressures running low and they were ranging between 90/60 that time he was taking lisinopril 20/12.5 hydrochlorothiazide he found his 10 mg doses of lisinopril and start taking it since last 2 days and he feels blood pressure is little better today.  Denies any dizziness chest pain pressure or shortness of breath  No recent change in his medication from his pain clinic.    Answers for HPI/ROS submitted by the patient on 12/8/2022  Do you check your blood pressure regularly outside of the clinic?: Yes  Are your blood pressures ever more than 140 on the top number (systolic) OR more than 90 on the bottom number  (diastolic)? (For example, greater than 140/90): Yes  Are you following a low salt diet?: Yes        Patient Active Problem List   Diagnosis     Fibromyalgia     Opioid type dependence, continuous (H)     Lumbar Disc Degeneration     Thyroid condition     PVD (posterior vitreous detachment), unspecified laterality     Cortical age-related cataract of both eyes     Sacroiliac pain        Current Outpatient Medications   Medication     aspirin (ASA) 325 MG tablet     BELBUCA 150 MCG FILM buccal film     cyclobenzaprine (FLEXERIL) 10 MG tablet     HYDROcodone-acetaminophen (NORCO)  MG per tablet     lisinopril (ZESTRIL) 10 MG tablet     tamsulosin (FLOMAX) 0.4 MG capsule     No current facility-administered medications for this visit.          Social Determinants of Health     Tobacco Use: Low Risk      Smoking Tobacco Use: Never     Smokeless Tobacco Use: Never     Passive Exposure: Not on file   Alcohol Use: Not on file   Financial Resource Strain: Not on file   Food Insecurity: Not on file   Transportation Needs: Not on file   Physical Activity: Not on file   Stress: Not on file   Social Connections: Not on file   Intimate Partner Violence: Not on file   Depression: Not at risk     PHQ-2 Score: 0   Housing Stability: Not on file        Review of Systems   Constitutional, HEENT, cardiovascular, pulmonary, GI, , musculoskeletal, neuro, skin, endocrine and psych systems are negative, except as otherwise noted.      Objective    /62 (BP Location: Left arm, Patient Position: Sitting, Cuff Size: Adult Regular)   Pulse 74   Wt 83 kg (182 lb 14.4 oz)   BMI 27.01 kg/m     LMP 06/01/2011   There is no height or weight on file to calculate BMI.  Physical Exam   GENERAL: healthy, alert and no distress  NECK: no adenopathy, no asymmetry, masses, or scars and thyroid normal to palpation  RESP: lungs clear to auscultation - no rales, rhonchi or wheezes  CV: regular rate and rhythm, normal S1 S2, no S3 or S4, no  murmur, click or rub, no peripheral edema and peripheral pulses strong  MS: no gross musculoskeletal defects noted, no edema    Heidi Chairez MD   [facility].  [phone]

## 2022-12-13 ENCOUNTER — TRANSFERRED RECORDS (OUTPATIENT)
Dept: HEALTH INFORMATION MANAGEMENT | Facility: CLINIC | Age: 72
End: 2022-12-13

## 2023-02-14 ENCOUNTER — TRANSFERRED RECORDS (OUTPATIENT)
Dept: HEALTH INFORMATION MANAGEMENT | Facility: CLINIC | Age: 73
End: 2023-02-14

## 2023-03-12 DIAGNOSIS — R35.1 NOCTURIA: ICD-10-CM

## 2023-03-12 RX ORDER — TAMSULOSIN HYDROCHLORIDE 0.4 MG/1
CAPSULE ORAL
Qty: 90 CAPSULE | Refills: 2 | Status: SHIPPED | OUTPATIENT
Start: 2023-03-12 | End: 2023-12-11

## 2023-03-12 NOTE — TELEPHONE ENCOUNTER
"Last Written Prescription Date:  1/21/22  Last Fill Quantity: 90,  # refills: 3   Last office visit provider:  12/9/22     Requested Prescriptions   Pending Prescriptions Disp Refills     tamsulosin (FLOMAX) 0.4 MG capsule [Pharmacy Med Name: TAMSULOSIN 0.4MG CAPSULES] 90 capsule 3     Sig: TAKE 1 CAPSULE(0.4 MG) BY MOUTH DAILY       Alpha Blockers Passed - 3/12/2023  1:53 AM        Passed - Blood pressure under 140/90 in past 12 months     BP Readings from Last 3 Encounters:   12/09/22 112/62   11/16/21 130/66   10/18/21 (!) 180/92                 Passed - Recent (12 mo) or future (30 days) visit within the authorizing provider's specialty     Patient has had an office visit with the authorizing provider or a provider within the authorizing providers department within the previous 12 mos or has a future within next 30 days. See \"Patient Info\" tab in inbasket, or \"Choose Columns\" in Meds & Orders section of the refill encounter.              Passed - Patient does not have Tadalafil, Vardenafil, or Sildenafil on their medication list        Passed - Medication is active on med list        Passed - Patient is 18 years of age or older             Marixa Chaves RN 03/12/23 11:03 AM  "

## 2023-04-01 ENCOUNTER — HEALTH MAINTENANCE LETTER (OUTPATIENT)
Age: 73
End: 2023-04-01

## 2023-04-25 ENCOUNTER — TRANSFERRED RECORDS (OUTPATIENT)
Dept: HEALTH INFORMATION MANAGEMENT | Facility: CLINIC | Age: 73
End: 2023-04-25
Payer: MEDICARE

## 2023-04-30 ENCOUNTER — OFFICE VISIT (OUTPATIENT)
Dept: FAMILY MEDICINE | Facility: CLINIC | Age: 73
End: 2023-04-30
Payer: MEDICARE

## 2023-04-30 VITALS
SYSTOLIC BLOOD PRESSURE: 186 MMHG | HEART RATE: 72 BPM | BODY MASS INDEX: 26.73 KG/M2 | TEMPERATURE: 98.6 F | OXYGEN SATURATION: 99 % | WEIGHT: 181 LBS | RESPIRATION RATE: 16 BRPM | DIASTOLIC BLOOD PRESSURE: 82 MMHG

## 2023-04-30 DIAGNOSIS — I10 BENIGN ESSENTIAL HYPERTENSION: Primary | ICD-10-CM

## 2023-04-30 PROCEDURE — 99214 OFFICE O/P EST MOD 30 MIN: CPT | Performed by: PHYSICIAN ASSISTANT

## 2023-04-30 NOTE — PROGRESS NOTES
Patient presents with:  Hypertension: Was taking 20 mg lisinopril then blood pressure was getting too low then they decreased his blood pressure medication to 10 mg but now its back up and all over the place x 5 days.      Clinical Decision Making:  Prescription drug management was reviewed for his hypertension in the office. Patient has a history of hypertension and has been treated with lisinopril is much as 40 mg once a day down to 20 mg/day because of his low blood pressure at times.  Currently his current dose is lisinopril 10 mg and he is increased that to lisinopril 20 mg.  Despite his change of dosing with lisinopril he is now having hypertension.  Close follow-up with primary care provider in the next 2 to 3 days for reevaluation of his hypertension protocol.  No new agent was added since he has not had a recent basic metabolic panel or creatinine clearance calculated.  Patient is instructed that if he should become symptomatic present to the emergency room in the interim between today's appointment and follow-up with primary care provider.  Questions were answered to his satisfaction before discharge.          ICD-10-CM    1. Benign essential hypertension  I10 PRIMARY CARE FOLLOW-UP SCHEDULING          Patient Instructions   Increase your lisinopril blood pressure medication to 15 mg.  You may obtain the 15 mg dosing by using a 10 mg pill and cutting it in half and adding the 5 mg    Follow-up with your primary care provider in 48 hours for reevaluation of your blood pressure treatment          HPI:  Donovan Yi is a 73 year old male who presents today for evaluation of elevated blood pressure.  Patient has been taking his blood pressure once per hour at home is noticed that its been elevated.  In the office currently is 186/82.  At this time, there are no symptoms that the patient would report to include headache, vision changes, chest arm or jaw pain, nausea vomiting diarrhea shortness of  breath dyspnea on exertion.  Patient shares that he has not been using alcohol nicotine and only two 2 ounce servings of coffee per day.  No weight gain and no salt in his food or packaged or prepackaged foods.  However, the patient has had insomnia and has had very poor sleep.     History obtained from chart review and the patient.    Problem List:  2021-11: Fibromyalgia  2021-11: Opioid type dependence, continuous (H)  2021-11: Lumbar Disc Degeneration  2021-06: Chronic hepatitis C without hepatic coma (H)  2021-06: Sacroiliac pain  2020-02: PVD (posterior vitreous detachment), unspecified laterality  2020-02: Cortical age-related cataract of both eyes  2015-12: Thyroid condition  Hepatitis, C Virus  Sacroiliitis      No past medical history on file.    Social History     Tobacco Use     Smoking status: Never     Smokeless tobacco: Never   Vaping Use     Vaping status: Not on file   Substance Use Topics     Alcohol use: Not on file       Review of Systems  As above in HPI otherwise negative.    Vitals:    04/30/23 1614   BP: (!) 186/82   Pulse: 72   Resp: 16   Temp: 98.6  F (37  C)   TempSrc: Oral   SpO2: 99%   Weight: 82.1 kg (181 lb)       General: Patient is resting comfortably no acute distress is afebrile  HEENT: Head is normocephalic atraumatic   eyes are PERRL EOMI sclera anicteric   LUNGS: Clear to auscultation bilaterally  HEART: Regular rate and rhythm  Skin: Without rash non-diaphoretic    Physical Exam    At the end of the encounter, I discussed results, diagnosis, medications. Discussed red flags for immediate return to clinic/ER, as well as indications for follow up if no improvement. Patient understood and agreed to plan. Patient was stable for discharge.

## 2023-04-30 NOTE — PATIENT INSTRUCTIONS
Increase your lisinopril blood pressure medication to 15 mg.  You may obtain the 15 mg dosing by using a 10 mg pill and cutting it in half and adding the 5 mg    Follow-up with your primary care provider in 48 hours for reevaluation of your blood pressure treatment

## 2023-05-02 ENCOUNTER — OFFICE VISIT (OUTPATIENT)
Dept: INTERNAL MEDICINE | Facility: CLINIC | Age: 73
End: 2023-05-02
Payer: MEDICARE

## 2023-05-02 VITALS
HEIGHT: 69 IN | DIASTOLIC BLOOD PRESSURE: 74 MMHG | BODY MASS INDEX: 27.11 KG/M2 | TEMPERATURE: 97.8 F | OXYGEN SATURATION: 96 % | SYSTOLIC BLOOD PRESSURE: 132 MMHG | HEART RATE: 84 BPM | RESPIRATION RATE: 16 BRPM | WEIGHT: 183 LBS

## 2023-05-02 DIAGNOSIS — I10 ESSENTIAL HYPERTENSION: Primary | ICD-10-CM

## 2023-05-02 DIAGNOSIS — I10 ESSENTIAL HYPERTENSION: ICD-10-CM

## 2023-05-02 PROCEDURE — 99214 OFFICE O/P EST MOD 30 MIN: CPT | Performed by: NURSE PRACTITIONER

## 2023-05-02 RX ORDER — AMLODIPINE BESYLATE 5 MG/1
5 TABLET ORAL DAILY
Qty: 90 TABLET | Refills: 0 | Status: SHIPPED | OUTPATIENT
Start: 2023-05-02 | End: 2023-05-23

## 2023-05-02 RX ORDER — LISINOPRIL 10 MG/1
20 TABLET ORAL DAILY
Qty: 90 TABLET | Refills: 1 | Status: SHIPPED | OUTPATIENT
Start: 2023-05-02 | End: 2023-08-23

## 2023-05-02 NOTE — PATIENT INSTRUCTIONS
Add amlodipine to your medication regimen.  Take a 5 mg tablet daily.    Continue on the lisinopril 20 mg dose for now.    Follow-up with PCP for blood pressure recheck in 2 weeks.  Bring a log of blood pressure measurements with you to that appointment, along with your BP cuff.    Do not check your blood pressure more than 2 times per day.    Limit salt.

## 2023-05-02 NOTE — PROGRESS NOTES
Assessment & Plan     Essential hypertension  Uncontrolled.  Add amlodipine to his regimen.  Continue on lisinopril 20 mg daily.  Follow-up with PCP in 2 weeks to recheck blood pressure    - amLODIPine (NORVASC) 5 MG tablet; Take 1 tablet (5 mg) by mouth daily  - lisinopril (ZESTRIL) 10 MG tablet; Take 2 tablets (20 mg) by mouth daily    Patient Instructions   Add amlodipine to your medication regimen.  Take a 5 mg tablet daily.    Continue on the lisinopril 20 mg dose for now.    Follow-up with PCP for blood pressure recheck in 2 weeks.  Bring a log of blood pressure measurements with you to that appointment, along with your BP cuff.    Do not check your blood pressure more than 2 times per day.    Limit salt.    THONG Sotomayor [facility]   Phani is a 73 year old, presenting for the following health issues:    Blood pressures been elevated for the last week or so.  Diastolics have been greater than 90, systolic measurements are typically in the 150-170 range.    Otherwise feeling well and has no acute complaints.    Hypertension (BP has been elevated over the last week )        5/2/2023     3:35 PM   Additional Questions   Roomed by Loni STEVENS     History of Present Illness       Hypertension: He presents for follow up of hypertension.  He does check blood pressure  regularly outside of the clinic. Outside blood pressures have been over 140/90. He follows a low salt diet.     He eats 0-1 servings of fruits and vegetables daily.He consumes 0 sweetened beverage(s) daily.He exercises with enough effort to increase his heart rate 30 to 60 minutes per day.  He exercises with enough effort to increase his heart rate 7 days per week.   He is taking medications regularly.         Review of Systems   Constitutional, HEENT, cardiovascular, pulmonary, gi and gu systems are negative, except as otherwise noted.      Objective    /74 (BP Location: Right arm, Patient  "Position: Sitting, Cuff Size: Adult Regular)   Pulse 84   Temp 97.8  F (36.6  C)   Resp 16   Ht 1.753 m (5' 9\")   Wt 83 kg (183 lb)   SpO2 96%   BMI 27.02 kg/m    Body mass index is 27.02 kg/m .  Physical Exam     Healthy-appearing and in no acute distress        "

## 2023-05-18 ENCOUNTER — OFFICE VISIT (OUTPATIENT)
Dept: FAMILY MEDICINE | Facility: CLINIC | Age: 73
End: 2023-05-18
Payer: MEDICARE

## 2023-05-18 VITALS
HEART RATE: 75 BPM | BODY MASS INDEX: 27.13 KG/M2 | WEIGHT: 179 LBS | OXYGEN SATURATION: 99 % | SYSTOLIC BLOOD PRESSURE: 126 MMHG | RESPIRATION RATE: 20 BRPM | DIASTOLIC BLOOD PRESSURE: 74 MMHG | HEIGHT: 68 IN

## 2023-05-18 DIAGNOSIS — I10 ESSENTIAL HYPERTENSION: Primary | ICD-10-CM

## 2023-05-18 PROBLEM — M79.7 FIBROMYALGIA: Status: RESOLVED | Noted: 2021-11-30 | Resolved: 2023-05-18

## 2023-05-18 PROCEDURE — G0009 ADMIN PNEUMOCOCCAL VACCINE: HCPCS | Performed by: FAMILY MEDICINE

## 2023-05-18 PROCEDURE — 90677 PCV20 VACCINE IM: CPT | Performed by: FAMILY MEDICINE

## 2023-05-18 PROCEDURE — 99212 OFFICE O/P EST SF 10 MIN: CPT | Mod: 25 | Performed by: FAMILY MEDICINE

## 2023-05-18 NOTE — PROGRESS NOTES
"  Assessment & Plan     Patient presents with:  Follow Up: BP check       Donovan was seen today for follow up.    Diagnoses and all orders for this visit:    Essential hypertension    Other orders  -     Pneumococcal 20 Valent Conjugate (PCV20)  -     PRIMARY CARE FOLLOW-UP SCHEDULING; Future       Blood pressure well controlled on current treatment plan     BMI:   Estimated body mass index is 27.02 kg/m  as calculated from the following:    Height as of this encounter: 1.734 m (5' 8.25\").    Weight as of this encounter: 81.2 kg (179 lb).   Weight management plan: Discussed healthy diet and exercise guidelines        No follow-ups on file.   Follow-up Visit   Expected date:  Jun 18, 2023 (Approximate)      Follow Up Appointment Details:     Follow-up with whom?: PCP    Follow-Up for what?: Medicare Wellness    Welcome or Annual?: Annual Wellness    How?: In Person                      Subjective   Donovan Yi 73 year old who presents for the following health issues           HPI   History of Present Illness       Hypertension: He presents for follow up of hypertension.  He does check blood pressure  regularly outside of the clinic. Outside blood pressures have been over 140/90. He follows a low salt diet.     He eats 0-1 servings of fruits and vegetables daily.He consumes 0 sweetened beverage(s) daily.He exercises with enough effort to increase his heart rate 10 to 19 minutes per day.  He exercises with enough effort to increase his heart rate 4 days per week.   He is taking medications regularly.    Answers for HPI/ROS submitted by the patient on 5/18/2023  Do you check your blood pressure regularly outside of the clinic?: Yes  Are your blood pressures ever more than 140 on the top number (systolic) OR more than 90 on the bottom number (diastolic)? (For example, greater than 140/90): Yes  Are you following a low salt diet?: Yes  How many servings of fruits and vegetables do you eat daily?: 0-1  On " "average, how many sweetened beverages do you drink each day (Examples: soda, juice, sweet tea, etc.  Do NOT count diet or artificially sweetened beverages)?: 0  How many minutes a day do you exercise enough to make your heart beat faster?: 10 to 19  How many days a week do you exercise enough to make your heart beat faster?: 4  How many days per week do you miss taking your medication?: 0        Patient Active Problem List   Diagnosis     Opioid type dependence, continuous (H)     Lumbar Disc Degeneration     PVD (posterior vitreous detachment), unspecified laterality     Cortical age-related cataract of both eyes     Sacroiliac pain     Essential hypertension        Current Outpatient Medications   Medication     amLODIPine (NORVASC) 5 MG tablet     aspirin (ASA) 325 MG tablet     BELBUCA 150 MCG FILM buccal film     cyclobenzaprine (FLEXERIL) 10 MG tablet     HYDROcodone-acetaminophen (NORCO)  MG per tablet     lisinopril (ZESTRIL) 10 MG tablet     tamsulosin (FLOMAX) 0.4 MG capsule     No current facility-administered medications for this visit.          Social Determinants of Health     Tobacco Use: Low Risk  (5/18/2023)    Patient History      Smoking Tobacco Use: Never      Smokeless Tobacco Use: Never      Passive Exposure: Not on file   Alcohol Use: Not on file   Financial Resource Strain: Not on file   Food Insecurity: Not on file   Transportation Needs: Not on file   Physical Activity: Not on file   Stress: Not on file   Social Connections: Not on file   Intimate Partner Violence: Not on file   Depression: Not at risk (5/2/2023)    PHQ-2      PHQ-2 Score: 0   Housing Stability: Not on file        Review of Systems   Constitutional, cardiovascular, pulmonary, GI, , musculoskeletal, neuro, skin, endocrine and psych systems are negative, except as otherwise noted.      Objective      /74   Pulse 75   Resp 20   Ht 1.734 m (5' 8.25\")   Wt 81.2 kg (179 lb)   SpO2 99%   BMI 27.02 kg/m   "     Physical Exam   GENERAL: healthy, alert and no distress  MS: no gross musculoskeletal defects noted, no edema    Heidi Chairez MD   [facility].  [phone]

## 2023-05-18 NOTE — PATIENT INSTRUCTIONS
Patient Education   Personalized Prevention Plan  You are due for the preventive services outlined below.  Your care team is available to assist you in scheduling these services.  If you have already completed any of these items, please share that information with your care team to update in your medical record.  Health Maintenance Due   Topic Date Due     Discuss Advance Care Planning  Never done     Zoster (Shingles) Vaccine (1 of 2) Never done     Annual Wellness Visit  Never done     Pneumococcal Vaccine (1 - PCV) Never done     Diptheria Tetanus Pertussis (DTAP/TDAP/TD) Vaccine (1 - Tdap) 06/10/2021     Flu Vaccine (1) 09/01/2022     COVID-19 Vaccine (6 - Pfizer series) 04/01/2023        Patient Education   Personalized Prevention Plan  You are due for the preventive services outlined below.  Your care team is available to assist you in scheduling these services.  If you have already completed any of these items, please share that information with your care team to update in your medical record.  Health Maintenance Due   Topic Date Due     Discuss Advance Care Planning  Never done     Zoster (Shingles) Vaccine (1 of 2) Never done     Pneumococcal Vaccine (1 - PCV) Never done     Diptheria Tetanus Pertussis (DTAP/TDAP/TD) Vaccine (1 - Tdap) 06/10/2021     Flu Vaccine (1) 09/01/2022     COVID-19 Vaccine (6 - Pfizer series) 04/01/2023

## 2023-05-23 ENCOUNTER — OFFICE VISIT (OUTPATIENT)
Dept: INTERNAL MEDICINE | Facility: CLINIC | Age: 73
End: 2023-05-23
Payer: MEDICARE

## 2023-05-23 VITALS
HEART RATE: 81 BPM | SYSTOLIC BLOOD PRESSURE: 154 MMHG | WEIGHT: 177.5 LBS | OXYGEN SATURATION: 97 % | BODY MASS INDEX: 26.9 KG/M2 | TEMPERATURE: 98.2 F | DIASTOLIC BLOOD PRESSURE: 82 MMHG | HEIGHT: 68 IN

## 2023-05-23 DIAGNOSIS — R20.2 PARESTHESIA: ICD-10-CM

## 2023-05-23 DIAGNOSIS — I10 ESSENTIAL HYPERTENSION: Primary | ICD-10-CM

## 2023-05-23 LAB
ANION GAP SERPL CALCULATED.3IONS-SCNC: 11 MMOL/L (ref 7–15)
BASOPHILS # BLD AUTO: 0 10E3/UL (ref 0–0.2)
BASOPHILS NFR BLD AUTO: 0 %
BUN SERPL-MCNC: 20.9 MG/DL (ref 8–23)
CALCIUM SERPL-MCNC: 9.5 MG/DL (ref 8.8–10.2)
CHLORIDE SERPL-SCNC: 102 MMOL/L (ref 98–107)
CREAT SERPL-MCNC: 0.97 MG/DL (ref 0.67–1.17)
DEPRECATED HCO3 PLAS-SCNC: 26 MMOL/L (ref 22–29)
EOSINOPHIL # BLD AUTO: 0.1 10E3/UL (ref 0–0.7)
EOSINOPHIL NFR BLD AUTO: 2 %
ERYTHROCYTE [DISTWIDTH] IN BLOOD BY AUTOMATED COUNT: 13.2 % (ref 10–15)
GFR SERPL CREATININE-BSD FRML MDRD: 81 ML/MIN/1.73M2
GLUCOSE SERPL-MCNC: 112 MG/DL (ref 70–99)
HCT VFR BLD AUTO: 41.6 % (ref 40–53)
HGB BLD-MCNC: 13.6 G/DL (ref 13.3–17.7)
IMM GRANULOCYTES # BLD: 0 10E3/UL
IMM GRANULOCYTES NFR BLD: 0 %
LYMPHOCYTES # BLD AUTO: 1.1 10E3/UL (ref 0.8–5.3)
LYMPHOCYTES NFR BLD AUTO: 19 %
MCH RBC QN AUTO: 28.7 PG (ref 26.5–33)
MCHC RBC AUTO-ENTMCNC: 32.7 G/DL (ref 31.5–36.5)
MCV RBC AUTO: 88 FL (ref 78–100)
MONOCYTES # BLD AUTO: 0.5 10E3/UL (ref 0–1.3)
MONOCYTES NFR BLD AUTO: 8 %
NEUTROPHILS # BLD AUTO: 4.3 10E3/UL (ref 1.6–8.3)
NEUTROPHILS NFR BLD AUTO: 70 %
PLATELET # BLD AUTO: 275 10E3/UL (ref 150–450)
POTASSIUM SERPL-SCNC: 4.4 MMOL/L (ref 3.4–5.3)
RBC # BLD AUTO: 4.74 10E6/UL (ref 4.4–5.9)
SODIUM SERPL-SCNC: 139 MMOL/L (ref 136–145)
WBC # BLD AUTO: 6.1 10E3/UL (ref 4–11)

## 2023-05-23 PROCEDURE — 99214 OFFICE O/P EST MOD 30 MIN: CPT | Performed by: PHYSICIAN ASSISTANT

## 2023-05-23 PROCEDURE — 80048 BASIC METABOLIC PNL TOTAL CA: CPT | Performed by: PHYSICIAN ASSISTANT

## 2023-05-23 PROCEDURE — 85025 COMPLETE CBC W/AUTO DIFF WBC: CPT | Performed by: PHYSICIAN ASSISTANT

## 2023-05-23 PROCEDURE — 36415 COLL VENOUS BLD VENIPUNCTURE: CPT | Performed by: PHYSICIAN ASSISTANT

## 2023-05-23 RX ORDER — AMLODIPINE BESYLATE 10 MG/1
10 TABLET ORAL DAILY
Qty: 90 TABLET | Refills: 0 | Status: SHIPPED | OUTPATIENT
Start: 2023-05-23 | End: 2024-02-14

## 2023-05-23 ASSESSMENT — PAIN SCALES - GENERAL: PAINLEVEL: MILD PAIN (3)

## 2023-05-23 NOTE — PROGRESS NOTES
"  Assessment & Plan     Essential hypertension  Not under control  Increasing amlodipine to 10 mg daily  Continue to monitor bp  F/u with PCP office as scheduled in 3 weeks     - amLODIPine (NORVASC) 10 MG tablet; Take 1 tablet (10 mg) by mouth daily  - Basic metabolic panel  (Ca, Cl, CO2, Creat, Gluc, K, Na, BUN)  - CBC with platelets and differential    Paresthesia      - Basic metabolic panel  (Ca, Cl, CO2, Creat, Gluc, K, Na, BUN)  - CBC with platelets and differential                 VICTORIA Sykes [facility] ANGELINE Jeronimo is a 73 year old, presenting for the following health issues:  Foot Problems and Hypertension        5/2/2023     3:35 PM   Additional Questions   Roomed by Loni STEVENS     History of Present Illness       Hypertension: He presents for follow up of hypertension.  He does check blood pressure  regularly outside of the clinic. Outside blood pressures have been over 140/90. He follows a low salt diet.     He eats 0-1 servings of fruits and vegetables daily.He consumes 0 sweetened beverage(s) daily.He exercises with enough effort to increase his heart rate 10 to 19 minutes per day.  He exercises with enough effort to increase his heart rate 4 days per week.   He is taking medications regularly.  Patient is seeing podiatry regarding foot issues  Plan to do MRI due to pain and tingling    Patient has not had any labs done since 2021.     Blood pressure medication changed at the beginning of May- addition of amlodipine             Review of Systems   Constitutional, HEENT, cardiovascular, pulmonary, gi and gu systems are negative, except as otherwise noted.      Objective    BP (!) 154/82 (BP Location: Right arm, Patient Position: Sitting, Cuff Size: Adult Regular)   Pulse 81   Temp 98.2  F (36.8  C) (Oral)   Ht 1.727 m (5' 8\")   Wt 80.5 kg (177 lb 8 oz)   SpO2 97%   BMI 26.99 kg/m    Body mass index is 26.99 kg/m .  Physical Exam   GENERAL: " healthy, alert and no distress  RESP: lungs clear to auscultation - no rales, rhonchi or wheezes  CV: regular rates and rhythm and normal S1 S2, no S3 or S4  MS: no gross musculoskeletal defects noted, no edema

## 2023-06-02 ENCOUNTER — TELEPHONE (OUTPATIENT)
Dept: INTERNAL MEDICINE | Facility: CLINIC | Age: 73
End: 2023-06-02
Payer: MEDICARE

## 2023-06-02 NOTE — TELEPHONE ENCOUNTER
Reason for Call:  Appointment Request    Patient requesting this type of appt: Chronic Diease Management/Medication/Follow-Up    Requested provider: Lakshmi Molina    Reason patient unable to be scheduled: Not with their preferred provider    When does patient want to be seen/preferred time: 1-2 weeks    Comments: Looking to be seen by Lakshmi because she is the last provider who altered medications. Also seemed to understand each other well. Patient aware that Lakshmi is now acute care only, wants to know if she'd be willing to make an exception.    Could we send this information to you in [facility] [facility] [employer] or would you prefer to receive a phone call?:   Patient would like to be contacted via [facility] [facility] [employer]    Call taken on 6/2/2023 at 3:33 PM by KEISHA LOMELI

## 2023-06-06 NOTE — TELEPHONE ENCOUNTER
Sent my chart message informing patient that Lakshmi Molina is acute care only. Per Lakshmi patient needs to schedule with PCP

## 2023-06-13 ENCOUNTER — OFFICE VISIT (OUTPATIENT)
Dept: FAMILY MEDICINE | Facility: CLINIC | Age: 73
End: 2023-06-13
Attending: PHYSICIAN ASSISTANT
Payer: MEDICARE

## 2023-06-13 VITALS
RESPIRATION RATE: 16 BRPM | SYSTOLIC BLOOD PRESSURE: 114 MMHG | HEART RATE: 68 BPM | OXYGEN SATURATION: 99 % | TEMPERATURE: 98 F | DIASTOLIC BLOOD PRESSURE: 65 MMHG | BODY MASS INDEX: 26.54 KG/M2 | HEIGHT: 68 IN | WEIGHT: 175.1 LBS

## 2023-06-13 DIAGNOSIS — I73.9 CLAUDICATION (H): Primary | ICD-10-CM

## 2023-06-13 DIAGNOSIS — E78.5 HYPERLIPIDEMIA LDL GOAL <100: ICD-10-CM

## 2023-06-13 DIAGNOSIS — R20.0 NUMBNESS AND TINGLING OF FOOT: ICD-10-CM

## 2023-06-13 DIAGNOSIS — R20.2 NUMBNESS AND TINGLING OF FOOT: ICD-10-CM

## 2023-06-13 PROCEDURE — 82607 VITAMIN B-12: CPT

## 2023-06-13 PROCEDURE — 99213 OFFICE O/P EST LOW 20 MIN: CPT

## 2023-06-13 PROCEDURE — 36415 COLL VENOUS BLD VENIPUNCTURE: CPT

## 2023-06-13 PROCEDURE — 83735 ASSAY OF MAGNESIUM: CPT

## 2023-06-13 PROCEDURE — 80061 LIPID PANEL: CPT

## 2023-06-13 NOTE — PROGRESS NOTES
"  Assessment & Plan   Problem List Items Addressed This Visit        Nervous and Auditory    Claudication (H) - Primary     Description of pain with walking and improvement in pain with discontinuation of activities is consistent with claudication. Will obtain ABIs to confirm this diagnosis. Discussed the importance of optimizing cardiovascular risk factors with this likely diagnosis; will update cholesterol panel today. BP discussed as documented elsewhere. Is not a smoker. Patient is motivated to improve symptoms as he is active at home; will plan to refer to vascular medicine for further evaluation/treatment. Patient has had normal BMP last month, but did draw magnesium and Vitamin B12 after discussion with patient for neuropathy in the right foot. If normal, numbness is likely just secondary to vascular insufficiency. Patient expressed an understanding of and agreement with this plan. All questions were answered.          Relevant Orders    Magnesium    Vitamin B12    US WEN Doppler with Exercise Bilateral    Physical Therapy Referral   Other Visit Diagnoses     Hyperlipidemia LDL goal <100        Relevant Orders    Lipid Profile (Chol, Trig, HDL, LDL calc)    Numbness and tingling of foot        Relevant Orders    Magnesium    Vitamin B12         HIREN Moody CNP  [facility]imo is a 73 year old, presenting for the following health issues:  Musculoskeletal Problem (Bilateral pain in legs.  Left foot goes numb at evening )        6/13/2023     1:36 PM   Additional Questions   Roomed by ac   Accompanied by self     -Feels as if his blood pressure is labile  -Has \"cut back\" on his medication.   -Not wanting to do anything about blood pressure today. Plans to continue his current regimen and work with his PCP  -More pressing is his bilateral calf pain and cramping. Reports that he will have acute onset of pain with walking. Pain will cease with rest. He feels as " "if both of his legs fatigue very quickly.  -Has some additional left foot numbness and cramping.  -These symptoms have been present for a while per his report, but he feels as if they are worsening to the point where he struggles doing daily activities such as mowing his lawn.  -Denies tobacco use. Did smoke >40 years ago  -Is well hydrated  -Denies any redness or swelling in the calves associated with the pain          Musculoskeletal Problem    History of Present Illness       Reason for visit:  Foot cramps, leg pain, leg fatigue  Symptom onset:  More than a month  Symptoms include:  Cramps, pain  Symptom intensity:  Moderate  Symptom progression:  Worsening  Had these symptoms before:  No    He eats 0-1 servings of fruits and vegetables daily.He consumes 0 sweetened beverage(s) daily.He exercises with enough effort to increase his heart rate 10 to 19 minutes per day.  He exercises with enough effort to increase his heart rate 5 days per week.   He is taking medications regularly.     Review of Systems         Objective    /65 (BP Location: Left arm, Patient Position: Sitting, Cuff Size: Adult Regular)   Pulse 68   Temp 98  F (36.7  C) (Oral)   Resp 16   Ht 1.727 m (5' 8\")   Wt 79.4 kg (175 lb 1.6 oz)   SpO2 99%   BMI 26.62 kg/m    Body mass index is 26.62 kg/m .     Physical Exam  Vitals and nursing note reviewed.   Constitutional:       General: He is not in acute distress.     Appearance: Normal appearance.   Cardiovascular:      Rate and Rhythm: Normal rate and regular rhythm.      Pulses:           Dorsalis pedis pulses are 2+ on the right side and 2+ on the left side.        Posterior tibial pulses are 1+ on the right side and 1+ on the left side.      Heart sounds: Normal heart sounds.   Pulmonary:      Effort: Pulmonary effort is normal. No respiratory distress.   Musculoskeletal:         General: Normal range of motion.   Feet:      Right foot:      Skin integrity: No blister, skin breakdown, " erythema or warmth.      Toenail Condition: Right toenails are normal.      Left foot:      Skin integrity: No blister, skin breakdown, erythema or warmth.      Toenail Condition: Left toenails are normal.      Comments: Bilateral lower extremities pale  No evidence of impaired wound healing  Neurological:      General: No focal deficit present.      Mental Status: He is alert and oriented to person, place, and time.   Psychiatric:         Mood and Affect: Mood normal.

## 2023-06-14 ENCOUNTER — MYC MEDICAL ADVICE (OUTPATIENT)
Dept: FAMILY MEDICINE | Facility: CLINIC | Age: 73
End: 2023-06-14
Payer: MEDICARE

## 2023-06-14 LAB
CHOLEST SERPL-MCNC: 212 MG/DL
HDLC SERPL-MCNC: 49 MG/DL
LDLC SERPL CALC-MCNC: 141 MG/DL
MAGNESIUM SERPL-MCNC: 2 MG/DL (ref 1.7–2.3)
NONHDLC SERPL-MCNC: 163 MG/DL
TRIGL SERPL-MCNC: 109 MG/DL
VIT B12 SERPL-MCNC: 586 PG/ML (ref 232–1245)

## 2023-06-14 NOTE — ASSESSMENT & PLAN NOTE
Description of pain with walking and improvement in pain with discontinuation of activities is consistent with claudication. Will obtain ABIs to confirm this diagnosis. Discussed the importance of optimizing cardiovascular risk factors with this likely diagnosis; will update cholesterol panel today. BP discussed as documented elsewhere. Is not a smoker. Patient is motivated to improve symptoms as he is active at home; will plan to refer to vascular medicine for further evaluation/treatment. Patient has had normal BMP last month, but did draw magnesium and Vitamin B12 after discussion with patient for neuropathy in the right foot. If normal, numbness is likely just secondary to vascular insufficiency. Patient expressed an understanding of and agreement with this plan. All questions were answered.

## 2023-06-15 DIAGNOSIS — I73.9 CLAUDICATION (H): Primary | ICD-10-CM

## 2023-06-20 ENCOUNTER — TRANSFERRED RECORDS (OUTPATIENT)
Dept: HEALTH INFORMATION MANAGEMENT | Facility: CLINIC | Age: 73
End: 2023-06-20
Payer: MEDICARE

## 2023-07-19 ENCOUNTER — THERAPY VISIT (OUTPATIENT)
Dept: PHYSICAL THERAPY | Facility: REHABILITATION | Age: 73
End: 2023-07-19
Payer: MEDICARE

## 2023-07-19 DIAGNOSIS — R68.89 IMPAIRED TOLERANCE OF ACTIVITY: Primary | ICD-10-CM

## 2023-07-19 DIAGNOSIS — M62.81 MUSCLE WEAKNESS (GENERALIZED): ICD-10-CM

## 2023-07-19 DIAGNOSIS — I73.9 CLAUDICATION (H): ICD-10-CM

## 2023-07-19 PROCEDURE — 97110 THERAPEUTIC EXERCISES: CPT | Mod: GP

## 2023-07-19 PROCEDURE — 97161 PT EVAL LOW COMPLEX 20 MIN: CPT | Mod: GP

## 2023-07-19 NOTE — PROGRESS NOTES
PHYSICAL THERAPY EVALUATION  Type of Visit: Evaluation    See electronic medical record for Abuse and Falls Screening details.    Subjective       Presenting condition or subjective complaint: Phani here for PT evaluation. He states he has been experiencing significant calf and lower leg pain with walking that has progressed over the past year. It improves with rest. He went through testing with his doctor and found out he has claudication. He initially noticed he was getting pain in his calves and it didn't make sense to him because he works out a lot. He'd make a couple trips up/down the stairs and his calves would be very painful.  Started walking the dog again and it's painful. States he is a fall risk and has been for awhile- describes to therapist that he occasionally will black out when walking which leads to a fall- says he has not addressed it with his doctor yet and does not want to address it yet, wants to figure this out first. When he's tense it makes the pain worse. Becomes painful within a couple blocks. The uneven ground really is painful, as well. The pain is only in the calves and feet/arches, doesn't go up past the knees at all. Feet go numb when it's really bad. Also gets cramps in L foot at night. This all started about a year ago. Works out 3 days a week at a gym, does mostly core and LE strengthening- uses the heavy weights. Also plays golf 1x/week but takes a cart d/t the pain.  Date of onset: 06/13/23 (order date)    Relevant medical history: Hepatitis; High blood pressure     Prior diagnostic imaging/testing results:     Ankle-brachial indices 6/27/2023 demonstrate borderline right ankle-brachial index at rest and abnormal left ankle-brachial index at rest suggesting mild arterial insufficiency. Abnormal arterial waveforms are present suggesting the presence of arterial disease.  Arterial ultrasound of bilateral lower extremities 6/27/2023 demonstrates bilateral severe (75-99%) stenosis of  the superficial femoral arteries.    Prior therapy history for the same diagnosis, illness or injury: No      Prior Level of Function  Transfers: Independent  Ambulation: Independent  ADL: Independent    Living Environment  Social support: With a significant other or spouse   Type of home: House   Stairs to enter the home: Yes 3 Is there a railing: No   Ramp: No   Stairs inside the home: Yes 12 Is there a railing: Yes   Help at home: None  Equipment owned: [employer]     Employment: No    Hobbies/Interests: Golf    Patient goals for therapy: Walk further without pain    Pain assessment: 6-7/10 at worst, 0/10 at best with rest      Objective   Cognitive Status Examination  Orientation: Oriented to person, place and time   Level of Consciousness: Alert  Follows Commands and Answers Questions: 100% of the time  Personal Safety and Judgement: Intact  Memory: Intact    INTEGUMENTARY: Intact  POSTURE: WNL  RANGE OF MOTION: LE ROM WNL  STRENGTH: LE Strength WFL  All LE motions 4+/5 B, with exception of B knee extension 4/5 B     TRANSFERS: WNL    GAIT: Ambulates at a a good pace with normal gait mechanics and no evidence for instability. See testing section below for details regarding 6MWT.   Level of Maurice: WNL  Assistive Device(s): None  Gait Deviations: WNL  Gait Distance: 10'    BALANCE: [facility]  did not want to assess balance today     5x STS no UE use: 10.5 seconds, no pain noted in LEs   6MWT: 1140 ft, pain beginning around 3.5 minutes with mild discomfort in feet/arches, and calves; pain progressing from here until end of test although reports only 1/10 at end.        Assessment & Plan   CLINICAL IMPRESSIONS  Medical Diagnosis: Claudication    Treatment Diagnosis: Impaired activity tolerance   Impression/Assessment: Patient is a 73 year old male with decreased activity tolerance with walking complaints.  The following significant findings have been identified: Decreased strength, Impaired muscle performance and  Decreased activity tolerance. These impairments interfere with their ability to perform recreational activities, household chores, household mobility and community mobility as compared to previous level of function.     Clinical Decision Making (Complexity):  Clinical Presentation: Evolving/Changing  Clinical Presentation Rationale: based on medical and personal factors listed in PT evaluation  Clinical Decision Making (Complexity): Low complexity    PLAN OF CARE  Treatment Interventions:  Interventions: Gait Training, Manual Therapy, Neuromuscular Re-education, Therapeutic Activity, Therapeutic Exercise    Long Term Goals     PT Goal 1  Goal Identifier: 6MWT  Goal Description: Phani will increase distance walked on 6MWT by 10% of initial value without any reports of pain or discomfort in LEs in order to improve activity tolerance to walk dog.  Target Date: 10/16/23  PT Goal 2  Goal Identifier: Walking tolerance  Goal Description: Phani will be able to walk for 20 minutes on 2 separate occasions without any LE discomfort or pain in order to improve activity tolerance for daily activities.  Target Date: 10/16/23  PT Goal 3  Goal Identifier: HEP  Goal Description: Phani will demonstrate compliance to HEP in order to further progress activity tolerance independently at home.  Target Date: 10/16/23      Frequency of Treatment: 1x/week to every other week  Duration of Treatment: 90 days    Education Assessment:   Learner/Method: Patient  Education Comments: results of assessment, HEP, POC    Risks and benefits of evaluation/treatment have been explained.   Patient/Family/caregiver agrees with Plan of Care.     Evaluation Time:     PT Eval, Low Complexity Minutes (70749): 30     Signing Clinician: NATE JAQUEZ PT      [facility]                                                                                   OUTPATIENT PHYSICAL THERAPY      PLAN OF TREATMENT FOR OUTPATIENT REHABILITATION    Patient's Last Name, First Name, Donovan Robb YOB: 1950   Provider's Name   [facility]   Medical Record No.  [id number]     Onset Date: 06/13/23 (order date)  Start of Care Date: 07/19/23     Medical Diagnosis:  Claudication      PT Treatment Diagnosis:  Impaired activity tolerance Plan of Treatment  Frequency/Duration: 1x/week to every other week/ 90 days    Certification date from 07/19/23 to 10/16/23         See note for plan of treatment details and functional goals     NATE JAQUEZ, PT                         I CERTIFY THE NEED FOR THESE SERVICES FURNISHED UNDER        THIS PLAN OF TREATMENT AND WHILE UNDER MY CARE     (Physician attestation of this document indicates review and certification of the therapy plan).                  Referring Provider:  Molly Nesbitt      Initial Assessment  See Epic Evaluation- Start of Care Date: 07/19/23

## 2023-08-15 ENCOUNTER — TRANSFERRED RECORDS (OUTPATIENT)
Dept: HEALTH INFORMATION MANAGEMENT | Facility: CLINIC | Age: 73
End: 2023-08-15
Payer: MEDICARE

## 2023-08-17 ENCOUNTER — THERAPY VISIT (OUTPATIENT)
Dept: PHYSICAL THERAPY | Facility: REHABILITATION | Age: 73
End: 2023-08-17
Payer: MEDICARE

## 2023-08-17 DIAGNOSIS — I73.9 CLAUDICATION (H): Primary | ICD-10-CM

## 2023-08-17 PROCEDURE — 97116 GAIT TRAINING THERAPY: CPT | Mod: GP

## 2023-08-17 PROCEDURE — 97110 THERAPEUTIC EXERCISES: CPT | Mod: GP

## 2023-08-23 DIAGNOSIS — I10 ESSENTIAL HYPERTENSION: ICD-10-CM

## 2023-08-23 RX ORDER — LISINOPRIL 10 MG/1
20 TABLET ORAL DAILY
Qty: 90 TABLET | Refills: 1 | Status: SHIPPED | OUTPATIENT
Start: 2023-08-23 | End: 2024-04-23

## 2023-08-23 NOTE — TELEPHONE ENCOUNTER
"Routing refill request to provider for review/approval because:  Blood pressure failed  Early refill request    Last Written Prescription Date:  5/2/2023  Last Fill Quantity: 90,  # refills: 1   Last office visit provider:  6/13/2023     Requested Prescriptions   Pending Prescriptions Disp Refills    lisinopril (ZESTRIL) 10 MG tablet 90 tablet 1     Sig: Take 2 tablets (20 mg) by mouth daily       ACE Inhibitors (Including Combos) Protocol Passed - 8/23/2023 11:12 AM        Passed - Blood pressure under 140/90 in past 12 months     BP Readings from Last 3 Encounters:   06/13/23 114/65   05/23/23 (!) 154/82   05/18/23 126/74                 Passed - Recent (12 mo) or future (30 days) visit within the authorizing provider's specialty     Patient has had an office visit with the authorizing provider or a provider within the authorizing providers department within the previous 12 mos or has a future within next 30 days. See \"Patient Info\" tab in inbasket, or \"Choose Columns\" in Meds & Orders section of the refill encounter.              Passed - Medication is active on med list        Passed - Patient is age 18 or older        Passed - Normal serum creatinine on file in past 12 months     Recent Labs   Lab Test 05/23/23  1417   CR 0.97       Ok to refill medication if creatinine is low          Passed - Normal serum potassium on file in past 12 months     Recent Labs   Lab Test 05/23/23  1417   POTASSIUM 4.4                  Margoth Szymanski RN 08/23/23 1:37 PM  "

## 2023-08-31 ENCOUNTER — THERAPY VISIT (OUTPATIENT)
Dept: PHYSICAL THERAPY | Facility: REHABILITATION | Age: 73
End: 2023-08-31
Payer: MEDICARE

## 2023-08-31 DIAGNOSIS — M62.81 MUSCLE WEAKNESS (GENERALIZED): ICD-10-CM

## 2023-08-31 DIAGNOSIS — R68.89 IMPAIRED TOLERANCE OF ACTIVITY: ICD-10-CM

## 2023-08-31 DIAGNOSIS — I73.9 CLAUDICATION (H): Primary | ICD-10-CM

## 2023-08-31 PROCEDURE — 97116 GAIT TRAINING THERAPY: CPT | Mod: GP

## 2023-10-16 ENCOUNTER — TRANSFERRED RECORDS (OUTPATIENT)
Dept: HEALTH INFORMATION MANAGEMENT | Facility: CLINIC | Age: 73
End: 2023-10-16
Payer: MEDICARE

## 2023-12-07 NOTE — PROGRESS NOTES
DISCHARGE  Reason for Discharge: Patient chooses to discontinue therapy.  Patient has failed to schedule further appointments.  Patient cancelled or no showed for remaining appointments    Equipment Issued: NA    Discharge Plan: Patient to continue home program as tolerated    Referring Provider:  Molly Thomason, PT        08/31/23 0500   Appointment Info   Signing clinician's name / credentials Rachel Haile, PT, DPT   Visits Used 3/10   Medical Diagnosis Claudication   PT Tx Diagnosis Impaired activity tolerance   Quick Adds Certification   Progress Note/Certification   Start of Care Date 07/19/23   Onset of illness/injury or Date of Surgery 06/13/23  (order date)   Therapy Frequency 1x/week to every other week   Predicted Duration 90 days   Certification date from 07/19/23   Certification date to 10/16/23   Progress Note Due Date 10/16/23   GOALS   PT Goals 2;3   PT Goal 1   Goal Identifier 6MWT   Goal Description Phani will increase distance walked on 6MWT by 10% of initial value without any reports of pain or discomfort in LEs in order to improve activity tolerance to walk dog.   Target Date 10/16/23   PT Goal 2   Goal Identifier Walking tolerance   Goal Description Phani will be able to walk for 20 minutes on 2 separate occasions without any LE discomfort or pain in order to improve activity tolerance for daily activities.   Target Date 10/16/23   PT Goal 3   Goal Identifier HEP   Goal Description Phani will demonstrate compliance to HEP in order to further progress activity tolerance independently at home.   Target Date 10/16/23   Subjective Report   Subjective Report L hip started hurting a week ago and B arches of feet have been hurting since last visit- began later in the day. Lawn mowing did not bother pt.   Vitals Signs   Heart Rate 72   SpO2 96   Blood Pressure 132/73   Treatment Interventions (PT)   Interventions Gait Training   Therapeutic Procedure/Exercise   Therapeutic  Procedures: strength, endurance, ROM, flexibillity minutes (10895) 3   Ther Proc 1 - Details Edu to perform interval training at home with TM at house, start with 8.0 incline and at 2-2.5 mph with goal of 4/10 symptoms within 3-5 minutes, taking a break until symptoms 1/10 then repeating for 30 min a day.   Skilled Intervention edu on HEP progression   Patient Response/Progress Pt reports understanidng   Ther Proc 1 Edu on foot doming to strengthen/stretch plantar fascia/intrinsics of foot   Gait Training   Gait Training Minutes, includes stair climbing (51934) 38   Gait 1 Interval training   Gait 1 - Details Performed interval training Incline of 8.0 at 2.0mph, fatigue of R calf noted at 5 min, pain at 8.5 minutes mod pain in R calf, L mild fatigue, no pain in arches. Repeated at same speed and incline, with R calf pain 4/10 at 10 minutes, needing seated rest break. Pt shares that symptoms increase after stopping on both trials, decreasing within 3-5 minutes.   Skilled Intervention edu, guarding, skilled progression   Patient Response/Progress tolerated well   Education   Learner/Method Patient   Education Comments results of assessment, HEP, POC   Plan   Home program resisted ankle DF, eversion, inversion with RTB, SLS, toe raises, walking program   Plan for next session TM intervals with TB at hips to increase reisistance, simulating 25# dog pulling or stance on incline board with lateral/forward kicks   Total Session Time   Timed Code Treatment Minutes 41   Total Treatment Time (sum of timed and untimed services) 41

## 2023-12-08 DIAGNOSIS — R35.1 NOCTURIA: ICD-10-CM

## 2023-12-11 RX ORDER — TAMSULOSIN HYDROCHLORIDE 0.4 MG/1
CAPSULE ORAL
Qty: 90 CAPSULE | Refills: 1 | Status: SHIPPED | OUTPATIENT
Start: 2023-12-11 | End: 2024-07-03

## 2023-12-12 ENCOUNTER — TRANSFERRED RECORDS (OUTPATIENT)
Dept: HEALTH INFORMATION MANAGEMENT | Facility: CLINIC | Age: 73
End: 2023-12-12
Payer: MEDICARE

## 2024-02-13 ENCOUNTER — TRANSFERRED RECORDS (OUTPATIENT)
Dept: HEALTH INFORMATION MANAGEMENT | Facility: CLINIC | Age: 74
End: 2024-02-13
Payer: MEDICARE

## 2024-02-14 ENCOUNTER — OFFICE VISIT (OUTPATIENT)
Dept: FAMILY MEDICINE | Facility: CLINIC | Age: 74
End: 2024-02-14
Payer: MEDICARE

## 2024-02-14 VITALS
RESPIRATION RATE: 12 BRPM | BODY MASS INDEX: 27.77 KG/M2 | HEART RATE: 74 BPM | WEIGHT: 183.2 LBS | DIASTOLIC BLOOD PRESSURE: 78 MMHG | HEIGHT: 68 IN | TEMPERATURE: 98.7 F | OXYGEN SATURATION: 99 % | SYSTOLIC BLOOD PRESSURE: 138 MMHG

## 2024-02-14 DIAGNOSIS — K14.8 TONGUE MASS: Primary | ICD-10-CM

## 2024-02-14 PROCEDURE — 99214 OFFICE O/P EST MOD 30 MIN: CPT | Performed by: FAMILY MEDICINE

## 2024-02-14 RX ORDER — ROSUVASTATIN CALCIUM 20 MG/1
1 TABLET, COATED ORAL AT BEDTIME
COMMUNITY
Start: 2023-07-03

## 2024-02-14 NOTE — PROGRESS NOTES
"  Assessment & Plan   Problem List Items Addressed This Visit       Tongue mass - Primary     Posterior Left tongue, 3 cm long, 1.3 cm wide. Former smoker. Worried for possible cancer. Will send to ENT for further eval and treatment options.         Relevant Orders    Adult ENT  Referral         BMI  Estimated body mass index is 27.86 kg/m  as calculated from the following:    Height as of this encounter: 1.727 m (5' 8\").    Weight as of this encounter: 83.1 kg (183 lb 3.2 oz).         Subjective   Phani is a 73 year old, presenting for the following health issues:  Tongue Lesion(S) (Growth on Base of Tongue x2 months)        2/14/2024     2:14 PM   Additional Questions   Roomed by RAVINDER Redman   Accompanied by Self         2/14/2024     2:14 PM   Patient Reported Additional Medications   Patient reports taking the following new medications N/A     Patient was having low bit of sore throat over the past week on and off.  He normally gets during the winter months.  He figures that a lot of times when he is congested he agrees to his mouth.  But lately been having sore throat little more than normal.  Have looked in his mouth today and saw a mass on the back of his tongue that is never seen before.  Does not hurt.  He has no difficulty swallowing.  He is never seen something like that before.  Reviewing his history he did smoke from age 15 through about the age of 33.  He has not used any other type of tobacco products.  No history of head and neck cancers in the family.    History of Present Illness       Reason for visit:  Growth base of tongue  Symptom onset:  3-4 weeks ago  Symptoms include:  Growth at base of tongue  Symptom intensity:  Moderate  Symptom progression:  Staying the same  Had these symptoms before:  No    He eats 0-1 servings of fruits and vegetables daily.He consumes 0 sweetened beverage(s) daily.He exercises with enough effort to increase his heart rate 20 to 29 minutes per day.  He exercises " "with enough effort to increase his heart rate 4 days per week.   He is taking medications regularly.         Objective    BP (!) 179/81 (BP Location: Left arm, Patient Position: Sitting, Cuff Size: Adult Large)   Pulse 74   Temp 98.7  F (37.1  C) (Oral)   Resp 12   Ht 1.727 m (5' 8\")   Wt 83.1 kg (183 lb 3.2 oz)   SpO2 99%   BMI 27.86 kg/m    Body mass index is 27.86 kg/m .  Physical Exam  Vitals and nursing note reviewed.   Constitutional:       General: He is not in acute distress.     Appearance: Normal appearance. He is not ill-appearing.   HENT:      Head: Normocephalic and atraumatic.      Right Ear: Tympanic membrane, ear canal and external ear normal.      Left Ear: Tympanic membrane, ear canal and external ear normal.      Nose: Nose normal.      Mouth/Throat:      Mouth: Mucous membranes are moist.      Pharynx: No oropharyngeal exudate or posterior oropharyngeal erythema.      Comments: Back left on tongue has a 3 cm x 1.3 cm mass.  No fluctuation.  Solid in nature.  Eyes:      Extraocular Movements: Extraocular movements intact.      Conjunctiva/sclera: Conjunctivae normal.   Pulmonary:      Effort: Pulmonary effort is normal.   Neurological:      Mental Status: He is alert and oriented to person, place, and time.   Psychiatric:         Attention and Perception: Attention normal.         Mood and Affect: Mood normal.         Speech: Speech normal.         Thought Content: Thought content normal.              Signed Electronically by: Donovan Arreguin,     "

## 2024-02-14 NOTE — ASSESSMENT & PLAN NOTE
Posterior Left tongue, 3 cm long, 1.3 cm wide. Former smoker. Worried for possible cancer. Will send to ENT for further eval and treatment options.

## 2024-02-21 ENCOUNTER — TELEPHONE (OUTPATIENT)
Dept: OTOLARYNGOLOGY | Facility: CLINIC | Age: 74
End: 2024-02-21
Payer: MEDICARE

## 2024-02-22 NOTE — TELEPHONE ENCOUNTER
FUTURE VISIT INFORMATION      FUTURE VISIT INFORMATION:  Date: 2/28/24  Time: 12:30 PM  Location: [facility]  REFERRAL INFORMATION:  Referring provider:    Referring providers clinic:    Reason for visit/diagnosis:  Tongue Mass    Teri Johnson     RECORDS REQUESTED FROM      Clinic name Comments Records Status Imaging Status   [facility] [place]  2/14/24 referral/ OV with Donovan Arreguin DO Epic

## 2024-02-28 ENCOUNTER — OFFICE VISIT (OUTPATIENT)
Dept: OTOLARYNGOLOGY | Facility: CLINIC | Age: 74
End: 2024-02-28
Payer: MEDICARE

## 2024-02-28 ENCOUNTER — PRE VISIT (OUTPATIENT)
Dept: OTOLARYNGOLOGY | Facility: CLINIC | Age: 74
End: 2024-02-28

## 2024-02-28 VITALS — HEIGHT: 68 IN | BODY MASS INDEX: 27.84 KG/M2 | OXYGEN SATURATION: 97 % | WEIGHT: 183.7 LBS

## 2024-02-28 DIAGNOSIS — C01 MALIGNANT NEOPLASM OF BASE OF TONGUE (H): ICD-10-CM

## 2024-02-28 DIAGNOSIS — K14.8 TONGUE MASS: Primary | ICD-10-CM

## 2024-02-28 DIAGNOSIS — F11.20 OPIOID TYPE DEPENDENCE, CONTINUOUS (H): ICD-10-CM

## 2024-02-28 DIAGNOSIS — I73.9 CLAUDICATION (H): ICD-10-CM

## 2024-02-28 PROCEDURE — 88305 TISSUE EXAM BY PATHOLOGIST: CPT | Mod: 26 | Performed by: STUDENT IN AN ORGANIZED HEALTH CARE EDUCATION/TRAINING PROGRAM

## 2024-02-28 PROCEDURE — 88342 IMHCHEM/IMCYTCHM 1ST ANTB: CPT | Mod: TC | Performed by: OTOLARYNGOLOGY

## 2024-02-28 PROCEDURE — 88342 IMHCHEM/IMCYTCHM 1ST ANTB: CPT | Mod: 26 | Performed by: STUDENT IN AN ORGANIZED HEALTH CARE EDUCATION/TRAINING PROGRAM

## 2024-02-28 PROCEDURE — 99204 OFFICE O/P NEW MOD 45 MIN: CPT | Mod: 25 | Performed by: OTOLARYNGOLOGY

## 2024-02-28 PROCEDURE — 31575 DIAGNOSTIC LARYNGOSCOPY: CPT | Performed by: OTOLARYNGOLOGY

## 2024-02-28 PROCEDURE — 42800 BIOPSY OF THROAT: CPT | Performed by: OTOLARYNGOLOGY

## 2024-02-28 ASSESSMENT — PAIN SCALES - GENERAL: PAINLEVEL: MILD PAIN (2)

## 2024-02-28 NOTE — PATIENT INSTRUCTIONS
1. We will call you with pathology results and next steps.   2. Please call the ENT clinic with any questions,concerns, new or worsening symptoms.    -Clinic number is [phone]   - [facility]'s direct line (Dr. Pimentel's nurse) [phone]

## 2024-02-28 NOTE — PROGRESS NOTES
History of present illness: The patient is a 73-year-old male who was recently noted to have a lesion on the left side of his tongue.  The patient reports some pain and discomfort related to this and thinks that has been present for about a month.  He denies any otalgia or tongue numbness.  He does not have a dyne aphasia or dysphagia.  He has not had any hoarseness and has not noticed any lumps or bumps in his neck.        No past medical history on file.  Past Surgical History:   Procedure Laterality Date    HC REMOVAL GALLBLADDER      Description: Cholecystectomy;  Recorded: 2011;       Current Outpatient Medications:     aspirin (ASA) 325 MG tablet, Take 1 tablet by mouth, Disp: , Rfl:     HYDROcodone-acetaminophen (NORCO)  MG per tablet, , Disp: , Rfl:     lisinopril (ZESTRIL) 10 MG tablet, Take 2 tablets (20 mg) by mouth daily, Disp: 90 tablet, Rfl: 1    rosuvastatin (CRESTOR) 20 MG tablet, Take 1 tablet by mouth at bedtime, Disp: , Rfl:     tamsulosin (FLOMAX) 0.4 MG capsule, TAKE 1 CAPSULE(0.4 MG) BY MOUTH DAILY, Disp: 90 capsule, Rfl: 1  Allergies   Allergen Reactions    Other Food Allergy Unknown     Many food intolerances     Social History     Tobacco Use    Smoking status: Former     Packs/day: 1.00     Years: 16.00     Additional pack years: 0.00     Total pack years: 16.00     Types: Cigarettes     Start date: 1966     Quit date: 1984     Years since quittin.1     Passive exposure: Never    Smokeless tobacco: Never   Substance Use Topics    Alcohol use: Not on file     Review Of Systems  Skin: negative  Eyes: negative  Ears/Nose/Throat: negative  Respiratory: No shortness of breath, dyspnea on exertion, cough, or hemoptysis  Cardiovascular: negative  Gastrointestinal: negative  Genitourinary:  Negative  Musculoskeletal: negative  Neurologic: negative  Psychiatric: negative  Hematologic/Lymphatic/Immunologic: negative  Endocrine: negative    Physical examination: The patient is  well-appearing and in no distress.  Examination of the oral cavity reveals the anterior tongue is normal as is the floor mouth, the right buccal mucosa is normal.  On the left side, there is an obvious mass pushing forward into the junctional tongue and tongue base.  It has a large crater in the center with some raised tissue laterally and looks endophytic into the left tonsillar fossa.  The retromolar trigone itself seems spared.  Palpation of the tongue reveals tumor at the circumvallate and even slightly anterior to that.  It does not cross the midline.  He cannot tolerate mirror exam examination of the neck reveals no palpable lymphadenopathy.    Flexible fiberoptic endoscopy through the left nasal cavity reveals normal nasopharynx.  On being the oropharynx there is tumor extending into the tongue base all the way to the lateral aspect of the glossotonsillar sulcus which is also involved.  The larynx and hypopharynx appear normal.  Videos and photo documentation were taken.    Biopsy of the left tongue base: The patient was injected with 2 cc of 1% lidocaine with epinephrine.  I used a small cup forcep to take several samples of the lesion.  There was minimal bleeding which was handled with silver nitrate.    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    Plan:  1.  We will take a biopsy today  2.  Assuming this is positive he will get a PET/CT  3.  We will need to bring him to our tumor board conference but I suspect he will be a candidate for nonsurgical therapy.  We discussed all of these possibilities but also cautioned that we need a lot more information via imaging before we can properly stage him.

## 2024-02-28 NOTE — LETTER
2024       RE: Donovan Yi  [address] [address]     Dear Colleague,    Thank you for referring your patient, Donovan Yi, to the [facility] [place] [facility]. Please see a copy of my visit note below.    History of present illness: The patient is a 73-year-old male who was recently noted to have a lesion on the left side of his tongue.  The patient reports some pain and discomfort related to this and thinks that has been present for about a month.  He denies any otalgia or tongue numbness.  He does not have a dyne aphasia or dysphagia.  He has not had any hoarseness and has not noticed any lumps or bumps in his neck.        No past medical history on file.  Past Surgical History:   Procedure Laterality Date    HC REMOVAL GALLBLADDER      Description: Cholecystectomy;  Recorded: 2011;       Current Outpatient Medications:     aspirin (ASA) 325 MG tablet, Take 1 tablet by mouth, Disp: , Rfl:     HYDROcodone-acetaminophen (NORCO)  MG per tablet, , Disp: , Rfl:     lisinopril (ZESTRIL) 10 MG tablet, Take 2 tablets (20 mg) by mouth daily, Disp: 90 tablet, Rfl: 1    rosuvastatin (CRESTOR) 20 MG tablet, Take 1 tablet by mouth at bedtime, Disp: , Rfl:     tamsulosin (FLOMAX) 0.4 MG capsule, TAKE 1 CAPSULE(0.4 MG) BY MOUTH DAILY, Disp: 90 capsule, Rfl: 1  Allergies   Allergen Reactions    Other Food Allergy Unknown     Many food intolerances     Social History     Tobacco Use    Smoking status: Former     Packs/day: 1.00     Years: 16.00     Additional pack years: 0.00     Total pack years: 16.00     Types: Cigarettes     Start date: 1966     Quit date: 1984     Years since quittin.1     Passive exposure: Never    Smokeless tobacco: Never   Substance Use Topics    Alcohol use: Not on file     Review Of Systems  Skin: negative  Eyes: negative  Ears/Nose/Throat:  negative  Respiratory: No shortness of breath, dyspnea on exertion, cough, or hemoptysis  Cardiovascular: negative  Gastrointestinal: negative  Genitourinary:  Negative  Musculoskeletal: negative  Neurologic: negative  Psychiatric: negative  Hematologic/Lymphatic/Immunologic: negative  Endocrine: negative    Physical examination: The patient is well-appearing and in no distress.  Examination of the oral cavity reveals the anterior tongue is normal as is the floor mouth, the right buccal mucosa is normal.  On the left side, there is an obvious mass pushing forward into the junctional tongue and tongue base.  It has a large crater in the center with some raised tissue laterally and looks endophytic into the left tonsillar fossa.  The retromolar trigone itself seems spared.  Palpation of the tongue reveals tumor at the circumvallate and even slightly anterior to that.  It does not cross the midline.  He cannot tolerate mirror exam examination of the neck reveals no palpable lymphadenopathy.    Flexible fiberoptic endoscopy through the left nasal cavity reveals normal nasopharynx.  On being the oropharynx there is tumor extending into the tongue base all the way to the lateral aspect of the glossotonsillar sulcus which is also involved.  The larynx and hypopharynx appear normal.  Videos and photo documentation were taken.    Biopsy of the left tongue base: The patient was injected with 2 cc of 1% lidocaine with epinephrine.  I used a small cup forcep to take several samples of the lesion.  There was minimal bleeding which was handled with silver nitrate.    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    Plan:  1.  We will take a biopsy today  2.  Assuming this is positive he will get a PET/CT  3.  We will need to bring him to our tumor board conference but I suspect he will be a candidate for nonsurgical therapy.  We discussed all of these possibilities but also cautioned that we  need a lot more information via imaging before we can properly stage him.          Again, thank you for allowing me to participate in the care of your patient.      Sincerely,    Albert Pimentel MD

## 2024-03-01 DIAGNOSIS — C01 MALIGNANT NEOPLASM OF BASE OF TONGUE (H): Primary | ICD-10-CM

## 2024-03-01 NOTE — PROGRESS NOTES
Received a call from patient's wife indicating that patient has some oral bleeding last evening. They were able to get this to stop with some pressure and he has not had any bleeding since. Discussed soft diet and advised patient and wife that if they are unable to control the bleeding again, they should proceed to the ER.  Wife verbalized understanding.     Discussed with wife that pathology results are still pending, but we do have patient scheduled for PET scan on Wednesday 3/6 at [facility]. Advised wife that we will adjust this if we do not have results back by then or if results are not indicative of cancer. Patient and wife agreeable to this plan. We will call when results are confirmed.     Patient and wife encouraged to call with further questions or concerns.       Laury Walters, RN, BSN

## 2024-03-04 ENCOUNTER — PATIENT OUTREACH (OUTPATIENT)
Dept: OTOLARYNGOLOGY | Facility: CLINIC | Age: 74
End: 2024-03-04
Payer: MEDICARE

## 2024-03-04 LAB
PATH REPORT.COMMENTS IMP SPEC: ABNORMAL
PATH REPORT.COMMENTS IMP SPEC: YES
PATH REPORT.FINAL DX SPEC: ABNORMAL
PATH REPORT.GROSS SPEC: ABNORMAL
PATH REPORT.MICROSCOPIC SPEC OTHER STN: ABNORMAL
PATH REPORT.RELEVANT HX SPEC: ABNORMAL
PHOTO IMAGE: ABNORMAL

## 2024-03-04 NOTE — TELEPHONE ENCOUNTER
Called and spoke with patient regarding the following pathology results:    Final Diagnosis   A. OROPHARYNX, LEFT BASE OF TONGUE, BIOPSY:  - P16-POSITIVE SQUAMOUS CELL CARCINOMA       Reviewed with patient that next step would be to obtain PET scan to further evaluate and plan for treatment. Patient is scheduled for PET scan on 3/6. Advised that we will evaluate his results and plan for treatment on Friday and our tumor board and connect with him with this information.     Advised patient he can proceed with radiation clearance with his dentist to try and expedite his treatment planning process. Dental information sent to patient via [employer].     Patient verbalized understanding and will call with further questions or concerns.       Laury Walters, RN, BSN

## 2024-03-06 ENCOUNTER — HOSPITAL ENCOUNTER (OUTPATIENT)
Dept: PET IMAGING | Facility: HOSPITAL | Age: 74
Discharge: HOME | End: 2024-03-06
Attending: OTOLARYNGOLOGY
Payer: MEDICARE

## 2024-03-06 DIAGNOSIS — C01 MALIGNANT NEOPLASM OF BASE OF TONGUE (H): ICD-10-CM

## 2024-03-06 LAB
CREAT BLD-MCNC: 1 MG/DL (ref 0.7–1.3)
EGFRCR SERPLBLD CKD-EPI 2021: >60 ML/MIN/1.73M2
GLUCOSE BLDC GLUCOMTR-MCNC: 110 MG/DL (ref 70–99)

## 2024-03-06 PROCEDURE — A9552 F18 FDG: HCPCS | Performed by: OTOLARYNGOLOGY

## 2024-03-06 PROCEDURE — 70491 CT SOFT TISSUE NECK W/DYE: CPT

## 2024-03-06 PROCEDURE — 71260 CT THORAX DX C+: CPT

## 2024-03-06 PROCEDURE — 82565 ASSAY OF CREATININE: CPT

## 2024-03-06 PROCEDURE — 78816 PET IMAGE W/CT FULL BODY: CPT | Mod: MG,PI

## 2024-03-06 PROCEDURE — 250N000011 HC RX IP 250 OP 636: Performed by: OTOLARYNGOLOGY

## 2024-03-06 PROCEDURE — 82962 GLUCOSE BLOOD TEST: CPT

## 2024-03-06 PROCEDURE — 343N000001 HC RX 343: Performed by: OTOLARYNGOLOGY

## 2024-03-06 RX ORDER — IOPAMIDOL 755 MG/ML
90 INJECTION, SOLUTION INTRAVASCULAR ONCE
Status: COMPLETED | OUTPATIENT
Start: 2024-03-06 | End: 2024-03-06

## 2024-03-06 RX ADMIN — IOPAMIDOL 90 ML: 755 INJECTION, SOLUTION INTRAVENOUS at 10:48

## 2024-03-06 RX ADMIN — FLUDEOXYGLUCOSE F-18 10.9 MILLICURIE: 500 INJECTION, SOLUTION INTRAVENOUS at 09:48

## 2024-03-07 ENCOUNTER — PATIENT OUTREACH (OUTPATIENT)
Dept: OTOLARYNGOLOGY | Facility: CLINIC | Age: 74
End: 2024-03-07
Payer: MEDICARE

## 2024-03-07 NOTE — TELEPHONE ENCOUNTER
Called and left message for patient to return call to discuss PET scan results. Left direct line.     Writer called and reviewed the following PET scan results with patient's wife:    IMPRESSION:     1.  Hypermetabolic left base of tongue mass extending inferiorly into the vallecula, compatible with biopsy-proven squamous cell carcinoma. No evidence of FDG avid metastatic disease.  2.  Right upper lobe groundglass nodules/opacities with minimal FDG uptake. These may represent infectious foci versus groundglass nodules/adenocarcinoma spectrum lesions. Recommend attention on future oncologic imaging. Additional small solid pulmonary   nodules are below the resolution of PET and can be followed on future imaging.      Reviewed with patient's wife that we will proceed with review at our tumor board tomorrow and will update them on treatment plan. Patients wife appreciative of call and was encouraged to call with further questions or concerns.       Laury Walters, RN, BSN

## 2024-03-07 NOTE — TUMOR CONFERENCE
Head & Neck Tumor Conference Note        Status: New  Staff: Dr. Pimentel    Tumor Site: Left Base of Tongue  Tumor Pathology: p16+ SCC  Tumor Stage: T3N0  Tumor Treatment: None    Reason for Review: Review imaging and POC    Brief History: This is a 73 year old male who was recently noted to have a lesion on the left side of his tongue after presenting to his family medicine doctor with intermittent sore throat for the previous week. Additionally, the patient reports some pain and discomfort related to the mass, and thinks that it has been present for about a month.  Patient denies any otalgia or tongue numbness, initially does not report any odynophagia or dysphagia.  No reports of hoarseness.  Patient has not noticed any lumps in his neck.  Physical exam revealed exophytic mass just anterior to the circumvallate papillae, which extends to the lateral aspect of the glossotonsillar sulcus.  In office biopsy of the left tongue base disclosed p16 positive squamous cell carcinoma.    Pertinent PMH: No past medical history on file.     Smoking Hx:   Social History     Tobacco Use    Smoking status: Former     Packs/day: 1.00     Years: 16.00     Additional pack years: 0.00     Total pack years: 16.00     Types: Cigarettes     Start date: 1966     Quit date: 1984     Years since quittin.2     Passive exposure: Never    Smokeless tobacco: Never   Vaping Use    Vaping Use: Never used       Imaging:   Results for orders placed during the hospital encounter of 24    PET Oncology Whole Body    Narrative  EXAM: PET ONCOLOGY WHOLE BODY, CT SOFT TISSUE NECK W CONTRAST, CT CHEST/ABDOMEN/PELVIS W CONTRAST  LOCATION: [facility]  DATE: 3/6/2024    INDICATION: Initial treatment planning and staging for malignant neoplasm of base of tongue. Tongue base squamous cell carcinoma.  COMPARISON: No relevant prior comparison imaging.  CONTRAST: 90 mL Isovue-370.  TECHNIQUE: Serum glucose level  110 mg/dL. One hour post intravenous administration of 10.9 mCi F-18 FDG, PET imaging was performed from the skull vertex to feet, utilizing attenuation correction with concurrent axial CT and PET/CT image fusion. Separate  diagnostic CT of the neck, chest, abdomen, and pelvis was performed. Dose reduction techniques were used.    LIMITATIONS: Evaluation of the calvarium is limited by patient motion artifact.    PET/CT FINDINGS: Hypermetabolic left base of tongue mass which extends inferiorly into the vallecula compatible with biopsy-proven squamous cell carcinoma. This mass measures up to 2.7 x 2.6 x 4.4 cm with SUV max of 23.8. No evidence of FDG avid latricia or  distant metastatic disease.    A right upper lobe some solid opacity measures up to 4.2 cm and demonstrates minimal FDG uptake with SUV max of 1.4. There are additional adjacent smaller groundglass nodule/opacities.    CT FINDINGS: No cervical adenopathy. Mild coronary artery calcifications. Nodular opacity in the right upper lobe apex without FDG avidity is favored scarring (series 7, image 16). Right middle lobe 3 mm subpleural nodule (series 7, image 76) and right  lower lobe 3 mm subpleural nodule (series 7, image 79), below the resolution of PET. Cholecystectomy. Mild intrahepatic and extra hepatic biliary ductal dilatation is likely secondary to postcholecystectomy reservoir state. Lobulated contour of both  kidneys. Few colonic diverticula. Small duodenal diverticulum. Enlargement of the median lobe of the prostate gland with mass effect on the posterior bladder. Circumferential bladder wall thickening is nonspecific but may be secondary to chronic outlet  obstruction. Small fat-containing umbilical hernia. Degenerative changes of the spine.    Impression  IMPRESSION:    1.  Hypermetabolic left base of tongue mass extending inferiorly into the vallecula, compatible with biopsy-proven squamous cell carcinoma. No evidence of FDG avid metastatic  disease.  2.  Right upper lobe groundglass nodules/opacities with minimal FDG uptake. These may represent infectious foci versus groundglass nodules/adenocarcinoma spectrum lesions. Recommend attention on future oncologic imaging. Additional small solid pulmonary  nodules are below the resolution of PET and can be followed on future imaging.    No results found for this or any previous visit.      Results for orders placed during the hospital encounter of 03/06/24    CT Soft Tissue Neck w Contrast    Narrative  EXAM: PET ONCOLOGY WHOLE BODY, CT SOFT TISSUE NECK W CONTRAST, CT CHEST/ABDOMEN/PELVIS W CONTRAST  LOCATION: [facility]  DATE: 3/6/2024    INDICATION: Initial treatment planning and staging for malignant neoplasm of base of tongue. Tongue base squamous cell carcinoma.  COMPARISON: No relevant prior comparison imaging.  CONTRAST: 90 mL Isovue-370.  TECHNIQUE: Serum glucose level 110 mg/dL. One hour post intravenous administration of 10.9 mCi F-18 FDG, PET imaging was performed from the skull vertex to feet, utilizing attenuation correction with concurrent axial CT and PET/CT image fusion. Separate  diagnostic CT of the neck, chest, abdomen, and pelvis was performed. Dose reduction techniques were used.    LIMITATIONS: Evaluation of the calvarium is limited by patient motion artifact.    PET/CT FINDINGS: Hypermetabolic left base of tongue mass which extends inferiorly into the vallecula compatible with biopsy-proven squamous cell carcinoma. This mass measures up to 2.7 x 2.6 x 4.4 cm with SUV max of 23.8. No evidence of FDG avid latricia or  distant metastatic disease.    A right upper lobe some solid opacity measures up to 4.2 cm and demonstrates minimal FDG uptake with SUV max of 1.4. There are additional adjacent smaller groundglass nodule/opacities.    CT FINDINGS: No cervical adenopathy. Mild coronary artery calcifications. Nodular opacity in the right upper lobe apex without FDG  avidity is favored scarring (series 7, image 16). Right middle lobe 3 mm subpleural nodule (series 7, image 76) and right  lower lobe 3 mm subpleural nodule (series 7, image 79), below the resolution of PET. Cholecystectomy. Mild intrahepatic and extra hepatic biliary ductal dilatation is likely secondary to postcholecystectomy reservoir state. Lobulated contour of both  kidneys. Few colonic diverticula. Small duodenal diverticulum. Enlargement of the median lobe of the prostate gland with mass effect on the posterior bladder. Circumferential bladder wall thickening is nonspecific but may be secondary to chronic outlet  obstruction. Small fat-containing umbilical hernia. Degenerative changes of the spine.    Impression  IMPRESSION:    1.  Hypermetabolic left base of tongue mass extending inferiorly into the vallecula, compatible with biopsy-proven squamous cell carcinoma. No evidence of FDG avid metastatic disease.  2.  Right upper lobe groundglass nodules/opacities with minimal FDG uptake. These may represent infectious foci versus groundglass nodules/adenocarcinoma spectrum lesions. Recommend attention on future oncologic imaging. Additional small solid pulmonary  nodules are below the resolution of PET and can be followed on future imaging.        Pathology:     Final Diagnosis   A. OROPHARYNX, LEFT BASE OF TONGUE, BIOPSY:  - P16-POSITIVE SQUAMOUS CELL CARCINOMA   Electronically signed by Lori Arriaga MD on 3/4/2024 at  9:10 AM       Tumor Board Recommendation:   Discussion:   Imaging was reviewed regarding tumor of the left base of tongue. PET/CT demonstrates tongue base as only area of abnormality, with involvement very close to the midline, extending anteriorly to the oral tongue. Imaging shows extensive craniocaudal extension. Clinic fiberoptic laryngoscope exam was provided, which demonstrates left-sided tumor freely extending posteriorly, but not invading the epiglottis. Staging chest CT showed  right upper lobe ground-glass opacities, review of imaging agreed with read that lesions did not appear metastatic. Given size of primary tumor, discussions leaning towards non-surgical therapy with chemoradiation.     Plan:   - Recommend chemoradiation    Luis Moraes MD  Otolaryngology- Head and Neck Surgery    Documentation / Disclaimer Cancer Tumor Board Note  Cancer tumor board recommendations do not override what is determined to be reasonable care and treatment, which is dependent on the circumstances of a patient's case; the patient's medical, social, and personal concerns; and the clinical judgment of the oncologist [physician].

## 2024-03-08 ENCOUNTER — PATIENT OUTREACH (OUTPATIENT)
Dept: ONCOLOGY | Facility: CLINIC | Age: 74
End: 2024-03-08

## 2024-03-08 ENCOUNTER — TUMOR CONFERENCE (OUTPATIENT)
Dept: ONCOLOGY | Facility: CLINIC | Age: 74
End: 2024-03-08
Payer: MEDICARE

## 2024-03-08 DIAGNOSIS — C01 PRIMARY SQUAMOUS CELL CARCINOMA OF BASE OF TONGUE (H): Primary | ICD-10-CM

## 2024-03-08 NOTE — TUMOR CONFERENCE
Called patient to review recommendations from tumor conference. Plan for patient to move forward with chemo and radiation therapy. Referrals placed. Patient verbalized understanding. Patient has seen dentist. Patient will call clinic with further questions or concerns.    Elizabeth TAN, RN     [FreeTextEntry1] : 62 yo here for sx of pelvic cramping and urgency and frequency. SHe hasn’t had sex in a yr and hsnt had etoh in 3 yrs.

## 2024-03-08 NOTE — PROGRESS NOTES
New Patient Oncology Nurse Navigator Note     Referring provider: [facility]iment[facility] [place] [facility]     Referred to (specialty): Radiation Oncology    Requested provider (if applicable): n/a     Date Referral Received: 3/8/2024     Evaluation for: SCC of left tongue base  Dental:  Per ENT:  Patient has seen dentist    Clinical History (per Nurse review of records provided):    **BOOK MARKED**   NOTES:  3/8/2024:  ENT tumor conference discussion  2/28/2024:  ENT Consult:  Dr. Pimentel    IMAGING:  3/6/2024:  CT c/a/p, CT Soft Tissue Neck and PET    PATHOLOGY:  2/28/2024:    Final Diagnosis   A. OROPHARYNX, LEFT BASE OF TONGUE, BIOPSY:  - P16-POSITIVE SQUAMOUS CELL CARCINOMA   Electronically signed by Lori Arriaga MD on 3/4/2024 at  9:10 AM       Clinical Assessment / Barriers to Care (Per Nurse): none noted       Records Location (Care Everywhere, Media, etc.): EPIC     Records Needed:   Any Radiation previously?  NO    Additional testing needed prior to consult: none

## 2024-03-08 NOTE — PROGRESS NOTES
I called and spoke to patient at length.  I first explained my role and purpose of my call.  We discussed his need for chemo and radiation oncology consults.  I told him he can discuss all of his concerns and options with the oncologists.  He has a big concern regarding his diet and GI issues he already has.  I told him I would give the MD's a head's up so we can be sure he gets connected with the dietician and SLP (Speech language pathologists).  He appreciated all my time and going over all of his questions.    IB message to Oncologists sent.

## 2024-03-09 NOTE — TELEPHONE ENCOUNTER
MEDICAL RECORDS REQUEST   Radiation Oncology  [address] [place] [address]  Fax: [phone]          FUTURE VISIT INFORMATION                                                   Donovan Yi, : 1950 scheduled for future visit at [facility] Radiation Oncology    RECORDS REQUESTED FOR VISIT                                                     HEAD & NECK     OFFICE NOTE from PCP Epic 23: Dr. Heidi Chairez   OFFICE NOTE from ENT [facility] 24: Dr. Albert Pimentel   MEDICATION LIST [facility]    LABS     PATHOLOGY REPORTS Report in EPIC 24: [id number]   ANYTHING RELATED TO DIAGNOSIS Epic Most recent 24   IMAGING (NEED IMAGES & REPORT)     CT SCANS PACS 24: CT Soft Tissue Neck  24: CT CAP    Ray[facility]:  17: CT Sinus   PET PACS 24: PET Onc Whole Body

## 2024-03-09 NOTE — TELEPHONE ENCOUNTER
RECORDS STATUS - ALL OTHER DIAGNOSIS      RECORDS RECEIVED FROM: Epic   DATE RECEIVED:    NOTES STATUS DETAILS   OFFICE NOTE from referring provider Epic 02/28/24: Dr. Albert Pimentel   OFFICE NOTE from radiation oncologist Epic 03/15/24: Dr. Joyce Yang   MEDICATION LIST [facility]    LABS     PATHOLOGY REPORTS Report in Epic 02/28/24: [id number]   ANYTHING RELATED TO DIAGNOSIS Epic Most recent 03/06/24   IMAGING (NEED IMAGES & REPORT)     CT SCANS PACS 03/06/24: CT Soft Tissue Neck  03/06/24: CT CAP    [facility]:  02/16/17: CT Sinus   PET Epic 03/06/24: PET Onc Whole Body

## 2024-03-11 ENCOUNTER — TELEPHONE (OUTPATIENT)
Dept: SPEECH THERAPY | Facility: CLINIC | Age: 74
End: 2024-03-11
Payer: MEDICARE

## 2024-03-11 DIAGNOSIS — R13.10 DYSPHAGIA: Primary | ICD-10-CM

## 2024-03-15 ENCOUNTER — OFFICE VISIT (OUTPATIENT)
Dept: RADIATION ONCOLOGY | Facility: HOSPITAL | Age: 74
End: 2024-03-15
Attending: OTOLARYNGOLOGY
Payer: MEDICARE

## 2024-03-15 ENCOUNTER — PRE VISIT (OUTPATIENT)
Dept: RADIATION ONCOLOGY | Facility: HOSPITAL | Age: 74
End: 2024-03-15
Payer: MEDICARE

## 2024-03-15 VITALS
WEIGHT: 182.9 LBS | HEIGHT: 70 IN | HEART RATE: 66 BPM | DIASTOLIC BLOOD PRESSURE: 68 MMHG | RESPIRATION RATE: 16 BRPM | SYSTOLIC BLOOD PRESSURE: 151 MMHG | OXYGEN SATURATION: 98 % | BODY MASS INDEX: 26.18 KG/M2

## 2024-03-15 DIAGNOSIS — C01 MALIGNANT NEOPLASM OF BASE OF TONGUE (H): Primary | ICD-10-CM

## 2024-03-15 DIAGNOSIS — C01 PRIMARY SQUAMOUS CELL CARCINOMA OF BASE OF TONGUE (H): ICD-10-CM

## 2024-03-15 PROCEDURE — 77263 THER RADIOLOGY TX PLNG CPLX: CPT | Performed by: RADIOLOGY

## 2024-03-15 PROCEDURE — 77470 SPECIAL RADIATION TREATMENT: CPT | Mod: 26 | Performed by: RADIOLOGY

## 2024-03-15 PROCEDURE — G0463 HOSPITAL OUTPT CLINIC VISIT: HCPCS | Performed by: RADIOLOGY

## 2024-03-15 PROCEDURE — 99205 OFFICE O/P NEW HI 60 MIN: CPT | Mod: 25 | Performed by: RADIOLOGY

## 2024-03-15 PROCEDURE — 77470 SPECIAL RADIATION TREATMENT: CPT | Performed by: RADIOLOGY

## 2024-03-15 RX ORDER — CYCLOBENZAPRINE HCL 10 MG
10 TABLET ORAL 3 TIMES DAILY PRN
COMMUNITY
Start: 2024-03-12

## 2024-03-15 NOTE — PROGRESS NOTES
"Oncology Rooming Note    March 15, 2024 1:48 PM   Donovan Yi is a 73 year old male who presents for:    Chief Complaint   Patient presents with    Oncology Clinic Visit    Head And Neck Cancer     Rad Onc consult     Initial Vitals: BP (!) 151/68   Pulse 66   Resp 16   Ht 1.778 m (5' 10\")   Wt 83 kg (182 lb 14.4 oz)   SpO2 98%   BMI 26.24 kg/m   Estimated body mass index is 26.24 kg/m  as calculated from the following:    Height as of this encounter: 1.778 m (5' 10\").    Weight as of this encounter: 83 kg (182 lb 14.4 oz). Body surface area is 2.02 meters squared.  No Pain (0) Comment: Data Unavailable   No LMP for male patient.  Allergies reviewed: Yes  Medications reviewed: Yes    Medications: Medication refills not needed today.  Pharmacy name entered into [facility]: [id number] [place] [id number] [address] [facility]    Clinical concerns: Here for Rad Onc consult for H&N Cancer. States he feels a lump at base of tongue, doesn't like laying on his back d/t tumor positioning.   Dr. Yang was notified.    Radiation Therapy Patient Education    Person involved with teaching: Patient and Wife    Patient educational needs for self management of treatment-related side effects assessment completed.  [facility] Patient Ed tab contains Patient Learning Assessment    Education Materials Given  Radiation Treatment For Cancer, Oral Care Guidelines, Radiation Therapy to the Head & Neck Guidelines, Welcome Letter, Insurance PA Information, Map [facility], and [facility] pamphlet.     Educational Topics Discussed  Side effects expected, Pain management, Activity, and Nutrition and weight loss    Response To Teaching  Verbalizes understanding    Referrals sent: Nutrition    Chemotherapy?  Yes: Consult with Dr. Aldana upcoming, 3/18/2024    No prior RT  No ICD  Had one dental extraction yesterday and otherwise cleared by Dental per pt  SLP & swallow study 3/28/2024  May want tx " at [facility], told pt to decide before his SIM appt.     Laura Hill RN

## 2024-03-15 NOTE — LETTER
3/15/2024         RE: Donovan Yi  [address] [address]        Dear Colleague,    Thank you for referring your patient, Donovan Yi, to the [facility] RADIATION ONCOLOGY [place]. Please see a copy of my visit note below.    [facility] Radiation Oncology Consult Note     Patient: Donovan Yi  MRN: [id number]  Date of Service: 03/15/2024          Albert Pimentel MD  [address] [address]       Dear Dr. Pimentel:    Thank you very much for referring this patient for consideration of radiotherapy. As you know Mr. 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  The case has been reviewed at ENT tumor conference and that the consensus recommendation is to consider concurrent chemoradiation therapy. The patient is referred to radiation oncology for evaluation and consideration of definitive radiation therapy.    HISTORY OF PRESENT ILLNESS:   Mr. Yi is a 73 year old male who is a former smoker 1 pack a day for 16 years and quit smoking since 1984.  Patient presented with recent history of some pain and discomfort related to this and thinks that has been present for about a month. He denies any otalgia or tongue numbness.  He was found to have a lesion on the left side of the tongue.  He does not have a dyne aphasia or dysphagia. He has not had any hoarseness and has not noticed any lumps or bumps in his neck.  ENT examination revealed an obvious mass pushing forward into the junctional tongue and tongue base. It has a large crater in the center with some raised tissue laterally and looks endophytic into the left tonsillar fossa.  There is no palpable lymphadenopathy.  Biopsy was obtained on 2/28/2024 with pathology showed  p16 positive squamous cell carcinoma.  Patient had a staging PET CT scan on 3/6/2024 which showed FDG avid 2.7 x 2.6 x 4.4 cm mass in the left base of tongue extending inferiorly into the vallecula compatible with biopsy-proven squamous cell carcinoma.  There is no evidence of lymph nodes and systemic metastasis.  The case has been reviewed on ENT tumor conference of [facility].  The tumor was staged T3 N0 M0 and the consensus recommendation is to consider concurrent chemoradiation therapy.  He is referred to radiation oncology for evaluation and consideration of definitive radiation therapy.  Patient had a 25 years history of not able to tolerate most of food which caused abdominal pain and diarrhea.  He is only able to get pancakes down without any significant difficulty.  Patient has been seen numerous GI specialist without any significant improvement.    CHEMOTHERAPY HISTORY: Concurrent Chemotherapy: No    RADIATION THERAPY HISTORY: Prior Radiation: No    IMPLANTED CARDIAC DEVICE: none     Current Outpatient Medications   Medication Sig Dispense Refill     cyclobenzaprine (FLEXERIL) 10 MG tablet Take 10 mg by mouth 3 times daily as needed       Multiple Vitamins-Minerals (ONE-A-DAY 50 PLUS PO) Take 1 tablet by mouth daily       Probiotic Product (PROBIOTIC BLEND PO) Take 2 capsules by mouth daily Prebiotic and Probiotic blend       aspirin (ASA) 325 MG tablet Take 1 tablet by mouth       HYDROcodone-acetaminophen (NORCO)  MG per tablet        lisinopril (ZESTRIL) 10 MG tablet Take 2 tablets (20 mg) by mouth daily 90 tablet 1     rosuvastatin (CRESTOR) 20 MG tablet Take 1 tablet by mouth at bedtime       tamsulosin (FLOMAX) 0.4 MG capsule TAKE 1 CAPSULE(0.4 MG) BY MOUTH DAILY 90 capsule 1     Past Medical History:   Diagnosis Date     Food intolerance in adult      Hypertension      Malignant neoplasm of base of tongue (H)      Past Surgical History:   Procedure Laterality Date     HC REMOVAL  GALLBLADDER      Description: Cholecystectomy;  Recorded: 2011;     Allergies   Allergen Reactions     Other Food Allergy Unknown     Many food intolerances     No family history on file.  Social History     Socioeconomic History     Marital status:      Spouse name: Not on file     Number of children: Not on file     Years of education: Not on file     Highest education level: Not on file   Occupational History     Not on file   Tobacco Use     Smoking status: Former     Packs/day: 1.00     Years: 16.00     Additional pack years: 0.00     Total pack years: 16.00     Types: Cigarettes     Start date: 1966     Quit date: 1984     Years since quittin.2     Passive exposure: Never     Smokeless tobacco: Never   Vaping Use     Vaping Use: Never used   Substance and Sexual Activity     Alcohol use: Not on file     Drug use: Not on file     Sexual activity: Not on file   Other Topics Concern     Not on file   Social History Narrative     Not on file     Social Determinants of Health     Financial Resource Strain: Not on file   Food Insecurity: Not on file   Transportation Needs: Not on file   Physical Activity: Not on file   Stress: Not on file   Social Connections: Not on file   Interpersonal Safety: Low Risk  (2024)    Interpersonal Safety      Do you feel physically and emotionally safe where you currently live?: Yes      Within the past 12 months, have you been hit, slapped, kicked or otherwise physically hurt by someone?: No      Within the past 12 months, have you been humiliated or emotionally abused in other ways by your partner or ex-partner?: No   Housing Stability: Not on file        REVIEW OF SYMPTOMS:  A full 14-point review of systems was performed. Pertinent findings are noted in the HPI.    General  Constitutional  Constitutional (WDL): Exceptions to WDL  Fatigue: Fatigue relieved by rest (insomnia)  EENT  Eye Disorders  Eye Disorder (WDL): All eye disorder elements are  "within defined limits  Ear Disorders  Ear Disorder (WDL): All ear disorder elements are within defined limits  Respiratory  Respiratory  Respiratory (WDL): Exceptions to WDL  Cough: Mild symptoms OR nonprescription intervention indicated  Dyspnea: Shortness of breath with moderate exertion  Cardiovascular  Cardiovascular  Cardiovascular (WDL): All cardiovascular elements are within defined limits  Gastrointestinal  Gastrointestinal  Gastrointestinal (WDL): Exceptions to WDL (has severe food intolerance, only eats pancakes)  Diarrhea: Increase of less than 4 stools per day over baseline OR mild increase in ostomy output compared to baseline  Musculoskeletal  Musculoskeletal and Connective Tissue Disorders  Musculoskeletal & Connective (WDL): All musculoskeletal & connective elements are within defined limits  Integumentary  Integumentary  Integumentary (WDL): All integumentary elements are within defined limits  Neurological  Neurosensory  Neurosensory (WDL): All neurosensory elements are within defined limits  Genitourinary/Reproductive  Genitourinary  Genitourinary (WDL): Exceptions to WDL  Urinary Frequency: Present  Lymphatic  Lymph System Disorders  Lymph (WDL): All lymph elements are within defined limits  Pain  Pain Score: No Pain (0)  AUA Assessment                                                              Accompanied by  Accompanied By: spouse    ECOG Status: 1    Imaging: Reviewed    Pathology: Reviewed    Objective:     PHYSICAL EXAMINATION:    BP (!) 151/68   Pulse 66   Resp 16   Ht 1.778 m (5' 10\")   Wt 83 kg (182 lb 14.4 oz)   SpO2 98%   BMI 26.24 kg/m      Gen: Alert, in NAD  Eyes: PERRL, EOMI, sclera anicteric  HENT     Head: NC/AT     Ears: No external auricular lesions     Nose/sinus: No rhinorrhea or epistaxis     Oropharynx: MMM, no visible oral lesions  Neck: Supple, full ROM, no LAD  Pulm: No wheezing, stridor or respiratory distress  CV: Well-perfused, no cyanosis, no pedal " edema  Back: No step-offs or pain to palpation along the thoracolumbar spine  Rectal: Deferred  : Deferred  Musculoskeletal: Normal muscle bulk and tone  Skin: Normal color and turgor  Neurologic: A/Ox3, CN II-XII intact, normal gait and station  Psychiatric: Appropriate mood and affect    Intent of Therapy: Curative  Side effects that may occur during or within weeks after Radiation Therapy    Fatigue and general weakness  Loss of hair on the face and neck  Pain in the irradiated limb  Darkening, irritation, itchiness, redness, dryness,and peeling, scabbing and ulceration of the skin on the neck and face  Mouth and throat dryness, irritation and swallowing difficulties and infection  Thickening of the saliva  Change in or loss of taste sensation  Decrease in appetite  Hoarseness and change in voice    Side effects that may occur months or years after Radiation Therapy    Development of another tumor or cancer  Thickening, telangiectasias (development of spider like blood vessels in the skin) and ulceration of the skin of the face and neck  Fibrosis (scar tissue), decreased flexibility and swelling of the neck  Brain inflammation or necrosis that may cause various neurologic symptoms  Decrease in memory and thinking abilities  Poor healing after a trauma or surgery in the irradiated area  Facial numbness, pain and weakness  Nerve damage resulting in loss of strength and sensation  Tooth and jaw decay and poor healing after dental procedures    The risks, benefits and alternatives to radiation therapy were outlined with the patient. All questions were answered and a consent was signed.     Impression     Squamous cell carcinoma involving left base of tongue, clinical stage T3 N0 M0, p16 positive, staging workup including PET/CT scan showed no evidence of lymph nodes and systemic metastasis.  The case has been reviewed at ENT tumor conference and that the consensus recommendation is to consider concurrent  chemoradiation therapy.      Assessment & Plan:     I have personally reviewed his upcoming medical record today.  I have also reviewed his recent radiology study including CT scan and PET scan.  This is a 73-year-old gentleman with a recent diagnosis of squamous cell carcinoma of the left base of tongue with no evidence of lymph nodes and systemic metastasis. The possible treatment options for the head and neck cancer have been discussed with the patient including surgery, chemotherapy, and radiation therapy.  The possible risks and the side effects of radiation therapy have also been explained to the patient in detail and at a great length. I agree with you, the patient is a good candidate for concurrent chemoradiation therapy.  I think the patient can be potentially benefited by the combined chemoradiation therapy for local control and possibly a better survival.  I have explained to the patient that combined chemoradiation therapy is a quite intensive protocol for the head and neck cancer.  The patient often will require extensive support during and after the planned therapy.  The patient may also require short-term hospital stay toward the end of her therapy.  Majority of our patients will have a difficulty of swallowing during the course of the radiation therapy.  Most of our patient will get back to placement for nutritional support prior chemoradiation therapy.  The patient had a history of difficulty of tolerating any food intake and I think the PEG tube may not be helpful in his situation.  It may be reasonable to consider TPN through central line.    The radiation simulation, treatment planning, image guidance, and treatment delivery processes from my handout were reviewed. The acute effects of radiation were reviewed including fatigue, skin burn, soreness of the throat that may be severe enough to require narcotics for pain control, hair loss that may be permanent, loss of taste, dry mouth that may be  permanent, dehydration or weight loss that may require intravenous fluids, hospitalization, or feeding tube, plugging of the ear due to fluid buildup in the middle ear that may require tympanostomy tube placement. Possible late effects of radiation were discussed including long-term difficulty swallowing, swelling or scarring of the neck tissues that may cause stiffness of the neck or jaw, long-term dry mouth that may accelerate tooth decay, osteoradionecrosis if she had a dental extraction in a radiated site, low thyroid function that may require thyroid replacement therapy. Rare complications of radiation were reviewed including damage to spinal cord, damage to nerves in the neck that could result in pain, weakness, or numbness of the arm, increased risk of stroke, damage to the brain, decreased hearing permanently due to fluid buildup or nerve damage, and inducing other cancers from radiation several years down the road.     Patient already had dental evaluation and the ready to proceed with radiation therapy.  We discussed options of a pre radiation audiology evaluation, swallowing study, and physical medicine and rehabilitation consultation. He was instructed on some gentle stretching exercises. The pros and the cons of different treatment options have been discussed with the patient in detail and at a great length.  Questions are answered to patient's satisfaction.  After a long discussion, the patient elected to proceed with concurrent chemo and radiation therapy as his treatment of choice being aware of potential risks and the side effects involved.  His simulation is tentatively scheduled for next week. I plan to give radiation therapy to a total dose of 7000/5400 cGy in 35 treatments targeted to the primary tumor/lymph nodes. I will consider to use IMRT/IGRT technique to help us to better locate the target and to protect normal tissues.  The patient is also scheduled to see Dr. Aldana, medical oncology  "next week for evaluation and discussion of chemotherapy.  I have also pointed out to the patient that if the combined chemoradiation therapy is not successful, the patient will require a surgery as a salvage at the end.  He understands it well and wished to proceed.    Again, thank you very much for the referral and allowing me to participate in the care of this patient.  If you have any questions or concerns about this consultation, please do not hesitate to call.  I spent approximately 60 minutes today with the patient and 80% time was used for counseling.      Sincerely,          Joyce Yang MD, PhD  Department of Radiation Oncology   [facility] Radiation Oncology  Tel: [phone]  Page: [phone]    [facility]  [address] [place] [address]     [address] [place] [address]    CC:  Patient Care Team:  Heidi Chairez MD as PCP - General (Family Medicine)  Heidi Chairez MD as Assigned PCP  Joyce Yang MD as MD (Radiation Oncology)  Michael Aldana MD (Internal Medicine)        Oncology Rooming Note    March 15, 2024 1:48 PM   Donovan Yi is a 73 year old male who presents for:    Chief Complaint   Patient presents with     Oncology Clinic Visit     Head And Neck Cancer     Rad Onc consult     Initial Vitals: BP (!) 151/68   Pulse 66   Resp 16   Ht 1.778 m (5' 10\")   Wt 83 kg (182 lb 14.4 oz)   SpO2 98%   BMI 26.24 kg/m   Estimated body mass index is 26.24 kg/m  as calculated from the following:    Height as of this encounter: 1.778 m (5' 10\").    Weight as of this encounter: 83 kg (182 lb 14.4 oz). Body surface area is 2.02 meters squared.  No Pain (0) Comment: Data Unavailable   No LMP for male patient.  Allergies reviewed: Yes  Medications reviewed: Yes    Medications: Medication refills not needed today.  Pharmacy name entered into [employer]: [id number] - [place], [place] - [address] [facility] [address]    Clinical " concerns: Here for Rad Onc consult for H&N Cancer. States he feels a lump at base of tongue, doesn't like laying on his back d/t tumor positioning.   Dr. Yang was notified.    Radiation Therapy Patient Education    Person involved with teaching: Patient and Wife    Patient educational needs for self management of treatment-related side effects assessment completed.  [facility] Patient Ed tab contains Patient Learning Assessment    Education Materials Given  Radiation Treatment For Cancer, Oral Care Guidelines, Radiation Therapy to the Head & Neck Guidelines, Welcome Letter, Insurance PA Information, Map [employer], and [employer] pamphlet.     Educational Topics Discussed  Side effects expected, Pain management, Activity, and Nutrition and weight loss    Response To Teaching  Verbalizes understanding    Referrals sent: Nutrition    Chemotherapy?  Yes: Consult with Dr. Aldana upcoming, 3/18/2024    No prior RT  No ICD  Had one dental extraction yesterday and otherwise cleared by Dental per pt  SLP & swallow study 3/28/2024  May want tx at , told pt to decide before his SIM appt.     Laura Hill, RN                Again, thank you for allowing me to participate in the care of your patient.        Sincerely,        Joyce Yang MD

## 2024-03-15 NOTE — PROGRESS NOTES
[facility] Radiation Oncology Consult Note     Patient: Donovan Yi  MRN: [id number]  Date of Service: 03/15/2024          Albert Pimentel MD  [address] [place] [id number]       Dear Dr. Pimentel:    Thank you very much for referring this patient for consideration of radiotherapy. As you know Mr. 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  The case has been reviewed at ENT tumor conference and that the consensus recommendation is to consider concurrent chemoradiation therapy. The patient is referred to radiation oncology for evaluation and consideration of definitive radiation therapy.    HISTORY OF PRESENT ILLNESS:   Mr. Yi is a 73 year old male who is a former smoker 1 pack a day for 16 years and quit smoking since 1984.  Patient presented with recent history of some pain and discomfort related to this and thinks that has been present for about a month. He denies any otalgia or tongue numbness.  He was found to have a lesion on the left side of the tongue.  He does not have a dyne aphasia or dysphagia. He has not had any hoarseness and has not noticed any lumps or bumps in his neck.  ENT examination revealed an obvious mass pushing forward into the junctional tongue and tongue base. It has a large crater in the center with some raised tissue laterally and looks endophytic into the left tonsillar fossa.  There is no palpable lymphadenopathy.  Biopsy was obtained on 2/28/2024 with pathology showed p16 positive squamous cell carcinoma.  Patient had a staging PET CT scan on 3/6/2024 which showed FDG avid 2.7 x 2.6 x 4.4 cm mass in the left base of tongue extending inferiorly into the vallecula compatible with biopsy-proven squamous cell carcinoma.  There is no evidence of  lymph nodes and systemic metastasis.  The case has been reviewed on ENT tumor conference of [facility].  The tumor was staged T3 N0 M0 and the consensus recommendation is to consider concurrent chemoradiation therapy.  He is referred to radiation oncology for evaluation and consideration of definitive radiation therapy.  Patient had a 25 years history of not able to tolerate most of food which caused abdominal pain and diarrhea.  He is only able to get pancakes down without any significant difficulty.  Patient has been seen numerous GI specialist without any significant improvement.    CHEMOTHERAPY HISTORY: Concurrent Chemotherapy: No    RADIATION THERAPY HISTORY: Prior Radiation: No    IMPLANTED CARDIAC DEVICE: none     Current Outpatient Medications   Medication Sig Dispense Refill    cyclobenzaprine (FLEXERIL) 10 MG tablet Take 10 mg by mouth 3 times daily as needed      Multiple Vitamins-Minerals (ONE-A-DAY 50 PLUS PO) Take 1 tablet by mouth daily      Probiotic Product (PROBIOTIC BLEND PO) Take 2 capsules by mouth daily Prebiotic and Probiotic blend      aspirin (ASA) 325 MG tablet Take 1 tablet by mouth      HYDROcodone-acetaminophen (NORCO)  MG per tablet       lisinopril (ZESTRIL) 10 MG tablet Take 2 tablets (20 mg) by mouth daily 90 tablet 1    rosuvastatin (CRESTOR) 20 MG tablet Take 1 tablet by mouth at bedtime      tamsulosin (FLOMAX) 0.4 MG capsule TAKE 1 CAPSULE(0.4 MG) BY MOUTH DAILY 90 capsule 1     Past Medical History:   Diagnosis Date    Food intolerance in adult     Hypertension     Malignant neoplasm of base of tongue (H)      Past Surgical History:   Procedure Laterality Date    HC REMOVAL GALLBLADDER      Description: Cholecystectomy;  Recorded: 12/06/2011;     Allergies   Allergen Reactions    Other Food Allergy Unknown     Many food intolerances     No family history on file.  Social History     Socioeconomic History    Marital status:      Spouse name: Not on file     Number of children: Not on file    Years of education: Not on file    Highest education level: Not on file   Occupational History    Not on file   Tobacco Use    Smoking status: Former     Packs/day: 1.00     Years: 16.00     Additional pack years: 0.00     Total pack years: 16.00     Types: Cigarettes     Start date: 1966     Quit date: 1984     Years since quittin.2     Passive exposure: Never    Smokeless tobacco: Never   Vaping Use    Vaping Use: Never used   Substance and Sexual Activity    Alcohol use: Not on file    Drug use: Not on file    Sexual activity: Not on file   Other Topics Concern    Not on file   Social History Narrative    Not on file     Social Determinants of Health     Financial Resource Strain: Not on file   Food Insecurity: Not on file   Transportation Needs: Not on file   Physical Activity: Not on file   Stress: Not on file   Social Connections: Not on file   Interpersonal Safety: Low Risk  (2024)    Interpersonal Safety     Do you feel physically and emotionally safe where you currently live?: Yes     Within the past 12 months, have you been hit, slapped, kicked or otherwise physically hurt by someone?: No     Within the past 12 months, have you been humiliated or emotionally abused in other ways by your partner or ex-partner?: No   Housing Stability: Not on file        REVIEW OF SYMPTOMS:  A full 14-point review of systems was performed. Pertinent findings are noted in the HPI.    General  Constitutional  Constitutional (WDL): Exceptions to WDL  Fatigue: Fatigue relieved by rest (insomnia)  EENT  Eye Disorders  Eye Disorder (WDL): All eye disorder elements are within defined limits  Ear Disorders  Ear Disorder (WDL): All ear disorder elements are within defined limits  Respiratory  Respiratory  Respiratory (WDL): Exceptions to WDL  Cough: Mild symptoms OR nonprescription intervention indicated  Dyspnea: Shortness of breath with moderate  "exertion  Cardiovascular  Cardiovascular  Cardiovascular (WDL): All cardiovascular elements are within defined limits  Gastrointestinal  Gastrointestinal  Gastrointestinal (WDL): Exceptions to WDL (has severe food intolerance, only eats pancakes)  Diarrhea: Increase of less than 4 stools per day over baseline OR mild increase in ostomy output compared to baseline  Musculoskeletal  Musculoskeletal and Connective Tissue Disorders  Musculoskeletal & Connective (WDL): All musculoskeletal & connective elements are within defined limits  Integumentary  Integumentary  Integumentary (WDL): All integumentary elements are within defined limits  Neurological  Neurosensory  Neurosensory (WDL): All neurosensory elements are within defined limits  Genitourinary/Reproductive  Genitourinary  Genitourinary (WDL): Exceptions to WDL  Urinary Frequency: Present  Lymphatic  Lymph System Disorders  Lymph (WDL): All lymph elements are within defined limits  Pain  Pain Score: No Pain (0)  AUA Assessment                                                              Accompanied by  Accompanied By: spouse    ECOG Status: 1    Imaging: Reviewed    Pathology: Reviewed    Objective:     PHYSICAL EXAMINATION:    BP (!) 151/68   Pulse 66   Resp 16   Ht 1.778 m (5' 10\")   Wt 83 kg (182 lb 14.4 oz)   SpO2 98%   BMI 26.24 kg/m      Gen: Alert, in NAD  Eyes: PERRL, EOMI, sclera anicteric  HENT     Head: NC/AT     Ears: No external auricular lesions     Nose/sinus: No rhinorrhea or epistaxis     Oropharynx: MMM, no visible oral lesions  Neck: Supple, full ROM, no LAD  Pulm: No wheezing, stridor or respiratory distress  CV: Well-perfused, no cyanosis, no pedal edema  Back: No step-offs or pain to palpation along the thoracolumbar spine  Rectal: Deferred  : Deferred  Musculoskeletal: Normal muscle bulk and tone  Skin: Normal color and turgor  Neurologic: A/Ox3, CN II-XII intact, normal gait and station  Psychiatric: Appropriate mood and " affect    Intent of Therapy: Curative  Side effects that may occur during or within weeks after Radiation Therapy    Fatigue and general weakness  Loss of hair on the face and neck  Pain in the irradiated limb  Darkening, irritation, itchiness, redness, dryness,and peeling, scabbing and ulceration of the skin on the neck and face  Mouth and throat dryness, irritation and swallowing difficulties and infection  Thickening of the saliva  Change in or loss of taste sensation  Decrease in appetite  Hoarseness and change in voice    Side effects that may occur months or years after Radiation Therapy    Development of another tumor or cancer  Thickening, telangiectasias (development of spider like blood vessels in the skin) and ulceration of the skin of the face and neck  Fibrosis (scar tissue), decreased flexibility and swelling of the neck  Brain inflammation or necrosis that may cause various neurologic symptoms  Decrease in memory and thinking abilities  Poor healing after a trauma or surgery in the irradiated area  Facial numbness, pain and weakness  Nerve damage resulting in loss of strength and sensation  Tooth and jaw decay and poor healing after dental procedures    The risks, benefits and alternatives to radiation therapy were outlined with the patient. All questions were answered and a consent was signed.     Impression     Squamous cell carcinoma involving left base of tongue, clinical stage T3 N0 M0, p16 positive, staging workup including PET/CT scan showed no evidence of lymph nodes and systemic metastasis.  The case has been reviewed at ENT tumor conference and that the consensus recommendation is to consider concurrent chemoradiation therapy.      Assessment & Plan:     I have personally reviewed his upcoming medical record today.  I have also reviewed his recent radiology study including CT scan and PET scan.  This is a 73-year-old gentleman with a recent diagnosis of squamous cell carcinoma of the left  base of tongue with no evidence of lymph nodes and systemic metastasis. The possible treatment options for the head and neck cancer have been discussed with the patient including surgery, chemotherapy, and radiation therapy.  The possible risks and the side effects of radiation therapy have also been explained to the patient in detail and at a great length. I agree with you, the patient is a good candidate for concurrent chemoradiation therapy.  I think the patient can be potentially benefited by the combined chemoradiation therapy for local control and possibly a better survival.  I have explained to the patient that combined chemoradiation therapy is a quite intensive protocol for the head and neck cancer.  The patient often will require extensive support during and after the planned therapy.  The patient may also require short-term hospital stay toward the end of her therapy.  Majority of our patients will have a difficulty of swallowing during the course of the radiation therapy.  Most of our patient will get back to placement for nutritional support prior chemoradiation therapy.  The patient had a history of difficulty of tolerating any food intake and I think the PEG tube may not be helpful in his situation.  It may be reasonable to consider TPN through central line.    The radiation simulation, treatment planning, image guidance, and treatment delivery processes from my handout were reviewed. The acute effects of radiation were reviewed including fatigue, skin burn, soreness of the throat that may be severe enough to require narcotics for pain control, hair loss that may be permanent, loss of taste, dry mouth that may be permanent, dehydration or weight loss that may require intravenous fluids, hospitalization, or feeding tube, plugging of the ear due to fluid buildup in the middle ear that may require tympanostomy tube placement. Possible late effects of radiation were discussed including long-term  difficulty swallowing, swelling or scarring of the neck tissues that may cause stiffness of the neck or jaw, long-term dry mouth that may accelerate tooth decay, osteoradionecrosis if she had a dental extraction in a radiated site, low thyroid function that may require thyroid replacement therapy. Rare complications of radiation were reviewed including damage to spinal cord, damage to nerves in the neck that could result in pain, weakness, or numbness of the arm, increased risk of stroke, damage to the brain, decreased hearing permanently due to fluid buildup or nerve damage, and inducing other cancers from radiation several years down the road.     Patient already had dental evaluation and the ready to proceed with radiation therapy.  We discussed options of a pre radiation audiology evaluation, swallowing study, and physical medicine and rehabilitation consultation. He was instructed on some gentle stretching exercises. The pros and the cons of different treatment options have been discussed with the patient in detail and at a great length.  Questions are answered to patient's satisfaction.  After a long discussion, the patient elected to proceed with concurrent chemo and radiation therapy as his treatment of choice being aware of potential risks and the side effects involved.  His simulation is tentatively scheduled for next week. I plan to give radiation therapy to a total dose of 7000/5400 cGy in 35 treatments targeted to the primary tumor/lymph nodes. I will consider to use IMRT/IGRT technique to help us to better locate the target and to protect normal tissues.  The patient is also scheduled to see Dr. Aldana, medical oncology next week for evaluation and discussion of chemotherapy.  I have also pointed out to the patient that if the combined chemoradiation therapy is not successful, the patient will require a surgery as a salvage at the end.  He understands it well and wished to proceed.    Again, thank you  very much for the referral and allowing me to participate in the care of this patient.  If you have any questions or concerns about this consultation, please do not hesitate to call.  I spent approximately 60 minutes today with the patient and 80% time was used for counseling.      Sincerely,          Joyce Yang MD, PhD  Department of Radiation Oncology   [facility] Radiation Oncology  Tel: [phone]  Page: [phone]    [facility]  [address] [place] [address]     [address]    [place] [address]    CC:  Patient Care Team:  Heidi Chairez MD as PCP - General (Family Medicine)  Heidi Chairez MD as Assigned PCP  Joyce Yang MD as MD (Radiation Oncology)  Michael Aldana MD (Internal Medicine)

## 2024-03-18 ENCOUNTER — PRE VISIT (OUTPATIENT)
Dept: ONCOLOGY | Facility: HOSPITAL | Age: 74
End: 2024-03-18
Payer: MEDICARE

## 2024-03-18 ENCOUNTER — ONCOLOGY VISIT (OUTPATIENT)
Dept: ONCOLOGY | Facility: HOSPITAL | Age: 74
End: 2024-03-18
Attending: OTOLARYNGOLOGY
Payer: MEDICARE

## 2024-03-18 VITALS
BODY MASS INDEX: 26.05 KG/M2 | WEIGHT: 182 LBS | HEART RATE: 72 BPM | HEIGHT: 70 IN | DIASTOLIC BLOOD PRESSURE: 60 MMHG | OXYGEN SATURATION: 98 % | RESPIRATION RATE: 18 BRPM | SYSTOLIC BLOOD PRESSURE: 130 MMHG

## 2024-03-18 DIAGNOSIS — R91.8 PULMONARY NODULES: ICD-10-CM

## 2024-03-18 DIAGNOSIS — C01 PRIMARY SQUAMOUS CELL CARCINOMA OF BASE OF TONGUE (H): Primary | ICD-10-CM

## 2024-03-18 DIAGNOSIS — F51.01 PRIMARY INSOMNIA: ICD-10-CM

## 2024-03-18 PROCEDURE — G0463 HOSPITAL OUTPT CLINIC VISIT: HCPCS | Performed by: INTERNAL MEDICINE

## 2024-03-18 PROCEDURE — 99205 OFFICE O/P NEW HI 60 MIN: CPT | Performed by: INTERNAL MEDICINE

## 2024-03-18 RX ORDER — ALBUTEROL SULFATE 90 UG/1
1-2 AEROSOL, METERED RESPIRATORY (INHALATION)
Status: CANCELLED
Start: 2024-04-17

## 2024-03-18 RX ORDER — METHYLPREDNISOLONE SODIUM SUCCINATE 125 MG/2ML
125 INJECTION, POWDER, LYOPHILIZED, FOR SOLUTION INTRAMUSCULAR; INTRAVENOUS
Status: CANCELLED
Start: 2024-04-24

## 2024-03-18 RX ORDER — DIPHENHYDRAMINE HYDROCHLORIDE 50 MG/ML
50 INJECTION INTRAMUSCULAR; INTRAVENOUS
Status: CANCELLED
Start: 2024-04-03

## 2024-03-18 RX ORDER — MEPERIDINE HYDROCHLORIDE 50 MG/ML
25 INJECTION INTRAMUSCULAR; INTRAVENOUS; SUBCUTANEOUS EVERY 30 MIN PRN
Status: CANCELLED | OUTPATIENT
Start: 2024-04-10

## 2024-03-18 RX ORDER — PALONOSETRON 0.05 MG/ML
0.25 INJECTION, SOLUTION INTRAVENOUS ONCE
Status: CANCELLED | OUTPATIENT
Start: 2024-04-17

## 2024-03-18 RX ORDER — ALBUTEROL SULFATE 90 UG/1
1-2 AEROSOL, METERED RESPIRATORY (INHALATION)
Status: CANCELLED
Start: 2024-05-08

## 2024-03-18 RX ORDER — EPINEPHRINE 1 MG/ML
0.3 INJECTION, SOLUTION INTRAMUSCULAR; SUBCUTANEOUS EVERY 5 MIN PRN
Status: CANCELLED | OUTPATIENT
Start: 2024-05-08

## 2024-03-18 RX ORDER — PALONOSETRON 0.05 MG/ML
0.25 INJECTION, SOLUTION INTRAVENOUS ONCE
Status: CANCELLED | OUTPATIENT
Start: 2024-04-03

## 2024-03-18 RX ORDER — ONDANSETRON 8 MG/1
8 TABLET, FILM COATED ORAL EVERY 8 HOURS PRN
Qty: 30 TABLET | Refills: 2 | Status: SHIPPED | OUTPATIENT
Start: 2024-03-18 | End: 2024-07-26

## 2024-03-18 RX ORDER — ALBUTEROL SULFATE 0.83 MG/ML
2.5 SOLUTION RESPIRATORY (INHALATION)
Status: CANCELLED | OUTPATIENT
Start: 2024-04-10

## 2024-03-18 RX ORDER — DIPHENHYDRAMINE HYDROCHLORIDE 50 MG/ML
50 INJECTION INTRAMUSCULAR; INTRAVENOUS
Status: CANCELLED
Start: 2024-05-08

## 2024-03-18 RX ORDER — EPINEPHRINE 1 MG/ML
0.3 INJECTION, SOLUTION INTRAMUSCULAR; SUBCUTANEOUS EVERY 5 MIN PRN
Status: CANCELLED | OUTPATIENT
Start: 2024-04-24

## 2024-03-18 RX ORDER — HEPARIN SODIUM,PORCINE 10 UNIT/ML
5-20 VIAL (ML) INTRAVENOUS DAILY PRN
Status: CANCELLED | OUTPATIENT
Start: 2024-05-08

## 2024-03-18 RX ORDER — ALBUTEROL SULFATE 90 UG/1
1-2 AEROSOL, METERED RESPIRATORY (INHALATION)
Status: CANCELLED
Start: 2024-04-03

## 2024-03-18 RX ORDER — MEPERIDINE HYDROCHLORIDE 50 MG/ML
25 INJECTION INTRAMUSCULAR; INTRAVENOUS; SUBCUTANEOUS EVERY 30 MIN PRN
Status: CANCELLED | OUTPATIENT
Start: 2024-05-01

## 2024-03-18 RX ORDER — MEPERIDINE HYDROCHLORIDE 50 MG/ML
25 INJECTION INTRAMUSCULAR; INTRAVENOUS; SUBCUTANEOUS EVERY 30 MIN PRN
Status: CANCELLED | OUTPATIENT
Start: 2024-04-03

## 2024-03-18 RX ORDER — ALBUTEROL SULFATE 0.83 MG/ML
2.5 SOLUTION RESPIRATORY (INHALATION)
Status: CANCELLED | OUTPATIENT
Start: 2024-04-17

## 2024-03-18 RX ORDER — METHYLPREDNISOLONE SODIUM SUCCINATE 125 MG/2ML
125 INJECTION, POWDER, LYOPHILIZED, FOR SOLUTION INTRAMUSCULAR; INTRAVENOUS
Status: CANCELLED
Start: 2024-04-10

## 2024-03-18 RX ORDER — DIPHENHYDRAMINE HYDROCHLORIDE 50 MG/ML
50 INJECTION INTRAMUSCULAR; INTRAVENOUS
Status: CANCELLED
Start: 2024-05-01

## 2024-03-18 RX ORDER — LORAZEPAM 2 MG/ML
0.5 INJECTION INTRAMUSCULAR EVERY 4 HOURS PRN
Status: CANCELLED | OUTPATIENT
Start: 2024-04-17

## 2024-03-18 RX ORDER — HEPARIN SODIUM (PORCINE) LOCK FLUSH IV SOLN 100 UNIT/ML 100 UNIT/ML
5 SOLUTION INTRAVENOUS
Status: CANCELLED | OUTPATIENT
Start: 2024-05-08

## 2024-03-18 RX ORDER — ALBUTEROL SULFATE 90 UG/1
1-2 AEROSOL, METERED RESPIRATORY (INHALATION)
Status: CANCELLED
Start: 2024-05-01

## 2024-03-18 RX ORDER — HEPARIN SODIUM (PORCINE) LOCK FLUSH IV SOLN 100 UNIT/ML 100 UNIT/ML
5 SOLUTION INTRAVENOUS
Status: CANCELLED | OUTPATIENT
Start: 2024-04-10

## 2024-03-18 RX ORDER — MEPERIDINE HYDROCHLORIDE 50 MG/ML
25 INJECTION INTRAMUSCULAR; INTRAVENOUS; SUBCUTANEOUS EVERY 30 MIN PRN
Status: CANCELLED | OUTPATIENT
Start: 2024-04-24

## 2024-03-18 RX ORDER — ALBUTEROL SULFATE 90 UG/1
1-2 AEROSOL, METERED RESPIRATORY (INHALATION)
Status: CANCELLED
Start: 2024-04-10

## 2024-03-18 RX ORDER — METHYLPREDNISOLONE SODIUM SUCCINATE 125 MG/2ML
125 INJECTION, POWDER, LYOPHILIZED, FOR SOLUTION INTRAMUSCULAR; INTRAVENOUS
Status: CANCELLED
Start: 2024-04-03

## 2024-03-18 RX ORDER — MEPERIDINE HYDROCHLORIDE 50 MG/ML
25 INJECTION INTRAMUSCULAR; INTRAVENOUS; SUBCUTANEOUS EVERY 30 MIN PRN
Status: CANCELLED | OUTPATIENT
Start: 2024-05-08

## 2024-03-18 RX ORDER — DEXAMETHASONE 4 MG/1
8 TABLET ORAL DAILY
Qty: 6 TABLET | Refills: 5 | Status: SHIPPED | OUTPATIENT
Start: 2024-03-18 | End: 2024-07-26

## 2024-03-18 RX ORDER — LORAZEPAM 2 MG/ML
0.5 INJECTION INTRAMUSCULAR EVERY 4 HOURS PRN
Status: CANCELLED | OUTPATIENT
Start: 2024-04-10

## 2024-03-18 RX ORDER — LORAZEPAM 2 MG/ML
0.5 INJECTION INTRAMUSCULAR EVERY 4 HOURS PRN
Status: CANCELLED | OUTPATIENT
Start: 2024-05-08

## 2024-03-18 RX ORDER — ALBUTEROL SULFATE 90 UG/1
1-2 AEROSOL, METERED RESPIRATORY (INHALATION)
Status: CANCELLED
Start: 2024-04-24

## 2024-03-18 RX ORDER — METHYLPREDNISOLONE SODIUM SUCCINATE 125 MG/2ML
125 INJECTION, POWDER, LYOPHILIZED, FOR SOLUTION INTRAMUSCULAR; INTRAVENOUS
Status: CANCELLED
Start: 2024-05-01

## 2024-03-18 RX ORDER — ALBUTEROL SULFATE 0.83 MG/ML
2.5 SOLUTION RESPIRATORY (INHALATION)
Status: CANCELLED | OUTPATIENT
Start: 2024-05-08

## 2024-03-18 RX ORDER — HEPARIN SODIUM (PORCINE) LOCK FLUSH IV SOLN 100 UNIT/ML 100 UNIT/ML
5 SOLUTION INTRAVENOUS
Status: CANCELLED | OUTPATIENT
Start: 2024-04-17

## 2024-03-18 RX ORDER — TRAZODONE HYDROCHLORIDE 100 MG/1
100 TABLET ORAL AT BEDTIME
Qty: 30 TABLET | Refills: 2 | Status: SHIPPED | OUTPATIENT
Start: 2024-03-18 | End: 2024-04-19 | Stop reason: SINTOL

## 2024-03-18 RX ORDER — ALBUTEROL SULFATE 0.83 MG/ML
2.5 SOLUTION RESPIRATORY (INHALATION)
Status: CANCELLED | OUTPATIENT
Start: 2024-04-03

## 2024-03-18 RX ORDER — LORAZEPAM 2 MG/ML
0.5 INJECTION INTRAMUSCULAR EVERY 4 HOURS PRN
Status: CANCELLED | OUTPATIENT
Start: 2024-05-01

## 2024-03-18 RX ORDER — ALBUTEROL SULFATE 0.83 MG/ML
2.5 SOLUTION RESPIRATORY (INHALATION)
Status: CANCELLED | OUTPATIENT
Start: 2024-04-24

## 2024-03-18 RX ORDER — EPINEPHRINE 1 MG/ML
0.3 INJECTION, SOLUTION INTRAMUSCULAR; SUBCUTANEOUS EVERY 5 MIN PRN
Status: CANCELLED | OUTPATIENT
Start: 2024-04-10

## 2024-03-18 RX ORDER — PALONOSETRON 0.05 MG/ML
0.25 INJECTION, SOLUTION INTRAVENOUS ONCE
Status: CANCELLED | OUTPATIENT
Start: 2024-05-01

## 2024-03-18 RX ORDER — HEPARIN SODIUM (PORCINE) LOCK FLUSH IV SOLN 100 UNIT/ML 100 UNIT/ML
5 SOLUTION INTRAVENOUS
Status: CANCELLED | OUTPATIENT
Start: 2024-04-03

## 2024-03-18 RX ORDER — HEPARIN SODIUM (PORCINE) LOCK FLUSH IV SOLN 100 UNIT/ML 100 UNIT/ML
5 SOLUTION INTRAVENOUS
Status: CANCELLED | OUTPATIENT
Start: 2024-04-24

## 2024-03-18 RX ORDER — PALONOSETRON 0.05 MG/ML
0.25 INJECTION, SOLUTION INTRAVENOUS ONCE
Status: CANCELLED | OUTPATIENT
Start: 2024-04-24

## 2024-03-18 RX ORDER — EPINEPHRINE 1 MG/ML
0.3 INJECTION, SOLUTION INTRAMUSCULAR; SUBCUTANEOUS EVERY 5 MIN PRN
Status: CANCELLED | OUTPATIENT
Start: 2024-05-01

## 2024-03-18 RX ORDER — HEPARIN SODIUM,PORCINE 10 UNIT/ML
5-20 VIAL (ML) INTRAVENOUS DAILY PRN
Status: CANCELLED | OUTPATIENT
Start: 2024-04-03

## 2024-03-18 RX ORDER — HEPARIN SODIUM,PORCINE 10 UNIT/ML
5-20 VIAL (ML) INTRAVENOUS DAILY PRN
Status: CANCELLED | OUTPATIENT
Start: 2024-04-24

## 2024-03-18 RX ORDER — LORAZEPAM 2 MG/ML
0.5 INJECTION INTRAMUSCULAR EVERY 4 HOURS PRN
Status: CANCELLED | OUTPATIENT
Start: 2024-04-03

## 2024-03-18 RX ORDER — HEPARIN SODIUM,PORCINE 10 UNIT/ML
5-20 VIAL (ML) INTRAVENOUS DAILY PRN
Status: CANCELLED | OUTPATIENT
Start: 2024-04-17

## 2024-03-18 RX ORDER — HEPARIN SODIUM,PORCINE 10 UNIT/ML
5-20 VIAL (ML) INTRAVENOUS DAILY PRN
Status: CANCELLED | OUTPATIENT
Start: 2024-05-01

## 2024-03-18 RX ORDER — DIPHENHYDRAMINE HYDROCHLORIDE 50 MG/ML
50 INJECTION INTRAMUSCULAR; INTRAVENOUS
Status: CANCELLED
Start: 2024-04-10

## 2024-03-18 RX ORDER — EPINEPHRINE 1 MG/ML
0.3 INJECTION, SOLUTION INTRAMUSCULAR; SUBCUTANEOUS EVERY 5 MIN PRN
Status: CANCELLED | OUTPATIENT
Start: 2024-04-17

## 2024-03-18 RX ORDER — EPINEPHRINE 1 MG/ML
0.3 INJECTION, SOLUTION INTRAMUSCULAR; SUBCUTANEOUS EVERY 5 MIN PRN
Status: CANCELLED | OUTPATIENT
Start: 2024-04-03

## 2024-03-18 RX ORDER — LORAZEPAM 2 MG/ML
0.5 INJECTION INTRAMUSCULAR EVERY 4 HOURS PRN
Status: CANCELLED | OUTPATIENT
Start: 2024-04-24

## 2024-03-18 RX ORDER — METHYLPREDNISOLONE SODIUM SUCCINATE 125 MG/2ML
125 INJECTION, POWDER, LYOPHILIZED, FOR SOLUTION INTRAMUSCULAR; INTRAVENOUS
Status: CANCELLED
Start: 2024-04-17

## 2024-03-18 RX ORDER — DIPHENHYDRAMINE HYDROCHLORIDE 50 MG/ML
50 INJECTION INTRAMUSCULAR; INTRAVENOUS
Status: CANCELLED
Start: 2024-04-17

## 2024-03-18 RX ORDER — HEPARIN SODIUM (PORCINE) LOCK FLUSH IV SOLN 100 UNIT/ML 100 UNIT/ML
5 SOLUTION INTRAVENOUS
Status: CANCELLED | OUTPATIENT
Start: 2024-05-01

## 2024-03-18 RX ORDER — DIPHENHYDRAMINE HYDROCHLORIDE 50 MG/ML
50 INJECTION INTRAMUSCULAR; INTRAVENOUS
Status: CANCELLED
Start: 2024-04-24

## 2024-03-18 RX ORDER — PALONOSETRON 0.05 MG/ML
0.25 INJECTION, SOLUTION INTRAVENOUS ONCE
Status: CANCELLED | OUTPATIENT
Start: 2024-04-10

## 2024-03-18 RX ORDER — ALBUTEROL SULFATE 0.83 MG/ML
2.5 SOLUTION RESPIRATORY (INHALATION)
Status: CANCELLED | OUTPATIENT
Start: 2024-05-01

## 2024-03-18 RX ORDER — PALONOSETRON 0.05 MG/ML
0.25 INJECTION, SOLUTION INTRAVENOUS ONCE
Status: CANCELLED | OUTPATIENT
Start: 2024-05-08

## 2024-03-18 RX ORDER — HEPARIN SODIUM,PORCINE 10 UNIT/ML
5-20 VIAL (ML) INTRAVENOUS DAILY PRN
Status: CANCELLED | OUTPATIENT
Start: 2024-04-10

## 2024-03-18 RX ORDER — MEPERIDINE HYDROCHLORIDE 50 MG/ML
25 INJECTION INTRAMUSCULAR; INTRAVENOUS; SUBCUTANEOUS EVERY 30 MIN PRN
Status: CANCELLED | OUTPATIENT
Start: 2024-04-17

## 2024-03-18 RX ORDER — METHYLPREDNISOLONE SODIUM SUCCINATE 125 MG/2ML
125 INJECTION, POWDER, LYOPHILIZED, FOR SOLUTION INTRAMUSCULAR; INTRAVENOUS
Status: CANCELLED
Start: 2024-05-08

## 2024-03-18 ASSESSMENT — PAIN SCALES - GENERAL: PAINLEVEL: MILD PAIN (3)

## 2024-03-18 NOTE — PROGRESS NOTES
"[facility] Hematology and Oncology Progress Note    Patient: Donovan Yi  MRN: [id number]  Date of Service: Mar 18, 2024        Assessment and Plan:    1.  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  We reviewed the pathologic and radiographic findings.  I personally reviewed his PET scan images.  He has already met with radiation oncology.  He has a simulation later this week.  I reviewed with him the rationale for adding weekly cisplatin to his radiation in terms of increasing his risk of Kear and subsequent lowering of the risk of needing surgery I reviewed some the more common side effects of the regimen which include, but are not limited to, fatigue, decreased stamina, nausea, taste disturbance, peripheral neuropathy, cytopenias, kidney dysfunction, and mucositis.  Will see him in clinic for an evaluation weeks 2, 4, and 6.  Plan 6 treatments.  We talked about IV access.  I think he might benefit from a port as he says he has \"bad veins\".  However he wants to try cycle 1 without a port and see how it goes.  I think this is reasonable.  He was given some written information on cisplatin to take home and review.  Questions were answered.    2.  Right upper lobe groundglass nodule: 4.2 cm.  Minimal FDG uptake.  Repeat CT scan follow-up in 4 months.  Follow radiographically.  If evidence of enlargement may need to biopsy and treated with radiation.  Unlikely to be related to the tongue cancer.    3.  F/E/N: We discussed the possible need for tube feeding to maintain nutrition and hydration.  The patient has a very limited diet which may make the maintenance of nutrition difficult.  We talked about J-tube placement.  We also briefly discussed TPN which I told them with less desirable.  Will have to follow closely.    4.  Insomnia: We are going to try trazodone for him.    5.  Possible BPH: He has nocturia 4 times a night.  He does endorse that sometimes it " feels like he does not empty his bladder completely.  He is on Flomax.  Will see if there is any other options for treatment in this regard.    ECOG Performance  0    Diagnosis:    Base of tongue cancer    Treatment:    None to date    Interim History:    Phani is a 73-year-old gentleman who is referred today for eval ration of tongue cancer.  He initially presented to his primary care provider in mid February of this year with a tongue mass.  Located on the left posterior tongue.  He was referred to ENT.  He was seen on February 28.  At that time he was not having any otalgia or tongue numbness.  There was no odynophagia or dysphagia.  No change in his voice.  Biopsy was taken which revealed a p16 positive squamous cell carcinoma.  He then went on to have PET scan and CT scan imaging.  The PET scan showed a hypermetabolic mass at the left tongue base which extends inferiorly into the vallecula.  Measures 2.7 x 2.6 x 4.4 cm.  No evidence of latricia or metastatic disease.  His case was presented at tumor conference at the [facility].  Concurrent therapy was recommended.  He comes in today to discuss chemotherapy.  He notes that he is having some more pain associated with the tongue mass and also some increased difficulty with swallowing.  No fevers.    Review of Systems:    As above in the history.     Review of Systems otherwise Negative for:  General: chills, fever or night sweats  Psychological: anxiety or depression  Ophthalmic: blurry vision, double vision or loss of vision, vision change  ENT: epistaxis, oral lesions, hearing changes  Hematological and Lymphatic: bleeding, bruising, jaundice, swollen lymph nodes  Endocrine: hot flashes, unexpected weight changes  Respiratory: cough, hemoptysis, orthopnea or shortness of breath/FLETCHER  Cardiovascular: chest pain, edema, palpitations or PND  Gastrointestinal: abdominal pain, blood in stools, change in bowel habits, constipation  Genito-Urinary: change in urinary  "stream, incontinence, frequency/urgency  Musculoskeletal: joint pain, stiffness, swelling, muscle pain  Neurological: dizziness, headaches, numbness/tingling  Dermatological: lumps and rash    Past History:    Past Medical History:   Diagnosis Date    Food intolerance in adult     Hypertension     Malignant neoplasm of base of tongue (H)      Physical Exam:    /60   Pulse 72   Resp 18   Ht 1.778 m (5' 10\")   Wt 82.6 kg (182 lb)   SpO2 98%   BMI 26.11 kg/m      General: patient appears stated age of 73 year old. Nontoxic and in no distress.   HEENT: Head: atraumatic, normocephalic. Sclerae anicteric.  Chest:  Normal respiratory effort  Cardiac:  No edema.   Abdomen: abdomen is non-distended  Extremities: normal tone and muscle bulk.  Skin: no lesions or rash on visible skin. Warm and dry.   CNS: alert and oriented. Grossly non-focal.   Psychiatric: normal mood and affect.     Lab Results:    No results found for this or any previous visit (from the past 168 hour(s)).    Imaging:    PET Oncology Whole Body    Result Date: 3/6/2024  EXAM: PET ONCOLOGY WHOLE BODY, CT SOFT TISSUE NECK W CONTRAST, CT CHEST/ABDOMEN/PELVIS W CONTRAST LOCATION: [facility] DATE: 3/6/2024 INDICATION: Initial treatment planning and staging for malignant neoplasm of base of tongue. Tongue base squamous cell carcinoma. COMPARISON: No relevant prior comparison imaging. CONTRAST: 90 mL Isovue-370. TECHNIQUE: Serum glucose level 110 mg/dL. One hour post intravenous administration of 10.9 mCi F-18 FDG, PET imaging was performed from the skull vertex to feet, utilizing attenuation correction with concurrent axial CT and PET/CT image fusion. Separate diagnostic CT of the neck, chest, abdomen, and pelvis was performed. Dose reduction techniques were used. LIMITATIONS: Evaluation of the calvarium is limited by patient motion artifact. PET/CT FINDINGS: Hypermetabolic left base of tongue mass which extends inferiorly " into the vallecula compatible with biopsy-proven squamous cell carcinoma. This mass measures up to 2.7 x 2.6 x 4.4 cm with SUV max of 23.8. No evidence of FDG avid latricia or  distant metastatic disease. A right upper lobe some solid opacity measures up to 4.2 cm and demonstrates minimal FDG uptake with SUV max of 1.4. There are additional adjacent smaller groundglass nodule/opacities. CT FINDINGS: No cervical adenopathy. Mild coronary artery calcifications. Nodular opacity in the right upper lobe apex without FDG avidity is favored scarring (series 7, image 16). Right middle lobe 3 mm subpleural nodule (series 7, image 76) and right lower lobe 3 mm subpleural nodule (series 7, image 79), below the resolution of PET. Cholecystectomy. Mild intrahepatic and extra hepatic biliary ductal dilatation is likely secondary to postcholecystectomy reservoir state. Lobulated contour of both kidneys. Few colonic diverticula. Small duodenal diverticulum. Enlargement of the median lobe of the prostate gland with mass effect on the posterior bladder. Circumferential bladder wall thickening is nonspecific but may be secondary to chronic outlet obstruction. Small fat-containing umbilical hernia. Degenerative changes of the spine.     IMPRESSION: 1.  Hypermetabolic left base of tongue mass extending inferiorly into the vallecula, compatible with biopsy-proven squamous cell carcinoma. No evidence of FDG avid metastatic disease. 2.  Right upper lobe groundglass nodules/opacities with minimal FDG uptake. These may represent infectious foci versus groundglass nodules/adenocarcinoma spectrum lesions. Recommend attention on future oncologic imaging. Additional small solid pulmonary nodules are below the resolution of PET and can be followed on future imaging.    CT Chest/Abdomen/Pelvis w Contrast    Result Date: 3/6/2024  EXAM: PET ONCOLOGY WHOLE BODY, CT SOFT TISSUE NECK W CONTRAST, CT CHEST/ABDOMEN/PELVIS W CONTRAST LOCATION: [facility]  [facility] DATE: 3/6/2024 INDICATION: Initial treatment planning and staging for malignant neoplasm of base of tongue. Tongue base squamous cell carcinoma. COMPARISON: No relevant prior comparison imaging. CONTRAST: 90 mL Isovue-370. TECHNIQUE: Serum glucose level 110 mg/dL. One hour post intravenous administration of 10.9 mCi F-18 FDG, PET imaging was performed from the skull vertex to feet, utilizing attenuation correction with concurrent axial CT and PET/CT image fusion. Separate diagnostic CT of the neck, chest, abdomen, and pelvis was performed. Dose reduction techniques were used. LIMITATIONS: Evaluation of the calvarium is limited by patient motion artifact. PET/CT FINDINGS: Hypermetabolic left base of tongue mass which extends inferiorly into the vallecula compatible with biopsy-proven squamous cell carcinoma. This mass measures up to 2.7 x 2.6 x 4.4 cm with SUV max of 23.8. No evidence of FDG avid latricia or  distant metastatic disease. A right upper lobe some solid opacity measures up to 4.2 cm and demonstrates minimal FDG uptake with SUV max of 1.4. There are additional adjacent smaller groundglass nodule/opacities. CT FINDINGS: No cervical adenopathy. Mild coronary artery calcifications. Nodular opacity in the right upper lobe apex without FDG avidity is favored scarring (series 7, image 16). Right middle lobe 3 mm subpleural nodule (series 7, image 76) and right lower lobe 3 mm subpleural nodule (series 7, image 79), below the resolution of PET. Cholecystectomy. Mild intrahepatic and extra hepatic biliary ductal dilatation is likely secondary to postcholecystectomy reservoir state. Lobulated contour of both kidneys. Few colonic diverticula. Small duodenal diverticulum. Enlargement of the median lobe of the prostate gland with mass effect on the posterior bladder. Circumferential bladder wall thickening is nonspecific but may be secondary to chronic outlet obstruction. Small fat-containing  umbilical hernia. Degenerative changes of the spine.     IMPRESSION: 1.  Hypermetabolic left base of tongue mass extending inferiorly into the vallecula, compatible with biopsy-proven squamous cell carcinoma. No evidence of FDG avid metastatic disease. 2.  Right upper lobe groundglass nodules/opacities with minimal FDG uptake. These may represent infectious foci versus groundglass nodules/adenocarcinoma spectrum lesions. Recommend attention on future oncologic imaging. Additional small solid pulmonary nodules are below the resolution of PET and can be followed on future imaging.    CT Soft Tissue Neck w Contrast    Result Date: 3/6/2024  EXAM: PET ONCOLOGY WHOLE BODY, CT SOFT TISSUE NECK W CONTRAST, CT CHEST/ABDOMEN/PELVIS W CONTRAST LOCATION: [facility] DATE: 3/6/2024 INDICATION: Initial treatment planning and staging for malignant neoplasm of base of tongue. Tongue base squamous cell carcinoma. COMPARISON: No relevant prior comparison imaging. CONTRAST: 90 mL Isovue-370. TECHNIQUE: Serum glucose level 110 mg/dL. One hour post intravenous administration of 10.9 mCi F-18 FDG, PET imaging was performed from the skull vertex to feet, utilizing attenuation correction with concurrent axial CT and PET/CT image fusion. Separate diagnostic CT of the neck, chest, abdomen, and pelvis was performed. Dose reduction techniques were used. LIMITATIONS: Evaluation of the calvarium is limited by patient motion artifact. PET/CT FINDINGS: Hypermetabolic left base of tongue mass which extends inferiorly into the vallecula compatible with biopsy-proven squamous cell carcinoma. This mass measures up to 2.7 x 2.6 x 4.4 cm with SUV max of 23.8. No evidence of FDG avid latricia or  distant metastatic disease. A right upper lobe some solid opacity measures up to 4.2 cm and demonstrates minimal FDG uptake with SUV max of 1.4. There are additional adjacent smaller groundglass nodule/opacities. CT FINDINGS: No cervical  adenopathy. Mild coronary artery calcifications. Nodular opacity in the right upper lobe apex without FDG avidity is favored scarring (series 7, image 16). Right middle lobe 3 mm subpleural nodule (series 7, image 76) and right lower lobe 3 mm subpleural nodule (series 7, image 79), below the resolution of PET. Cholecystectomy. Mild intrahepatic and extra hepatic biliary ductal dilatation is likely secondary to postcholecystectomy reservoir state. Lobulated contour of both kidneys. Few colonic diverticula. Small duodenal diverticulum. Enlargement of the median lobe of the prostate gland with mass effect on the posterior bladder. Circumferential bladder wall thickening is nonspecific but may be secondary to chronic outlet obstruction. Small fat-containing umbilical hernia. Degenerative changes of the spine.     IMPRESSION: 1.  Hypermetabolic left base of tongue mass extending inferiorly into the vallecula, compatible with biopsy-proven squamous cell carcinoma. No evidence of FDG avid metastatic disease. 2.  Right upper lobe groundglass nodules/opacities with minimal FDG uptake. These may represent infectious foci versus groundglass nodules/adenocarcinoma spectrum lesions. Recommend attention on future oncologic imaging. Additional small solid pulmonary nodules are below the resolution of PET and can be followed on future imaging.     Signed by: Michael Aldana MD

## 2024-03-18 NOTE — PROGRESS NOTES
"Oncology Rooming Note    March 18, 2024 11:10 AM   Donovan Yi is a 73 year old male who presents for:    Chief Complaint   Patient presents with    Oncology Clinic Visit     New consult Malignant neoplasm of base of tongue        Initial Vitals: /60   Pulse 72   Resp 18   Ht 1.778 m (5' 10\")   Wt 82.6 kg (182 lb)   SpO2 98%   BMI 26.11 kg/m   Estimated body mass index is 26.11 kg/m  as calculated from the following:    Height as of this encounter: 1.778 m (5' 10\").    Weight as of this encounter: 82.6 kg (182 lb). Body surface area is 2.02 meters squared.  Mild Pain (3) Comment: Data Unavailable   No LMP for male patient.  Allergies reviewed: Yes  Medications reviewed: Yes    Medications: Medication refills not needed today.  Pharmacy name entered into [employer]: [id number] [place], [place] - [facility] [address] AT [facility]    Frailty Screening:   Is the patient here for a new oncology consult visit in cancer care? 1. Yes. Over the past month, have you experienced difficulty or required a caregiver to assist with:   1. Balance, walking or general mobility (including any falls)? YES  2. Completion of self-care tasks such as bathing, dressing, toileting, grooming/hygiene?  NO  3. Concentration or memory that affects your daily life?  NO       Clinical concerns:  new consult tongue cancer      Noelle Stahl"

## 2024-03-18 NOTE — LETTER
"    3/18/2024         RE: Donovan Yi  [address] [address]        Dear Colleague,    Thank you for referring your patient, Donovan Yi, to the [facility]. Please see a copy of my visit note below.    Oncology Rooming Note    March 18, 2024 11:10 AM   Donovan Yi is a 73 year old male who presents for:    Chief Complaint   Patient presents with     Oncology Clinic Visit     New consult Malignant neoplasm of base of tongue        Initial Vitals: /60   Pulse 72   Resp 18   Ht 1.778 m (5' 10\")   Wt 82.6 kg (182 lb)   SpO2 98%   BMI 26.11 kg/m   Estimated body mass index is 26.11 kg/m  as calculated from the following:    Height as of this encounter: 1.778 m (5' 10\").    Weight as of this encounter: 82.6 kg (182 lb). Body surface area is 2.02 meters squared.  Mild Pain (3) Comment: Data Unavailable   No LMP for male patient.  Allergies reviewed: Yes  Medications reviewed: Yes    Medications: Medication refills not needed today.  Pharmacy name entered into [employer]: [id number] [place] [address] [address] AT [facility]    Frailty Screening:   Is the patient here for a new oncology consult visit in cancer care? 1. Yes. Over the past month, have you experienced difficulty or required a caregiver to assist with:   1. Balance, walking or general mobility (including any falls)? YES  2. Completion of self-care tasks such as bathing, dressing, toileting, grooming/hygiene?  NO  3. Concentration or memory that affects your daily life?  NO       Clinical concerns:  new consult tongue cancer      Noelle Stahl              [facility] Hematology and Oncology Progress Note    Patient: Donovan Yi  MRN: [id number]  Date of Service: Mar 18, 2024        Assessment and Plan:    1.  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  We reviewed the pathologic " "and radiographic findings.  I personally reviewed his PET scan images.  He has already met with radiation oncology.  He has a simulation later this week.  I reviewed with him the rationale for adding weekly cisplatin to his radiation in terms of increasing his risk of Kear and subsequent lowering of the risk of needing surgery I reviewed some the more common side effects of the regimen which include, but are not limited to, fatigue, decreased stamina, nausea, taste disturbance, peripheral neuropathy, cytopenias, kidney dysfunction, and mucositis.  Will see him in clinic for an evaluation weeks 2, 4, and 6.  Plan 6 treatments.  We talked about IV access.  I think he might benefit from a port as he says he has \"bad veins\".  However he wants to try cycle 1 without a port and see how it goes.  I think this is reasonable.  He was given some written information on cisplatin to take home and review.  Questions were answered.    2.  Right upper lobe groundglass nodule: 4.2 cm.  Minimal FDG uptake.  Repeat CT scan follow-up in 4 months.  Follow radiographically.  If evidence of enlargement may need to biopsy and treated with radiation.  Unlikely to be related to the tongue cancer.    3.  F/E/N: We discussed the possible need for tube feeding to maintain nutrition and hydration.  The patient has a very limited diet which may make the maintenance of nutrition difficult.  We talked about J-tube placement.  We also briefly discussed TPN which I told them with less desirable.  Will have to follow closely.    ECOG Performance  0    Diagnosis:    Base of tongue cancer    Treatment:    None to date    Interim History:    Phani is a 73-year-old gentleman who is referred today for eval ration of tongue cancer.  He initially presented to his primary care provider in mid February of this year with a tongue mass.  Located on the left posterior tongue.  He was referred to ENT.  He was seen on February 28.  At that time he was not having " "any otalgia or tongue numbness.  There was no odynophagia or dysphagia.  No change in his voice.  Biopsy was taken which revealed a p16 positive squamous cell carcinoma.  He then went on to have PET scan and CT scan imaging.  The PET scan showed a hypermetabolic mass at the left tongue base which extends inferiorly into the vallecula.  Measures 2.7 x 2.6 x 4.4 cm.  No evidence of latricia or metastatic disease.  His case [facility].  Concurrent therapy was recommended.  He comes in today to discuss chemotherapy.  He notes that he is having some more pain associated with the tongue mass and also some increased difficulty with swallowing.  No fevers.    Review of Systems:    As above in the history.     Review of Systems otherwise Negative for:  General: chills, fever or night sweats  Psychological: anxiety or depression  Ophthalmic: blurry vision, double vision or loss of vision, vision change  ENT: epistaxis, oral lesions, hearing changes  Hematological and Lymphatic: bleeding, bruising, jaundice, swollen lymph nodes  Endocrine: hot flashes, unexpected weight changes  Respiratory: cough, hemoptysis, orthopnea or shortness of breath/FLETCHER  Cardiovascular: chest pain, edema, palpitations or PND  Gastrointestinal: abdominal pain, blood in stools, change in bowel habits, constipation  Genito-Urinary: change in urinary stream, incontinence, frequency/urgency  Musculoskeletal: joint pain, stiffness, swelling, muscle pain  Neurological: dizziness, headaches, numbness/tingling  Dermatological: lumps and rash    Past History:    Past Medical History:   Diagnosis Date     Food intolerance in adult      Hypertension      Malignant neoplasm of base of tongue (H)      Physical Exam:    /60   Pulse 72   Resp 18   Ht 1.778 m (5' 10\")   Wt 82.6 kg (182 lb)   SpO2 98%   BMI 26.11 kg/m      General: patient appears stated age of 73 year old. Nontoxic and in no distress.   HEENT: Head: " atraumatic, normocephalic. Sclerae anicteric.  Chest:  Normal respiratory effort  Cardiac:  No edema.   Abdomen: abdomen is non-distended  Extremities: normal tone and muscle bulk.  Skin: no lesions or rash on visible skin. Warm and dry.   CNS: alert and oriented. Grossly non-focal.   Psychiatric: normal mood and affect.     Lab Results:    No results found for this or any previous visit (from the past 168 hour(s)).    Imaging:    PET Oncology Whole Body    Result Date: 3/6/2024  EXAM: PET ONCOLOGY WHOLE BODY, CT SOFT TISSUE NECK W CONTRAST, CT CHEST/ABDOMEN/PELVIS W CONTRAST LOCATION: [facility] DATE: 3/6/2024 INDICATION: Initial treatment planning and staging for malignant neoplasm of base of tongue. Tongue base squamous cell carcinoma. COMPARISON: No relevant prior comparison imaging. CONTRAST: 90 mL Isovue-370. TECHNIQUE: Serum glucose level 110 mg/dL. One hour post intravenous administration of 10.9 mCi F-18 FDG, PET imaging was performed from the skull vertex to feet, utilizing attenuation correction with concurrent axial CT and PET/CT image fusion. Separate diagnostic CT of the neck, chest, abdomen, and pelvis was performed. Dose reduction techniques were used. LIMITATIONS: Evaluation of the calvarium is limited by patient motion artifact. PET/CT FINDINGS: Hypermetabolic left base of tongue mass which extends inferiorly into the vallecula compatible with biopsy-proven squamous cell carcinoma. This mass measures up to 2.7 x 2.6 x 4.4 cm with SUV max of 23.8. No evidence of FDG avid latricia or  distant metastatic disease. A right upper lobe some solid opacity measures up to 4.2 cm and demonstrates minimal FDG uptake with SUV max of 1.4. There are additional adjacent smaller groundglass nodule/opacities. CT FINDINGS: No cervical adenopathy. Mild coronary artery calcifications. Nodular opacity in the right upper lobe apex without FDG avidity is favored scarring (series 7, image 16). Right  middle lobe 3 mm subpleural nodule (series 7, image 76) and right lower lobe 3 mm subpleural nodule (series 7, image 79), below the resolution of PET. Cholecystectomy. Mild intrahepatic and extra hepatic biliary ductal dilatation is likely secondary to postcholecystectomy reservoir state. Lobulated contour of both kidneys. Few colonic diverticula. Small duodenal diverticulum. Enlargement of the median lobe of the prostate gland with mass effect on the posterior bladder. Circumferential bladder wall thickening is nonspecific but may be secondary to chronic outlet obstruction. Small fat-containing umbilical hernia. Degenerative changes of the spine.     IMPRESSION: 1.  Hypermetabolic left base of tongue mass extending inferiorly into the vallecula, compatible with biopsy-proven squamous cell carcinoma. No evidence of FDG avid metastatic disease. 2.  Right upper lobe groundglass nodules/opacities with minimal FDG uptake. These may represent infectious foci versus groundglass nodules/adenocarcinoma spectrum lesions. Recommend attention on future oncologic imaging. Additional small solid pulmonary nodules are below the resolution of PET and can be followed on future imaging.    CT Chest/Abdomen/Pelvis w Contrast    Result Date: 3/6/2024  EXAM: PET ONCOLOGY WHOLE BODY, CT SOFT TISSUE NECK W CONTRAST, CT CHEST/ABDOMEN/PELVIS W CONTRAST LOCATION: [facility] DATE: 3/6/2024 INDICATION: Initial treatment planning and staging for malignant neoplasm of base of tongue. Tongue base squamous cell carcinoma. COMPARISON: No relevant prior comparison imaging. CONTRAST: 90 mL Isovue-370. TECHNIQUE: Serum glucose level 110 mg/dL. One hour post intravenous administration of 10.9 mCi F-18 FDG, PET imaging was performed from the skull vertex to feet, utilizing attenuation correction with concurrent axial CT and PET/CT image fusion. Separate diagnostic CT of the neck, chest, abdomen, and pelvis was performed. Dose  reduction techniques were used. LIMITATIONS: Evaluation of the calvarium is limited by patient motion artifact. PET/CT FINDINGS: Hypermetabolic left base of tongue mass which extends inferiorly into the vallecula compatible with biopsy-proven squamous cell carcinoma. This mass measures up to 2.7 x 2.6 x 4.4 cm with SUV max of 23.8. No evidence of FDG avid latricia or  distant metastatic disease. A right upper lobe some solid opacity measures up to 4.2 cm and demonstrates minimal FDG uptake with SUV max of 1.4. There are additional adjacent smaller groundglass nodule/opacities. CT FINDINGS: No cervical adenopathy. Mild coronary artery calcifications. Nodular opacity in the right upper lobe apex without FDG avidity is favored scarring (series 7, image 16). Right middle lobe 3 mm subpleural nodule (series 7, image 76) and right lower lobe 3 mm subpleural nodule (series 7, image 79), below the resolution of PET. Cholecystectomy. Mild intrahepatic and extra hepatic biliary ductal dilatation is likely secondary to postcholecystectomy reservoir state. Lobulated contour of both kidneys. Few colonic diverticula. Small duodenal diverticulum. Enlargement of the median lobe of the prostate gland with mass effect on the posterior bladder. Circumferential bladder wall thickening is nonspecific but may be secondary to chronic outlet obstruction. Small fat-containing umbilical hernia. Degenerative changes of the spine.     IMPRESSION: 1.  Hypermetabolic left base of tongue mass extending inferiorly into the vallecula, compatible with biopsy-proven squamous cell carcinoma. No evidence of FDG avid metastatic disease. 2.  Right upper lobe groundglass nodules/opacities with minimal FDG uptake. These may represent infectious foci versus groundglass nodules/adenocarcinoma spectrum lesions. Recommend attention on future oncologic imaging. Additional small solid pulmonary nodules are below the resolution of PET and can be followed on future  imaging.    CT Soft Tissue Neck w Contrast    Result Date: 3/6/2024  EXAM: PET ONCOLOGY WHOLE BODY, CT SOFT TISSUE NECK W CONTRAST, CT CHEST/ABDOMEN/PELVIS W CONTRAST LOCATION: [facility] DATE: 3/6/2024 INDICATION: Initial treatment planning and staging for malignant neoplasm of base of tongue. Tongue base squamous cell carcinoma. COMPARISON: No relevant prior comparison imaging. CONTRAST: 90 mL Isovue-370. TECHNIQUE: Serum glucose level 110 mg/dL. One hour post intravenous administration of 10.9 mCi F-18 FDG, PET imaging was performed from the skull vertex to feet, utilizing attenuation correction with concurrent axial CT and PET/CT image fusion. Separate diagnostic CT of the neck, chest, abdomen, and pelvis was performed. Dose reduction techniques were used. LIMITATIONS: Evaluation of the calvarium is limited by patient motion artifact. PET/CT FINDINGS: Hypermetabolic left base of tongue mass which extends inferiorly into the vallecula compatible with biopsy-proven squamous cell carcinoma. This mass measures up to 2.7 x 2.6 x 4.4 cm with SUV max of 23.8. No evidence of FDG avid latricia or  distant metastatic disease. A right upper lobe some solid opacity measures up to 4.2 cm and demonstrates minimal FDG uptake with SUV max of 1.4. There are additional adjacent smaller groundglass nodule/opacities. CT FINDINGS: No cervical adenopathy. Mild coronary artery calcifications. Nodular opacity in the right upper lobe apex without FDG avidity is favored scarring (series 7, image 16). Right middle lobe 3 mm subpleural nodule (series 7, image 76) and right lower lobe 3 mm subpleural nodule (series 7, image 79), below the resolution of PET. Cholecystectomy. Mild intrahepatic and extra hepatic biliary ductal dilatation is likely secondary to postcholecystectomy reservoir state. Lobulated contour of both kidneys. Few colonic diverticula. Small duodenal diverticulum. Enlargement of the median lobe of the  prostate gland with mass effect on the posterior bladder. Circumferential bladder wall thickening is nonspecific but may be secondary to chronic outlet obstruction. Small fat-containing umbilical hernia. Degenerative changes of the spine.     IMPRESSION: 1.  Hypermetabolic left base of tongue mass extending inferiorly into the vallecula, compatible with biopsy-proven squamous cell carcinoma. No evidence of FDG avid metastatic disease. 2.  Right upper lobe groundglass nodules/opacities with minimal FDG uptake. These may represent infectious foci versus groundglass nodules/adenocarcinoma spectrum lesions. Recommend attention on future oncologic imaging. Additional small solid pulmonary nodules are below the resolution of PET and can be followed on future imaging.     Signed by: Michael Aldana MD      Again, thank you for allowing me to participate in the care of your patient.        Sincerely,        Michael Aldana MD

## 2024-03-19 ENCOUNTER — TRANSFERRED RECORDS (OUTPATIENT)
Dept: HEALTH INFORMATION MANAGEMENT | Facility: CLINIC | Age: 74
End: 2024-03-19
Payer: MEDICARE

## 2024-03-19 NOTE — ADDENDUM NOTE
Addended by: FAUSTINO PLEITEZ on: 3/18/2024 09:48 PM     Modules accepted: Orders     LOV: 8/12/20 pre-op, 6/19/20 med check  LF zolpidem: 4/29/20 #30 + 5 ref  LF tramadol: 10/16/20 #60  Has pending f/u w/ you for 12/9/20.

## 2024-03-20 ENCOUNTER — TELEPHONE (OUTPATIENT)
Dept: ONCOLOGY | Facility: HOSPITAL | Age: 74
End: 2024-03-20
Payer: MEDICARE

## 2024-03-20 NOTE — TELEPHONE ENCOUNTER
Per Gonzalo WALKER, will see patient virtually tomorrow 3/20 at 9 am. The patient was contacted and is aware of plan and verbalizes agreement.  Paulina Sierra RN
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
## 2024-03-21 ENCOUNTER — DOCUMENTATION ONLY (OUTPATIENT)
Dept: RADIATION ONCOLOGY | Facility: HOSPITAL | Age: 74
End: 2024-03-21

## 2024-03-21 ENCOUNTER — VIRTUAL VISIT (OUTPATIENT)
Dept: ONCOLOGY | Facility: CLINIC | Age: 74
End: 2024-03-21
Attending: PSYCHOLOGIST
Payer: MEDICARE

## 2024-03-21 ENCOUNTER — APPOINTMENT (OUTPATIENT)
Dept: RADIATION ONCOLOGY | Facility: HOSPITAL | Age: 74
End: 2024-03-21
Attending: OTOLARYNGOLOGY
Payer: MEDICARE

## 2024-03-21 DIAGNOSIS — F43.22 ADJUSTMENT DISORDER WITH ANXIETY: Primary | ICD-10-CM

## 2024-03-21 PROCEDURE — 77333 RADIATION TREATMENT AID(S): CPT | Performed by: RADIOLOGY

## 2024-03-21 PROCEDURE — 77334 RADIATION TREATMENT AID(S): CPT | Performed by: RADIOLOGY

## 2024-03-21 PROCEDURE — 77334 RADIATION TREATMENT AID(S): CPT | Mod: 26 | Performed by: RADIOLOGY

## 2024-03-21 PROCEDURE — 77333 RADIATION TREATMENT AID(S): CPT | Mod: 26 | Performed by: RADIOLOGY

## 2024-03-21 PROCEDURE — 90832 PSYTX W PT 30 MINUTES: CPT | Mod: 95 | Performed by: PSYCHOLOGIST
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
## 2024-03-21 NOTE — PROGRESS NOTES
..[facility] wanted to notify medical oncology that Donovan Yi is scheduled to start radiation on Wednesday April 3rd, 2024.  Please call radiation oncology at [facility]'s or [facility]'s if you have further questions.
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
## 2024-03-26 DIAGNOSIS — G47.01 INSOMNIA DUE TO MEDICAL CONDITION: ICD-10-CM

## 2024-03-26 DIAGNOSIS — F41.9 ANXIETY: Primary | ICD-10-CM

## 2024-03-26 RX ORDER — LORAZEPAM 0.5 MG/1
0.5 TABLET ORAL
Qty: 30 TABLET | Refills: 1 | Status: SHIPPED | OUTPATIENT
Start: 2024-03-26 | End: 2024-05-01

## 2024-03-27 ENCOUNTER — PATIENT OUTREACH (OUTPATIENT)
Dept: ONCOLOGY | Facility: HOSPITAL | Age: 74
End: 2024-03-27
Payer: MEDICARE

## 2024-03-27 NOTE — PROGRESS NOTES
[facility]/Voicemail    Clinical Data: Care Coordinator Outreach    Outreach attempted x 1.  Left message on patient's voicemail with call back information and requested return call.    Plan: Care Coordinator will try to reach patient again in 1-2 business days.    Left a message regarding patient's chemo schedule and to go over chemotherapy teaching.    appropriate

## 2024-03-27 NOTE — PROGRESS NOTES
SPEECH LANGUAGE PATHOLOGY EVALUATION    Subjective      Presenting condition or subjective complaint: Pt presents today for baseline swallow assessment prior to starting chemoXRT.   Date of onset: 03/28/24    Relevant medical history: T3N0 p16+ SCC left BOT, food intolerance, HTN    Prior diagnostic imaging/testing results:    no  Prior therapy history for the same diagnosis, illness or injury:   no     Patient goals for therapy: to maintain swallow function during/after chemoXRT    Pain assessment:  endorses soreness with swallowing     Objective     SWALLOW EVALUTION  Dysphagia history: Patient reports a long history of difficulty tolerating foods. He reports he has had many tests done and seen many doctors and no one can figure out why he cannot tolerate foods. States all he eats are homemade pancakes 3x/day and water. He sometimes has two pieces of broccoli and filet. If he has anything else he gets sick. Reports difficulty swallowing small pills as he feels like they are getting stuck. Otherwise, he denies coughing/throat clearing and feeling of stasis with PO intake.     Current Diet/Method of Nutritional Intake: thin liquids (level 0), regular diet, however, pt's diet is very limited d/t difficulty tolerating foods. See above-         CLINICAL SWALLOW EVALUATION  Oral Motor Function:   Dentition: adequate dentition  Secretion management: WFL  Mucosal quality: adequate  Mandibular function: intact  Oral labial function: WFL  Lingual function: WFL  Velar function: elevation decreased on left   Buccal function: intact  Laryngeal function: cough, throat clear, volitional swallow, voicing WFL     Level of assist required for feeding: no assistance needed   Textures Trialed:   Clinical Swallow Eval: Thin Liquids  Mode of presentation: cup, self-fed   Volume presented: 4oz  Preparatory Phase: WFL  Oral Phase: WFL  Pharyngeal phase of swallow: intact   Diagnostic statement: No clinical s/sx of penetration/aspiration.       Video swallow study not completed as patient was concerned about drinking/eating barium. Reviewed ingredients of each item, all of which contained xylitol. Pt reports he cannot tolerate artificial sweeteners and thus preferred to defer video swallow study.     ESOPHAGEAL PHASE OF SWALLOW  no observed or reported concerns related to esophageal function     SWALLOW ASSESSMENT CLINICAL IMPRESSIONS AND RATIONALE  Diet Consistency Recommendations: thin liquids (level 0), regular diet    Recommended Feeding/Eating Techniques: small bolus size, slow rate of intake, alternate food and liquid intake, maintain upright sitting position for eating, maintain upright posture during/after eating for 30 minutes, minimize distractions during oral intake   Medication Administration Recommendations: whole with liquid or in puree/bites of the pancakes he eats  Instrumental Assessment Recommendations: instrumental evaluation not recommended at this time     Assessment & Plan   CLINICAL IMPRESSIONS   Medical Diagnosis: Primary squamous cell carcinoma of the base of tongue    Treatment Diagnosis: Mild oropharyngeal dysphagia expected to worsen to moderately severe during chemoXRT   Impression/Assessment:  Patient presents today with mild oropharyngeal dysphagia expected to worsen to moderately severe during chemoXRT. Dysphagia and expected decline in swallow function during treatment will limit patient's ability to maintain oral intake and receive nutrition. Recommend course of speech therapy to maximize swallow function during and after treatment for oral intake.    PLAN OF CARE  Treatment Interventions: Swallowing dysfunction and/or oral function for feeding    Prognosis to achieve stated therapy goals is fair   Rehab potential is impacted by: current level of function, difficulty tolerating foods    Long Term Goals:   SLP Goal 1  Goal Identifier: PO  Goal Description: 1. Pt will tolerate regular, moist textures and thin liquids  with no overt clinical s/sx of penetration/aspiration in 100% of PO trials.  Rationale: To maximize safety, ease and/or independence of oral intake  Target Date: 06/25/24  SLP Goal 2  Goal Identifier: Exercise  Goal Description: 2. Pt will maximize swallow function by completing 10 reps of 5/5 oropharyngeal and jaw strengthening/ROM exercises daily with 100% accuracy.  Rationale: To maximize safety, ease and/or independence of oral intake  Target Date: 06/25/24      Frequency of Treatment: 4x/month for 3 months then 1-2x month  Duration of Treatment: 12 months     Recommended Referrals to Other Professionals:  Dietician    Education Assessment:   Learner/Method: Patient;Listening;No Barriers to Learning    Risks and benefits of evaluation/treatment have been explained.   Patient/Family/caregiver agrees with Plan of Care.     Evaluation Time:    SLP Eval: oral/pharyngeal swallow function, clinical minutes (37168): 15      Signing Clinician: AILIN Alexandra    [facility]                                                                                   OUTPATIENT SPEECH LANGUAGE PATHOLOGY      PLAN OF TREATMENT FOR OUTPATIENT REHABILITATION   Patient's Last Name, First Name, Donovan Robb YOB: 1950   Provider's Name   [facility]   Medical Record No.  [id number]     Onset Date: 03/28/24 Start of Care Date: 03/28/24     Medical Diagnosis:  Primary squamous cell carcinoma of the base of tongue      SLP Treatment Diagnosis: Mild oropharyngeal dysphagia expected to worsen to moderately severe during chemoXRT  Plan of Treatment  Frequency/Duration: 4x/month for 3 months then 1-2x month  / 12 months     Certification date from 03/28/24   To 06/25/24          See note for plan of treatment details and functional goals     AILIN Alexandra                         I CERTIFY THE NEED FOR THESE SERVICES FURNISHED UNDER        THIS  PLAN OF TREATMENT AND WHILE UNDER MY CARE     (Physician attestation of this document indicates review and certification of the therapy plan).              Referring Provider:  Dr. Albert Pimentel    Initial Assessment  See Epic Evaluation- 03/28/24

## 2024-03-28 ENCOUNTER — PATIENT OUTREACH (OUTPATIENT)
Dept: ONCOLOGY | Facility: HOSPITAL | Age: 74
End: 2024-03-28

## 2024-03-28 ENCOUNTER — THERAPY VISIT (OUTPATIENT)
Dept: SPEECH THERAPY | Facility: CLINIC | Age: 74
End: 2024-03-28
Payer: MEDICARE

## 2024-03-28 DIAGNOSIS — R13.12 OROPHARYNGEAL DYSPHAGIA: Primary | ICD-10-CM

## 2024-03-28 DIAGNOSIS — C01 PRIMARY SQUAMOUS CELL CARCINOMA OF BASE OF TONGUE (H): ICD-10-CM

## 2024-03-28 PROCEDURE — 92610 EVALUATE SWALLOWING FUNCTION: CPT | Mod: GN | Performed by: SPEECH-LANGUAGE PATHOLOGIST

## 2024-03-28 PROCEDURE — 92526 ORAL FUNCTION THERAPY: CPT | Mod: GN | Performed by: SPEECH-LANGUAGE PATHOLOGIST

## 2024-03-28 NOTE — PROGRESS NOTES
[facility]: Cancer Care Plan of Care Education Note                                    Discussion with Patient:                                                      Discussed chemotherapy education with the patient. Reviewed upcoming chemotherapy appointment schedule with the patient. Patient stated that he has been having some recent hypertension issues. I stated that he should reach out to his primary care provider to get on short term or potential long term hypertension medication control. Patient stated that he thought Dr. Aldana would manage his high blood pressure medications and I stated that as a specialty clinic, we manage all things that are related to oncology and hypertension management will need to be managed by primary care. Patient verbalized understanding.      Reviewed antiemetic and steroid use.     Goals          General     Functional (pt-stated)             Assessment:                                                      Assessment completed with:: Patient    Plan of Care Education   Yearly learning assessment completed?: No  Does patient understand diagnosis?: Yes  Does patient understand treatment plan/regimen?: Yes  Preparing for treatment:: Reviewed treatment preparation information with patient (vascular access, day of chemo, visitor policy, what to bring, etc.)  Vascular access education provided for:: Peripheral IV  Side effect education:: Diarrhea/Constipation;Fatigue;Immune-mediated effects;Infection;Lab value monitoring (anemia, neutropenia, thrombocytopenia);Skin changes;Urinary;Mylosuppression;Nausea/Vomiting  Safety/self care at home reviewed with patient:: Yes  Coping - concerns/fears reviewed with patient:: Yes  Plan of Care:: Lab appointment;MD follow-up appointment;Treatment schedule  When to call provider:: Bleeding;Increased shortness of breath;New/worsening pain;Shaking chills;Temperature >100.4F;Uncontrolled diarrhea/constipation;Uncontrolled nausea/vomiting  Reasons  for deferring treatment reviewed with patient:: Yes  Additional education provided for: : Steroid therapy;Neutropenic precautions;Bleeding precautions    Evaluation of Learning  Patient Education Provided: Yes  Readiness:: Acceptance  Method:: Literature;Explanation  Response:: Verbalizes understanding        Intervention/Education provided during outreach:                                                         Follow up call in 1-2 weeks  Patient to follow up as scheduled at next appt  Patient to call/IQumulushart message with updates  Confirmed patient has clinic and triage numbers    Signature:  Sindy Ortiz RN

## 2024-03-28 NOTE — PROGRESS NOTES
[facility]: Cancer Care                                                                                          Called patient to go over chemotherapy teaching and talk about upcoming chemotherapy infusion appt schedule. Patient stated he was about to get a swallow study test done and that he would give me a call back before 4:00pm today.     Signature:  Sindy Ortiz RN

## 2024-03-29 ENCOUNTER — TELEPHONE (OUTPATIENT)
Dept: SPEECH THERAPY | Facility: CLINIC | Age: 74
End: 2024-03-29
Payer: MEDICARE

## 2024-04-01 ENCOUNTER — APPOINTMENT (OUTPATIENT)
Dept: RADIATION ONCOLOGY | Facility: CLINIC | Age: 74
End: 2024-04-01
Attending: RADIOLOGY
Payer: MEDICARE

## 2024-04-01 DIAGNOSIS — F41.8 SITUATIONAL ANXIETY: Primary | ICD-10-CM

## 2024-04-01 PROCEDURE — 77301 RADIOTHERAPY DOSE PLAN IMRT: CPT | Mod: 26 | Performed by: RADIOLOGY

## 2024-04-01 PROCEDURE — 77338 DESIGN MLC DEVICE FOR IMRT: CPT | Performed by: RADIOLOGY

## 2024-04-01 PROCEDURE — 77300 RADIATION THERAPY DOSE PLAN: CPT | Performed by: RADIOLOGY

## 2024-04-01 PROCEDURE — 77301 RADIOTHERAPY DOSE PLAN IMRT: CPT | Performed by: RADIOLOGY

## 2024-04-01 PROCEDURE — 77300 RADIATION THERAPY DOSE PLAN: CPT | Mod: 26 | Performed by: RADIOLOGY

## 2024-04-01 PROCEDURE — 77338 DESIGN MLC DEVICE FOR IMRT: CPT | Mod: 26 | Performed by: RADIOLOGY

## 2024-04-01 RX ORDER — DIAZEPAM 5 MG
5-10 TABLET ORAL EVERY 12 HOURS PRN
Qty: 20 TABLET | Refills: 1 | Status: SHIPPED | OUTPATIENT
Start: 2024-04-01 | End: 2024-07-26

## 2024-04-02 ENCOUNTER — TELEPHONE (OUTPATIENT)
Dept: ONCOLOGY | Facility: HOSPITAL | Age: 74
End: 2024-04-02
Payer: MEDICARE

## 2024-04-02 ENCOUNTER — TELEPHONE (OUTPATIENT)
Dept: SPEECH THERAPY | Facility: CLINIC | Age: 74
End: 2024-04-02
Payer: MEDICARE

## 2024-04-02 NOTE — TELEPHONE ENCOUNTER
Prior Authorization Approval    Medication: DIAZEPAM 5 MG PO TABS  Authorization Effective Date: 4/2/2024  Authorization Expiration Date: 5/2/2024  Approved Dose/Quantity: 20/5days  Reference #: [id number]   Insurance Company: [employer] - Phone [phone] Fax [phone]    Which Pharmacy is filling the prescription: [facility] [address] [place] [address] JUAN PÉREZ [facility]  Pharmacy Notified: Yes  Patient Notified: Yes

## 2024-04-03 ENCOUNTER — LAB (OUTPATIENT)
Dept: INFUSION THERAPY | Facility: HOSPITAL | Age: 74
End: 2024-04-03
Attending: INTERNAL MEDICINE
Payer: MEDICARE

## 2024-04-03 ENCOUNTER — OFFICE VISIT (OUTPATIENT)
Dept: RADIATION ONCOLOGY | Facility: CLINIC | Age: 74
End: 2024-04-03
Attending: RADIOLOGY
Payer: MEDICARE

## 2024-04-03 VITALS
OXYGEN SATURATION: 99 % | DIASTOLIC BLOOD PRESSURE: 82 MMHG | WEIGHT: 181.7 LBS | SYSTOLIC BLOOD PRESSURE: 184 MMHG | RESPIRATION RATE: 16 BRPM | HEART RATE: 73 BPM | BODY MASS INDEX: 26.07 KG/M2 | TEMPERATURE: 97.9 F

## 2024-04-03 DIAGNOSIS — C01 PRIMARY SQUAMOUS CELL CARCINOMA OF BASE OF TONGUE (H): Primary | ICD-10-CM

## 2024-04-03 DIAGNOSIS — C01 MALIGNANT NEOPLASM OF BASE OF TONGUE (H): Primary | ICD-10-CM

## 2024-04-03 LAB
ALBUMIN SERPL BCG-MCNC: 4.4 G/DL (ref 3.5–5.2)
ALP SERPL-CCNC: 73 U/L (ref 40–150)
ALT SERPL W P-5'-P-CCNC: 13 U/L (ref 0–70)
ANION GAP SERPL CALCULATED.3IONS-SCNC: 10 MMOL/L (ref 7–15)
AST SERPL W P-5'-P-CCNC: 21 U/L (ref 0–45)
BASOPHILS # BLD AUTO: 0.1 10E3/UL (ref 0–0.2)
BASOPHILS NFR BLD AUTO: 1 %
BILIRUB SERPL-MCNC: 0.6 MG/DL
BUN SERPL-MCNC: 17 MG/DL (ref 8–23)
CALCIUM SERPL-MCNC: 9.4 MG/DL (ref 8.8–10.2)
CHLORIDE SERPL-SCNC: 102 MMOL/L (ref 98–107)
CREAT SERPL-MCNC: 0.89 MG/DL (ref 0.67–1.17)
DEPRECATED HCO3 PLAS-SCNC: 30 MMOL/L (ref 22–29)
EGFRCR SERPLBLD CKD-EPI 2021: 89 ML/MIN/1.73M2
EOSINOPHIL # BLD AUTO: 0.3 10E3/UL (ref 0–0.7)
EOSINOPHIL NFR BLD AUTO: 4 %
ERYTHROCYTE [DISTWIDTH] IN BLOOD BY AUTOMATED COUNT: 13 % (ref 10–15)
GLUCOSE SERPL-MCNC: 101 MG/DL (ref 70–99)
HCT VFR BLD AUTO: 41.1 % (ref 40–53)
HGB BLD-MCNC: 13.8 G/DL (ref 13.3–17.7)
IMM GRANULOCYTES # BLD: 0 10E3/UL
IMM GRANULOCYTES NFR BLD: 0 %
LYMPHOCYTES # BLD AUTO: 1.2 10E3/UL (ref 0.8–5.3)
LYMPHOCYTES NFR BLD AUTO: 18 %
MAGNESIUM SERPL-MCNC: 2 MG/DL (ref 1.7–2.3)
MCH RBC QN AUTO: 30.1 PG (ref 26.5–33)
MCHC RBC AUTO-ENTMCNC: 33.6 G/DL (ref 31.5–36.5)
MCV RBC AUTO: 90 FL (ref 78–100)
MONOCYTES # BLD AUTO: 0.7 10E3/UL (ref 0–1.3)
MONOCYTES NFR BLD AUTO: 11 %
NEUTROPHILS # BLD AUTO: 4.2 10E3/UL (ref 1.6–8.3)
NEUTROPHILS NFR BLD AUTO: 65 %
NRBC # BLD AUTO: 0 10E3/UL
NRBC BLD AUTO-RTO: 0 /100
PLATELET # BLD AUTO: 248 10E3/UL (ref 150–450)
POTASSIUM SERPL-SCNC: 3.6 MMOL/L (ref 3.4–5.3)
PROT SERPL-MCNC: 7.4 G/DL (ref 6.4–8.3)
RBC # BLD AUTO: 4.59 10E6/UL (ref 4.4–5.9)
SODIUM SERPL-SCNC: 142 MMOL/L (ref 135–145)
WBC # BLD AUTO: 6.5 10E3/UL (ref 4–11)

## 2024-04-03 PROCEDURE — 258N000003 HC RX IP 258 OP 636: Performed by: INTERNAL MEDICINE

## 2024-04-03 PROCEDURE — 36415 COLL VENOUS BLD VENIPUNCTURE: CPT | Performed by: INTERNAL MEDICINE

## 2024-04-03 PROCEDURE — 96375 TX/PRO/DX INJ NEW DRUG ADDON: CPT

## 2024-04-03 PROCEDURE — 85049 AUTOMATED PLATELET COUNT: CPT | Performed by: INTERNAL MEDICINE

## 2024-04-03 PROCEDURE — 80053 COMPREHEN METABOLIC PANEL: CPT | Performed by: INTERNAL MEDICINE

## 2024-04-03 PROCEDURE — 250N000011 HC RX IP 250 OP 636: Performed by: INTERNAL MEDICINE

## 2024-04-03 PROCEDURE — 77386 HC IMRT TREATMENT DELIVERY, COMPLEX: CPT | Performed by: RADIOLOGY

## 2024-04-03 PROCEDURE — 77014 PR CT GUIDE FOR PLACEMENT RADIATION THERAPY FIELDS: CPT | Mod: 26 | Performed by: RADIOLOGY

## 2024-04-03 PROCEDURE — 96367 TX/PROPH/DG ADDL SEQ IV INF: CPT

## 2024-04-03 PROCEDURE — 83735 ASSAY OF MAGNESIUM: CPT | Performed by: INTERNAL MEDICINE

## 2024-04-03 PROCEDURE — 96413 CHEMO IV INFUSION 1 HR: CPT

## 2024-04-03 RX ORDER — EPINEPHRINE 1 MG/ML
0.3 INJECTION, SOLUTION INTRAMUSCULAR; SUBCUTANEOUS EVERY 5 MIN PRN
Status: DISCONTINUED | OUTPATIENT
Start: 2024-04-03 | End: 2024-04-03 | Stop reason: HOSPADM

## 2024-04-03 RX ORDER — SODIUM FLUORIDE 5 MG/G
GEL, DENTIFRICE DENTAL
COMMUNITY
Start: 2024-03-31

## 2024-04-03 RX ORDER — ALBUTEROL SULFATE 90 UG/1
1-2 AEROSOL, METERED RESPIRATORY (INHALATION)
Status: DISCONTINUED | OUTPATIENT
Start: 2024-04-03 | End: 2024-04-03 | Stop reason: HOSPADM

## 2024-04-03 RX ORDER — PALONOSETRON 0.05 MG/ML
0.25 INJECTION, SOLUTION INTRAVENOUS ONCE
Qty: 5 ML | Refills: 0 | Status: COMPLETED | OUTPATIENT
Start: 2024-04-03 | End: 2024-04-03

## 2024-04-03 RX ORDER — MEPERIDINE HYDROCHLORIDE 50 MG/ML
25 INJECTION INTRAMUSCULAR; INTRAVENOUS; SUBCUTANEOUS EVERY 30 MIN PRN
Status: DISCONTINUED | OUTPATIENT
Start: 2024-04-03 | End: 2024-04-03 | Stop reason: HOSPADM

## 2024-04-03 RX ORDER — DIPHENHYDRAMINE HYDROCHLORIDE 50 MG/ML
50 INJECTION INTRAMUSCULAR; INTRAVENOUS
Status: DISCONTINUED | OUTPATIENT
Start: 2024-04-03 | End: 2024-04-03 | Stop reason: HOSPADM

## 2024-04-03 RX ORDER — ALBUTEROL SULFATE 0.83 MG/ML
2.5 SOLUTION RESPIRATORY (INHALATION)
Status: DISCONTINUED | OUTPATIENT
Start: 2024-04-03 | End: 2024-04-03 | Stop reason: HOSPADM

## 2024-04-03 RX ORDER — METHYLPREDNISOLONE SODIUM SUCCINATE 125 MG/2ML
125 INJECTION, POWDER, LYOPHILIZED, FOR SOLUTION INTRAMUSCULAR; INTRAVENOUS
Status: DISCONTINUED | OUTPATIENT
Start: 2024-04-03 | End: 2024-04-03 | Stop reason: HOSPADM

## 2024-04-03 RX ADMIN — SODIUM CHLORIDE 81 MG: 9 INJECTION, SOLUTION INTRAVENOUS at 14:33

## 2024-04-03 RX ADMIN — SODIUM CHLORIDE 1000 ML: 9 INJECTION, SOLUTION INTRAVENOUS at 13:20

## 2024-04-03 RX ADMIN — FOSAPREPITANT: 150 INJECTION, POWDER, LYOPHILIZED, FOR SOLUTION INTRAVENOUS at 13:25

## 2024-04-03 RX ADMIN — PALONOSETRON 0.25 MG: 0.05 INJECTION, SOLUTION INTRAVENOUS at 13:20

## 2024-04-03 ASSESSMENT — PAIN SCALES - GENERAL: PAINLEVEL: MODERATE PAIN (4)

## 2024-04-03 NOTE — PROGRESS NOTES
RADIATION ONCOLOGY WEEKLY TREATMENT VISIT NOTE      Assessment / Impression       Visit Dx:  (C01) Malignant neoplasm of base of tongue (H)  (primary encounter diagnosis)  Plan: magic mouthwash suspension (diphenhydrAMINE,         lidocaine, aluminum-magnesium & simethicone)    Tolerating radiation therapy well.  All questions and concerns addressed.    Plan:     Continue radiation treatment as prescribed.    Patient still have significant oral pain since tooth removal.  I will prescribe Magic mouthwash for symptom control.    Discussed with patient about appropriate nutritional support during the therapy.    Subjective:      HPI: Donovan Yi is a 73 year old male with  Malignant neoplasm of base of tongue (H) [C01]    The following portions of the patient's history were reviewed and updated as appropriate: allergies, current medications, past family history, past medical history, past social history, past surgical history and problem list.    Assessment                  Body Site:  Head and Neck Site: BOT  Stereotactic Radiosurgery: No  Concurrent Therapy: Yes (weekly cisplatin)  Today's Dose: 200  Total Dose for Head and Neck: 7000  Today's Fraction/Total Fraction Head and Neck:   Mucositis due to radiation: 0: None  Thrush: 0: Absent  Pharynx and Esphogaus: 1: Tolerates regular diet  Voice Chances/Stridor/Larynx: 0: Normal  Ocular/Visual - other : 0: Normal  Middle ear/hearin: Normal  Tinnitus: 0: No  Cough: 0: Absent                                   Sexuality Alteration                    Emotional Alteration    Copin: Ineffective  Comfort Alteration   KPS: 90% Can perform normal activity, minor signs of disease  Fatigue (ONS scale): 0: No Fatigue  Pain Location: side of tongue  Pain Intensity. Rate degree of pain ranging from 0 (no pain) to 10 (severe pain): 0-4   Nutrition Alteration   Anorexia: 0: None  Nausea: 0: None  Vomitin: None  Weight: 82.4 kg (181 lb 11.2  oz)  Pharynx and Esphogaus: 1: Tolerates regular diet  Skin Alteration   Skin Sensation: 0: No problem  Skin Reaction: 0: None (radiaplex given with verbal/written instructions)  AUA Assessment                                           Accompanied by       Objective:     Exam: no Erythema.    Vitals:    04/03/24 1238   BP: (!) 184/82   Pulse: 73   Resp: 16   Temp: 97.9  F (36.6  C)   TempSrc: Oral   SpO2: 99%   Weight: 82.4 kg (181 lb 11.2 oz)   PainSc: Moderate Pain (4)   PainLoc: Other - see comment       Wt Readings from Last 8 Encounters:   04/03/24 82.4 kg (181 lb 11.2 oz)   03/18/24 82.6 kg (182 lb)   03/15/24 83 kg (182 lb 14.4 oz)   02/28/24 83.3 kg (183 lb 11.2 oz)   02/14/24 83.1 kg (183 lb 3.2 oz)   06/13/23 79.4 kg (175 lb 1.6 oz)   05/23/23 80.5 kg (177 lb 8 oz)   05/18/23 81.2 kg (179 lb)       General: Alert and oriented, in no acute distress  Christopher has no Erythema.  Aria chart and setup information reviewed    Joyce Yang MD

## 2024-04-03 NOTE — LETTER
4/3/2024         RE: Donovan Yi  [address] [address]        Dear Colleague,    Thank you for referring your patient, Donovan Yi, to the [facility] RADIATION ONCOLOGY [place]. Please see a copy of my visit note below.    RADIATION ONCOLOGY WEEKLY TREATMENT VISIT NOTE      Assessment / Impression       Visit Dx:  (C01) Malignant neoplasm of base of tongue (H)  (primary encounter diagnosis)  Plan: magic mouthwash suspension (diphenhydrAMINE,         lidocaine, aluminum-magnesium & simethicone)    Tolerating radiation therapy well.  All questions and concerns addressed.    Plan:     Continue radiation treatment as prescribed.    Patient still have significant oral pain since tooth removal.  I will prescribe Magic mouthwash for symptom control.    Discussed with patient about appropriate nutritional support during the therapy.    Subjective:      HPI: Donovan Yi is a 73 year old male with  Malignant neoplasm of base of tongue (H) [C01]    The following portions of the patient's history were reviewed and updated as appropriate: allergies, current medications, past family history, past medical history, past social history, past surgical history and problem list.    Assessment                  Body Site:  Head and Neck Site: BOT  Stereotactic Radiosurgery: No  Concurrent Therapy: Yes (weekly cisplatin)  Today's Dose: 200  Total Dose for Head and Neck: 7000  Today's Fraction/Total Fraction Head and Neck:   Mucositis due to radiation: 0: None  Thrush: 0: Absent  Pharynx and Esphogaus: 1: Tolerates regular diet  Voice Chances/Stridor/Larynx: 0: Normal  Ocular/Visual - other : 0: Normal  Middle ear/hearin: Normal  Tinnitus: 0: No  Cough: 0: Absent                                   Sexuality Alteration                    Emotional Alteration    Copin: Ineffective  Comfort Alteration   KPS: 90% Can perform normal activity, minor signs of  disease  Fatigue (ONS scale): 0: No Fatigue  Pain Location: side of tongue  Pain Intensity. Rate degree of pain ranging from 0 (no pain) to 10 (severe pain): 0-4   Nutrition Alteration   Anorexia: 0: None  Nausea: 0: None  Vomitin: None  Weight: 82.4 kg (181 lb 11.2 oz)  Pharynx and Esphogaus: 1: Tolerates regular diet  Skin Alteration   Skin Sensation: 0: No problem  Skin Reaction: 0: None (radiaplex given with verbal/written instructions)  AUA Assessment                                           Accompanied by       Objective:     Exam: no Erythema.    Vitals:    24 1238   BP: (!) 184/82   Pulse: 73   Resp: 16   Temp: 97.9  F (36.6  C)   TempSrc: Oral   SpO2: 99%   Weight: 82.4 kg (181 lb 11.2 oz)   PainSc: Moderate Pain (4)   PainLoc: Other - see comment       Wt Readings from Last 8 Encounters:   24 82.4 kg (181 lb 11.2 oz)   24 82.6 kg (182 lb)   03/15/24 83 kg (182 lb 14.4 oz)   24 83.3 kg (183 lb 11.2 oz)   24 83.1 kg (183 lb 3.2 oz)   23 79.4 kg (175 lb 1.6 oz)   23 80.5 kg (177 lb 8 oz)   23 81.2 kg (179 lb)       General: Alert and oriented, in no acute distress  Christopher has no Erythema.  Aria chart and setup information reviewed    Joyce Yang MD      Again, thank you for allowing me to participate in the care of your patient.        Sincerely,        Joyce Yang MD

## 2024-04-03 NOTE — PROGRESS NOTES
Infusion Nursing Note:  Donovan Yi presents today for D1C1 weekly cisplatin.    Patient seen by provider today: No   present during visit today: Not Applicable.    Note: Reviewed plan of care. Reviewed chemo class information and folder with pt and spouse. Pt premedicated with IV Aloix and Emend/dex. Reviewed dex and antiemetics use at home.   Pt states he has only eaten pancakes for the last 20+ years d/t food intolerances; offered meeting with dietician but pt declined. Pt did take [employer] protein samples for home and ate a granadicate timeads bar in clinic.  1L NS infused to counter known nephrotoxicity of cisplatin.      Intravenous Access:  Labs drawn and peripheral IV placed by [facility]    Treatment Conditions:  Lab Results   Component Value Date    HGB 13.8 04/03/2024    WBC 6.5 04/03/2024    ANEU 4.3 01/16/2006    ANEUTAUTO 4.2 04/03/2024     04/03/2024        Lab Results   Component Value Date     04/03/2024    POTASSIUM 3.6 04/03/2024    MAG 2.0 04/03/2024    CR 0.89 04/03/2024    EMILY 9.4 04/03/2024    BILITOTAL 0.6 04/03/2024    ALBUMIN 4.4 04/03/2024    ALT 13 04/03/2024    AST 21 04/03/2024       Results reviewed, labs MET treatment parameters, ok to proceed with treatment.      Post Infusion Assessment:  Patient tolerated infusion without incident.  Blood return noted pre and post infusion.  Site patent and intact, free from redness, edema or discomfort.  No evidence of extravasations.  Access discontinued per protocol.         Discharge Plan:   Discharge instructions including whom and when to call reviewed with: Patient and spouse, Karla.  Patient discharged in stable condition accompanied by: self.  Departure Mode: Ambulatory.      Danni Dominguez RN

## 2024-04-04 ENCOUNTER — APPOINTMENT (OUTPATIENT)
Dept: RADIATION ONCOLOGY | Facility: CLINIC | Age: 74
End: 2024-04-04
Attending: RADIOLOGY
Payer: MEDICARE

## 2024-04-04 PROCEDURE — 77386 HC IMRT TREATMENT DELIVERY, COMPLEX: CPT | Performed by: STUDENT IN AN ORGANIZED HEALTH CARE EDUCATION/TRAINING PROGRAM

## 2024-04-04 PROCEDURE — 77014 PR CT GUIDE FOR PLACEMENT RADIATION THERAPY FIELDS: CPT | Mod: 26 | Performed by: STUDENT IN AN ORGANIZED HEALTH CARE EDUCATION/TRAINING PROGRAM

## 2024-04-05 ENCOUNTER — APPOINTMENT (OUTPATIENT)
Dept: RADIATION ONCOLOGY | Facility: CLINIC | Age: 74
End: 2024-04-05
Attending: RADIOLOGY
Payer: MEDICARE

## 2024-04-05 ENCOUNTER — PATIENT OUTREACH (OUTPATIENT)
Dept: ONCOLOGY | Facility: HOSPITAL | Age: 74
End: 2024-04-05
Payer: MEDICARE

## 2024-04-05 PROCEDURE — 77386 HC IMRT TREATMENT DELIVERY, COMPLEX: CPT | Performed by: STUDENT IN AN ORGANIZED HEALTH CARE EDUCATION/TRAINING PROGRAM

## 2024-04-05 PROCEDURE — 77014 PR CT GUIDE FOR PLACEMENT RADIATION THERAPY FIELDS: CPT | Mod: 26 | Performed by: STUDENT IN AN ORGANIZED HEALTH CARE EDUCATION/TRAINING PROGRAM

## 2024-04-05 NOTE — PROGRESS NOTES
[facility]/Voicemail    Clinical Data: Care Coordinator Outreach    Outreach attempted x 1.  Left message on patient's voicemail with call back information and requested return call.    Plan: Care Coordinator will try to reach patient again in 1-2 business days.    Left a message regarding assessing patient's headache and addressing his mychart message.

## 2024-04-08 ENCOUNTER — ALLIED HEALTH/NURSE VISIT (OUTPATIENT)
Dept: RADIATION ONCOLOGY | Facility: CLINIC | Age: 74
End: 2024-04-08
Attending: RADIOLOGY
Payer: MEDICARE

## 2024-04-08 DIAGNOSIS — C01 MALIGNANT NEOPLASM OF BASE OF TONGUE (H): Primary | ICD-10-CM

## 2024-04-08 NOTE — PROGRESS NOTES
"Patient here for daily radiation therapy treatment for his BOT cancer.  Patient states he had a bad night last night with difficulty sleeping, worried about nutrition, feeling weak today and states he was up often to urinate.    Patient talked about feeling he needed to get more nutrition and that he felt better after drinking a bottle of \"[employer]\".  Suggested patient try to drink more of the [employer] today and going forward if swallowing was difficult.  Patient denied swallowing problems but did take additional samples when offered.  Wife plans to order more supplements for patient.  Patient and wife talked about getting a port placed and TPN.  Patient wants to get all nutrition thru an IV instead of a feeding tube.  Also stated they would talk to the doctor about something to help him sleep.    Patient states he was unable to lie flat today for his radiation treatment stating he hadn't gotten up early enough to let his sinuses drain and couldn't lie flat.  Patient talked about his claustrophobia and didn't feel he \"could do it\" when lying on the table.  Asked patient if he wanted to go home and rest upright in a recliner for a while and come back later today to try again after his sinuses drained.  Patient decided to not get treatment today and will try again tomorrow.    Dr. Yang informed of above.  Patient continues to be very anxious and has difficulty getting thru radiation treatments despite use of antianxiety medication and modification of patient set up to help him get thru treatment.  "

## 2024-04-09 ENCOUNTER — APPOINTMENT (OUTPATIENT)
Dept: RADIATION ONCOLOGY | Facility: CLINIC | Age: 74
End: 2024-04-09
Attending: RADIOLOGY
Payer: MEDICARE

## 2024-04-09 DIAGNOSIS — I10 ESSENTIAL HYPERTENSION: ICD-10-CM

## 2024-04-09 PROCEDURE — 77014 PR CT GUIDE FOR PLACEMENT RADIATION THERAPY FIELDS: CPT | Mod: 26 | Performed by: STUDENT IN AN ORGANIZED HEALTH CARE EDUCATION/TRAINING PROGRAM

## 2024-04-09 PROCEDURE — 77386 HC IMRT TREATMENT DELIVERY, COMPLEX: CPT | Performed by: STUDENT IN AN ORGANIZED HEALTH CARE EDUCATION/TRAINING PROGRAM

## 2024-04-10 ENCOUNTER — LAB (OUTPATIENT)
Dept: INFUSION THERAPY | Facility: HOSPITAL | Age: 74
End: 2024-04-10
Attending: INTERNAL MEDICINE
Payer: MEDICARE

## 2024-04-10 ENCOUNTER — ONCOLOGY VISIT (OUTPATIENT)
Dept: ONCOLOGY | Facility: HOSPITAL | Age: 74
End: 2024-04-10
Attending: INTERNAL MEDICINE
Payer: MEDICARE

## 2024-04-10 ENCOUNTER — APPOINTMENT (OUTPATIENT)
Dept: RADIATION ONCOLOGY | Facility: CLINIC | Age: 74
End: 2024-04-10
Attending: RADIOLOGY
Payer: MEDICARE

## 2024-04-10 ENCOUNTER — PATIENT OUTREACH (OUTPATIENT)
Dept: ONCOLOGY | Facility: CLINIC | Age: 74
End: 2024-04-10

## 2024-04-10 VITALS
WEIGHT: 182 LBS | HEART RATE: 82 BPM | TEMPERATURE: 98.2 F | RESPIRATION RATE: 18 BRPM | BODY MASS INDEX: 26.05 KG/M2 | DIASTOLIC BLOOD PRESSURE: 70 MMHG | SYSTOLIC BLOOD PRESSURE: 180 MMHG | HEIGHT: 70 IN | OXYGEN SATURATION: 96 %

## 2024-04-10 VITALS
WEIGHT: 181.4 LBS | DIASTOLIC BLOOD PRESSURE: 85 MMHG | TEMPERATURE: 97.8 F | SYSTOLIC BLOOD PRESSURE: 193 MMHG | OXYGEN SATURATION: 99 % | BODY MASS INDEX: 26.03 KG/M2 | RESPIRATION RATE: 16 BRPM | HEART RATE: 75 BPM

## 2024-04-10 DIAGNOSIS — C01 PRIMARY SQUAMOUS CELL CARCINOMA OF BASE OF TONGUE (H): Primary | ICD-10-CM

## 2024-04-10 DIAGNOSIS — C01 MALIGNANT NEOPLASM OF BASE OF TONGUE (H): Primary | ICD-10-CM

## 2024-04-10 LAB
ALBUMIN SERPL BCG-MCNC: 4.1 G/DL (ref 3.5–5.2)
ALP SERPL-CCNC: 69 U/L (ref 40–150)
ALT SERPL W P-5'-P-CCNC: 29 U/L (ref 0–70)
ANION GAP SERPL CALCULATED.3IONS-SCNC: 10 MMOL/L (ref 7–15)
AST SERPL W P-5'-P-CCNC: 26 U/L (ref 0–45)
BASOPHILS # BLD AUTO: 0 10E3/UL (ref 0–0.2)
BASOPHILS NFR BLD AUTO: 0 %
BILIRUB SERPL-MCNC: 0.5 MG/DL
BUN SERPL-MCNC: 23.2 MG/DL (ref 8–23)
CALCIUM SERPL-MCNC: 9 MG/DL (ref 8.8–10.2)
CHLORIDE SERPL-SCNC: 104 MMOL/L (ref 98–107)
CREAT SERPL-MCNC: 0.75 MG/DL (ref 0.67–1.17)
DEPRECATED HCO3 PLAS-SCNC: 27 MMOL/L (ref 22–29)
EGFRCR SERPLBLD CKD-EPI 2021: >90 ML/MIN/1.73M2
EOSINOPHIL # BLD AUTO: 0.3 10E3/UL (ref 0–0.7)
EOSINOPHIL NFR BLD AUTO: 5 %
ERYTHROCYTE [DISTWIDTH] IN BLOOD BY AUTOMATED COUNT: 12.8 % (ref 10–15)
GLUCOSE SERPL-MCNC: 166 MG/DL (ref 70–99)
HCT VFR BLD AUTO: 39.6 % (ref 40–53)
HGB BLD-MCNC: 13.3 G/DL (ref 13.3–17.7)
IMM GRANULOCYTES # BLD: 0 10E3/UL
IMM GRANULOCYTES NFR BLD: 1 %
LYMPHOCYTES # BLD AUTO: 0.9 10E3/UL (ref 0.8–5.3)
LYMPHOCYTES NFR BLD AUTO: 15 %
MAGNESIUM SERPL-MCNC: 1.9 MG/DL (ref 1.7–2.3)
MCH RBC QN AUTO: 29.7 PG (ref 26.5–33)
MCHC RBC AUTO-ENTMCNC: 33.6 G/DL (ref 31.5–36.5)
MCV RBC AUTO: 88 FL (ref 78–100)
MONOCYTES # BLD AUTO: 0.5 10E3/UL (ref 0–1.3)
MONOCYTES NFR BLD AUTO: 8 %
NEUTROPHILS # BLD AUTO: 4.3 10E3/UL (ref 1.6–8.3)
NEUTROPHILS NFR BLD AUTO: 71 %
NRBC # BLD AUTO: 0 10E3/UL
NRBC BLD AUTO-RTO: 0 /100
PLATELET # BLD AUTO: 231 10E3/UL (ref 150–450)
POTASSIUM SERPL-SCNC: 4 MMOL/L (ref 3.4–5.3)
PROT SERPL-MCNC: 6.9 G/DL (ref 6.4–8.3)
RBC # BLD AUTO: 4.48 10E6/UL (ref 4.4–5.9)
SODIUM SERPL-SCNC: 141 MMOL/L (ref 135–145)
WBC # BLD AUTO: 6.1 10E3/UL (ref 4–11)

## 2024-04-10 PROCEDURE — 99214 OFFICE O/P EST MOD 30 MIN: CPT | Performed by: INTERNAL MEDICINE

## 2024-04-10 PROCEDURE — 77336 RADIATION PHYSICS CONSULT: CPT | Performed by: RADIOLOGY

## 2024-04-10 PROCEDURE — 96367 TX/PROPH/DG ADDL SEQ IV INF: CPT

## 2024-04-10 PROCEDURE — 250N000011 HC RX IP 250 OP 636: Performed by: INTERNAL MEDICINE

## 2024-04-10 PROCEDURE — 77386 HC IMRT TREATMENT DELIVERY, COMPLEX: CPT | Performed by: RADIOLOGY

## 2024-04-10 PROCEDURE — 82374 ASSAY BLOOD CARBON DIOXIDE: CPT | Performed by: INTERNAL MEDICINE

## 2024-04-10 PROCEDURE — 83735 ASSAY OF MAGNESIUM: CPT | Performed by: INTERNAL MEDICINE

## 2024-04-10 PROCEDURE — 36415 COLL VENOUS BLD VENIPUNCTURE: CPT | Performed by: INTERNAL MEDICINE

## 2024-04-10 PROCEDURE — 96375 TX/PRO/DX INJ NEW DRUG ADDON: CPT

## 2024-04-10 PROCEDURE — 96413 CHEMO IV INFUSION 1 HR: CPT

## 2024-04-10 PROCEDURE — 258N000003 HC RX IP 258 OP 636: Performed by: INTERNAL MEDICINE

## 2024-04-10 PROCEDURE — 77427 RADIATION TX MANAGEMENT X5: CPT | Performed by: RADIOLOGY

## 2024-04-10 PROCEDURE — G0463 HOSPITAL OUTPT CLINIC VISIT: HCPCS | Performed by: INTERNAL MEDICINE

## 2024-04-10 PROCEDURE — 85025 COMPLETE CBC W/AUTO DIFF WBC: CPT | Performed by: INTERNAL MEDICINE

## 2024-04-10 PROCEDURE — 77014 PR CT GUIDE FOR PLACEMENT RADIATION THERAPY FIELDS: CPT | Mod: 26 | Performed by: RADIOLOGY

## 2024-04-10 RX ORDER — EPINEPHRINE 1 MG/ML
0.3 INJECTION, SOLUTION INTRAMUSCULAR; SUBCUTANEOUS EVERY 5 MIN PRN
Status: DISCONTINUED | OUTPATIENT
Start: 2024-04-10 | End: 2024-04-10 | Stop reason: HOSPADM

## 2024-04-10 RX ORDER — LIDOCAINE HYDROCHLORIDE 20 MG/ML
SOLUTION OROPHARYNGEAL
COMMUNITY
Start: 2024-04-03 | End: 2024-05-06

## 2024-04-10 RX ORDER — PALONOSETRON 0.05 MG/ML
0.25 INJECTION, SOLUTION INTRAVENOUS ONCE
Status: COMPLETED | OUTPATIENT
Start: 2024-04-10 | End: 2024-04-10

## 2024-04-10 RX ORDER — LORAZEPAM 2 MG/ML
0.5 INJECTION INTRAMUSCULAR EVERY 4 HOURS PRN
Status: DISCONTINUED | OUTPATIENT
Start: 2024-04-10 | End: 2024-04-10 | Stop reason: HOSPADM

## 2024-04-10 RX ORDER — ALBUTEROL SULFATE 90 UG/1
1-2 AEROSOL, METERED RESPIRATORY (INHALATION)
Status: DISCONTINUED | OUTPATIENT
Start: 2024-04-10 | End: 2024-04-10 | Stop reason: HOSPADM

## 2024-04-10 RX ORDER — METHYLPREDNISOLONE SODIUM SUCCINATE 125 MG/2ML
125 INJECTION, POWDER, LYOPHILIZED, FOR SOLUTION INTRAMUSCULAR; INTRAVENOUS
Status: DISCONTINUED | OUTPATIENT
Start: 2024-04-10 | End: 2024-04-10 | Stop reason: HOSPADM

## 2024-04-10 RX ORDER — HEPARIN SODIUM (PORCINE) LOCK FLUSH IV SOLN 100 UNIT/ML 100 UNIT/ML
5 SOLUTION INTRAVENOUS
Status: DISCONTINUED | OUTPATIENT
Start: 2024-04-10 | End: 2024-04-10 | Stop reason: HOSPADM

## 2024-04-10 RX ORDER — ALBUTEROL SULFATE 0.83 MG/ML
2.5 SOLUTION RESPIRATORY (INHALATION)
Status: DISCONTINUED | OUTPATIENT
Start: 2024-04-10 | End: 2024-04-10 | Stop reason: HOSPADM

## 2024-04-10 RX ORDER — MEPERIDINE HYDROCHLORIDE 50 MG/ML
25 INJECTION INTRAMUSCULAR; INTRAVENOUS; SUBCUTANEOUS EVERY 30 MIN PRN
Status: DISCONTINUED | OUTPATIENT
Start: 2024-04-10 | End: 2024-04-10 | Stop reason: HOSPADM

## 2024-04-10 RX ORDER — DIPHENHYDRAMINE HYDROCHLORIDE 50 MG/ML
50 INJECTION INTRAMUSCULAR; INTRAVENOUS
Status: DISCONTINUED | OUTPATIENT
Start: 2024-04-10 | End: 2024-04-10 | Stop reason: HOSPADM

## 2024-04-10 RX ADMIN — FOSAPREPITANT: 150 INJECTION, POWDER, LYOPHILIZED, FOR SOLUTION INTRAVENOUS at 13:03

## 2024-04-10 RX ADMIN — SODIUM CHLORIDE 81 MG: 9 INJECTION, SOLUTION INTRAVENOUS at 14:16

## 2024-04-10 RX ADMIN — PALONOSETRON 0.25 MG: 0.05 INJECTION, SOLUTION INTRAVENOUS at 12:59

## 2024-04-10 RX ADMIN — SODIUM CHLORIDE 1000 ML: 9 INJECTION, SOLUTION INTRAVENOUS at 12:55

## 2024-04-10 ASSESSMENT — ENCOUNTER SYMPTOMS
EYES NEGATIVE: 1
ENDOCRINE NEGATIVE: 1
PSYCHIATRIC NEGATIVE: 1
MYALGIAS: 1
HEADACHES: 1
RESPIRATORY NEGATIVE: 1
GASTROINTESTINAL NEGATIVE: 1
HEMATOLOGIC/LYMPHATIC NEGATIVE: 1
CARDIOVASCULAR NEGATIVE: 1
FATIGUE: 1

## 2024-04-10 ASSESSMENT — PAIN SCALES - GENERAL
PAINLEVEL: MILD PAIN (2)
PAINLEVEL: MILD PAIN (3)

## 2024-04-10 NOTE — PROGRESS NOTES
Infusion Nursing Note:  Donovan Yi presents today for D8C1 Cisplatin.    Patient seen by provider today: Yes: Dr Huertas and Dr Yang   present during visit today: Not Applicable.    Note: pt given 1 liter of fluid, aloxi, and emend/dex as pre meds.      Intravenous Access:  Peripheral IV placed.    Treatment Conditions:  Lab Results   Component Value Date    HGB 13.3 04/10/2024    WBC 6.1 04/10/2024    ANEU 4.3 01/16/2006    ANEUTAUTO 4.3 04/10/2024     04/10/2024        Lab Results   Component Value Date     04/10/2024    POTASSIUM 4.0 04/10/2024    MAG 1.9 04/10/2024    CR 0.75 04/10/2024    EMILY 9.0 04/10/2024    BILITOTAL 0.5 04/10/2024    ALBUMIN 4.1 04/10/2024    ALT 29 04/10/2024    AST 26 04/10/2024       Results reviewed, labs MET treatment parameters, ok to proceed with treatment.      Post Infusion Assessment:  Patient tolerated infusion without incident.  Site patent and intact, free from redness, edema or discomfort.       Discharge Plan:   Patient and/or family verbalized understanding of discharge instructions and all questions answered.      Saira Trejo RN

## 2024-04-10 NOTE — PROGRESS NOTES
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      The patient began definitive chemoradiation last week and did not experience any nausea with the cisplatin but did develop excruciating headaches and muscle aches about 3 days after the infusion.  He wound up taking some of his wife's Imitrex for the headache.  Most of the myalgias were in his thighs.      He does have a lot of concerns about his nutrition because he's been on a highly restricted diet for quite a while.  He says he essentially lives on pancakes and filet.  He's concerned about his microbiome. Apparently, at his prior visit with Dr. Aldana, TPN was mentioned and he's very interested in that.      ECOG = 0    Oncologic History    Mr. Yi is a 73 year old male who is a former smoker 1 pack a day for 16 years and quit smoking since 1984.  Patient presented with recent history of some pain and discomfort related to this and thinks that has been present for about a month. He denies any otalgia or tongue numbness.  He was found to have a lesion on the left side of the tongue.  He does not have a dyne aphasia or dysphagia. He has not had any hoarseness and has not noticed any lumps or bumps in his neck.  ENT examination revealed an obvious mass pushing forward into the junctional tongue and tongue base. It has a large crater in the center with some raised tissue laterally and looks endophytic into the left tonsillar fossa.  There is no palpable lymphadenopathy.  Biopsy was obtained on 2/28/2024 with pathology showed p16 positive squamous  cell carcinoma.  Patient had a staging PET CT scan on 3/6/2024 which showed FDG avid 2.7 x 2.6 x 4.4 cm mass in the left base of tongue extending inferiorly into the vallecula compatible with biopsy-proven squamous cell carcinoma.  There is no evidence of lymph nodes and systemic metastasis.  The case has been reviewed on ENT tumor conference of [facility].  The tumor was staged T3 N0 M0 and the consensus recommendation is to consider concurrent chemoradiation therapy.    Patient Active Problem List   Diagnosis    Opioid type dependence, continuous (H)    Lumbar Disc Degeneration    PVD (posterior vitreous detachment), unspecified laterality    Cortical age-related cataract of both eyes    Sacroiliac pain    Essential hypertension    Claudication (H24)    Tongue mass    Primary squamous cell carcinoma of base of tongue (H)     Current Outpatient Medications   Medication Sig Dispense Refill    aspirin (ASA) 325 MG tablet Take 1 tablet by mouth      cyclobenzaprine (FLEXERIL) 10 MG tablet Take 10 mg by mouth 3 times daily as needed      dexAMETHasone (DECADRON) 4 MG tablet Take 2 tablets (8 mg) by mouth daily Take for 3 days, starting the day after chemo. Take with food. If no nausea and vomiting with first cisplatin doses, may stop dexamethasone. 6 tablet 5    diazepam (VALIUM) 5 MG tablet Take 1-2 tablets (5-10 mg) by mouth every 12 hours as needed for anxiety (prior to radiation treatments) 20 tablet 1    HYDROcodone-acetaminophen (NORCO)  MG per tablet       lisinopril (ZESTRIL) 10 MG tablet Take 2 tablets (20 mg) by mouth daily 90 tablet 1    magic mouthwash suspension (diphenhydrAMINE, lidocaine, aluminum-magnesium & simethicone) Swish and swallow 10 mLs in mouth every 6 hours as needed for mouth sores (Patient not taking: Reported on 4/10/2024) 300 mL 1    Multiple Vitamins-Minerals (ONE-A-DAY 50 PLUS PO) Take 1 tablet by mouth daily      ondansetron (ZOFRAN) 8 MG tablet Take 1 tablet (8 mg) by  mouth every 8 hours as needed for nausea (vomiting) 30 tablet 2    Probiotic Product (PROBIOTIC BLEND PO) Take 2 capsules by mouth daily Prebiotic and Probiotic blend      rosuvastatin (CRESTOR) 20 MG tablet Take 1 tablet by mouth at bedtime      sodium fluoride dental gel (PREVIDENT) 1.1 % GEL topical gel USE AS DIRECTED OVERNIGHT. DISPENSE A SMALL AMOUNT INTO UPPER AND LOWER TRAYS. DO NOT EAT OR DRINK AFTER      tamsulosin (FLOMAX) 0.4 MG capsule TAKE 1 CAPSULE(0.4 MG) BY MOUTH DAILY 90 capsule 1    lidocaine, viscous, (XYLOCAINE) 2 % solution       LORazepam (ATIVAN) 0.5 MG tablet Take 1 tablet (0.5 mg) by mouth nightly as needed for anxiety or sleep (Patient not taking: Reported on 4/3/2024) 30 tablet 1    LORazepam (ATIVAN) 0.5 MG tablet Take 1 tablet (0.5 mg) by mouth every 6 hours as needed for anxiety (Take 0.5 to 1.0 mg 1 hour before planned procedure.) (Patient not taking: Reported on 4/3/2024) 20 tablet 0    traZODone (DESYREL) 100 MG tablet Take 1 tablet (100 mg) by mouth at bedtime (Patient not taking: Reported on 4/3/2024) 30 tablet 2     No current facility-administered medications for this visit.     Facility-Administered Medications Ordered in Other Visits   Medication Dose Route Frequency Provider Last Rate Last Admin    albuterol (PROVENTIL HFA/VENTOLIN HFA) inhaler  1-2 puff Inhalation Once PRN Michael Aldana MD        albuterol (PROVENTIL) neb solution 2.5 mg  2.5 mg Nebulization Once PRN Michael Aldana MD        barium sulfate (EZ-DISK) tablet 700 mg  700 mg Oral Once Rodrigue Garcia MD        barium sulfate (VARIBAR THIN Liquid) 40 % oral suspension 24 g  60 mL Oral Once Rodrigue Garcia MD        barium sulfate (VARIBAR) 40 % pudding/paste 30 mL  30 mL Oral Once Rodrigue Garcia MD        barium sulfate 40% (VARIBAR NECTAR) oral suspension   Oral Once Rodrigue Garcia MD        CISplatin (PLATINOL) 81 mg in sodium chloride 0.9 % 631 mL infusion  40 mg/m2  (Treatment Plan Recorded) Intravenous Once Michael Aldana MD        diphenhydrAMINE (BENADRYL) injection 50 mg  50 mg Intravenous Once PRN Michael Aldana MD        EPINEPHrine (Anaphylaxis) (ADRENALIN) injection (vial) 0.3 mg  0.3 mg Intramuscular Q5 Min PRN Michael Aldana MD        famotidine (PEPCID) injection 20 mg  20 mg Intravenous Once PRN Michael Aldana MD        famotidine (PEPCID) injection 20 mg  20 mg Intravenous Once PRN Michael Aldana MD        fosaprepitant (EMEND) 150 mg, dexAMETHasone (DECADRON) 12 mg in sodium chloride 0.9 % 276.2 mL intermittent infusion   Intravenous Once Michael Aldana MD        heparin 100 unit/mL injection 5 mL  5 mL Intracatheter Once PRN Michael Aldana MD        LORazepam (ATIVAN) injection 0.5 mg  0.5 mg Intravenous Q4H PRN Michael Aldana MD        meperidine (DEMEROL) injection 25 mg  25 mg Intravenous Q30 Min PRN Michael Aldana MD        methylPREDNISolone sodium succinate (solu-MEDROL) injection 125 mg  125 mg Intravenous Once PRN Michael Aldana MD        palonosetron (ALOXI) injection 0.25 mg  0.25 mg Intravenous Once Michael Aldana MD        sodium chloride (PF) 0.9% PF flush 3-20 mL  3-20 mL Intracatheter q1 min prn Michael Aldana MD        sodium chloride 0.9% BOLUS 1,000 mL  1,000 mL Intravenous Once Michael Aldana MD        sodium chloride 0.9% BOLUS 500 mL  500 mL Intravenous Once PRN Michael Aldana MD         Past Medical History:   Diagnosis Date    Food intolerance in adult     Hypertension     Malignant neoplasm of base of tongue (H)      Past Surgical History:   Procedure Laterality Date    HC REMOVAL GALLBLADDER      Description: Cholecystectomy;  Recorded: 12/06/2011;     Socioeconomic History    Marital status:      Social Determinants of Health     Interpersonal Safety: Low Risk  (2/14/2024)    Interpersonal Safety     Do you feel physically and emotionally safe where you currently live?: Yes     Within the past 12  "months, have you been hit, slapped, kicked or otherwise physically hurt by someone?: No     Within the past 12 months, have you been humiliated or emotionally abused in other ways by your partner or ex-partner?: No     Review of Systems   Constitutional:  Positive for fatigue.   HENT:   Positive for lump/mass.    Eyes: Negative.    Respiratory: Negative.     Cardiovascular: Negative.    Gastrointestinal: Negative.    Endocrine: Negative.    Genitourinary: Negative.     Musculoskeletal:  Positive for myalgias.   Skin: Negative.    Neurological:  Positive for headaches.   Hematological: Negative.    Psychiatric/Behavioral: Negative.     All other systems reviewed and are negative.    BP (!) 180/70   Pulse 82   Temp 98.2  F (36.8  C)   Resp 18   Ht 1.778 m (5' 10\")   Wt 82.6 kg (182 lb)   SpO2 96%   BMI 26.11 kg/m      Physical Exam  Vitals and nursing note reviewed.   Constitutional:       Appearance: Normal appearance. He is well-developed and normal weight.      Comments: Seems concerned, but polite and pleasant   HENT:      Head: Normocephalic and atraumatic.      Mouth/Throat:      Dentition: Normal dentition. No dental caries.   Cardiovascular:      Rate and Rhythm: Normal rate and regular rhythm.      Heart sounds: Normal heart sounds.   Pulmonary:      Effort: Pulmonary effort is normal.      Breath sounds: Normal breath sounds.   Abdominal:      General: Abdomen is flat. A surgical scar is present. There is no distension.      Palpations: Abdomen is soft. There is no mass.      Tenderness: There is no abdominal tenderness. There is no guarding.       Musculoskeletal:         General: No swelling or deformity.      Cervical back: Normal range of motion and neck supple.   Lymphadenopathy:      Cervical: No cervical adenopathy.   Skin:     General: Skin is warm and dry.      Comments: Extensive changes of hyper- and hypo-pigmentation with focal vitiligo   Neurological:      General: No focal deficit " present.      Mental Status: He is alert and oriented to person, place, and time.      Cranial Nerves: No cranial nerve deficit.      Motor: No weakness.      Gait: Gait normal.      Deep Tendon Reflexes: Reflexes normal.   Psychiatric:         Mood and Affect: Mood is anxious.         Behavior: Behavior normal. Behavior is cooperative.         Thought Content: Thought content normal.         Judgment: Judgment normal.       Recent Results (from the past 720 hour(s))   Comprehensive metabolic panel    Collection Time: 04/03/24 11:25 AM   Result Value Ref Range    Sodium 142 135 - 145 mmol/L    Potassium 3.6 3.4 - 5.3 mmol/L    Carbon Dioxide (CO2) 30 (H) 22 - 29 mmol/L    Anion Gap 10 7 - 15 mmol/L    Urea Nitrogen 17.0 8.0 - 23.0 mg/dL    Creatinine 0.89 0.67 - 1.17 mg/dL    GFR Estimate 89 >60 mL/min/1.73m2    Calcium 9.4 8.8 - 10.2 mg/dL    Chloride 102 98 - 107 mmol/L    Glucose 101 (H) 70 - 99 mg/dL    Alkaline Phosphatase 73 40 - 150 U/L    AST 21 0 - 45 U/L    ALT 13 0 - 70 U/L    Protein Total 7.4 6.4 - 8.3 g/dL    Albumin 4.4 3.5 - 5.2 g/dL    Bilirubin Total 0.6 <=1.2 mg/dL   Magnesium    Collection Time: 04/03/24 11:25 AM   Result Value Ref Range    Magnesium 2.0 1.7 - 2.3 mg/dL   CBC with platelets and differential    Collection Time: 04/03/24 11:25 AM   Result Value Ref Range    WBC Count 6.5 4.0 - 11.0 10e3/uL    RBC Count 4.59 4.40 - 5.90 10e6/uL    Hemoglobin 13.8 13.3 - 17.7 g/dL    Hematocrit 41.1 40.0 - 53.0 %    MCV 90 78 - 100 fL    MCH 30.1 26.5 - 33.0 pg    MCHC 33.6 31.5 - 36.5 g/dL    RDW 13.0 10.0 - 15.0 %    Platelet Count 248 150 - 450 10e3/uL    % Neutrophils 65 %    % Lymphocytes 18 %    % Monocytes 11 %    % Eosinophils 4 %    % Basophils 1 %    % Immature Granulocytes 0 %    NRBCs per 100 WBC 0 <1 /100    Absolute Neutrophils 4.2 1.6 - 8.3 10e3/uL    Absolute Lymphocytes 1.2 0.8 - 5.3 10e3/uL    Absolute Monocytes 0.7 0.0 - 1.3 10e3/uL    Absolute Eosinophils 0.3 0.0 - 0.7 10e3/uL     Absolute Basophils 0.1 0.0 - 0.2 10e3/uL    Absolute Immature Granulocytes 0.0 <=0.4 10e3/uL    Absolute NRBCs 0.0 10e3/uL   Comprehensive metabolic panel    Collection Time: 04/10/24 11:00 AM   Result Value Ref Range    Sodium 141 135 - 145 mmol/L    Potassium 4.0 3.4 - 5.3 mmol/L    Carbon Dioxide (CO2) 27 22 - 29 mmol/L    Anion Gap 10 7 - 15 mmol/L    Urea Nitrogen 23.2 (H) 8.0 - 23.0 mg/dL    Creatinine 0.75 0.67 - 1.17 mg/dL    GFR Estimate >90 >60 mL/min/1.73m2    Calcium 9.0 8.8 - 10.2 mg/dL    Chloride 104 98 - 107 mmol/L    Glucose 166 (H) 70 - 99 mg/dL    Alkaline Phosphatase 69 40 - 150 U/L    AST 26 0 - 45 U/L    ALT 29 0 - 70 U/L    Protein Total 6.9 6.4 - 8.3 g/dL    Albumin 4.1 3.5 - 5.2 g/dL    Bilirubin Total 0.5 <=1.2 mg/dL   Magnesium    Collection Time: 04/10/24 11:00 AM   Result Value Ref Range    Magnesium 1.9 1.7 - 2.3 mg/dL   CBC with platelets and differential    Collection Time: 04/10/24 11:00 AM   Result Value Ref Range    WBC Count 6.1 4.0 - 11.0 10e3/uL    RBC Count 4.48 4.40 - 5.90 10e6/uL    Hemoglobin 13.3 13.3 - 17.7 g/dL    Hematocrit 39.6 (L) 40.0 - 53.0 %    MCV 88 78 - 100 fL    MCH 29.7 26.5 - 33.0 pg    MCHC 33.6 31.5 - 36.5 g/dL    RDW 12.8 10.0 - 15.0 %    Platelet Count 231 150 - 450 10e3/uL    % Neutrophils 71 %    % Lymphocytes 15 %    % Monocytes 8 %    % Eosinophils 5 %    % Basophils 0 %    % Immature Granulocytes 1 %    NRBCs per 100 WBC 0 <1 /100    Absolute Neutrophils 4.3 1.6 - 8.3 10e3/uL    Absolute Lymphocytes 0.9 0.8 - 5.3 10e3/uL    Absolute Monocytes 0.5 0.0 - 1.3 10e3/uL    Absolute Eosinophils 0.3 0.0 - 0.7 10e3/uL    Absolute Basophils 0.0 0.0 - 0.2 10e3/uL    Absolute Immature Granulocytes 0.0 <=0.4 10e3/uL    Absolute NRBCs 0.0 10e3/uL     No results found for this or any previous visit (from the past 744 hour(s)).

## 2024-04-10 NOTE — LETTER
4/10/2024         RE: Donovan Yi  [address] [address]        Dear Colleague,    Thank you for referring your patient, Donovan Yi, to the [facility] RADIATION ONCOL[facility] [place]. Please see a copy of my visit note below.    RADIATION ONCOLOGY WEEKLY TREATMENT VISIT NOTE      Assessment / Impression       Visit Dx:  (C01) Malignant neoplasm of base of tongue (H)  (primary encounter diagnosis)     Tolerating radiation therapy well.  All questions and concerns addressed.    Plan:     Continue radiation treatment as prescribed.    Discussed with patient about appropriate nutritional support during the therapy.     Subjective:      HPI: Donovan Yi is a 73 year old male with  Malignant neoplasm of base of tongue (H) [C01]    The following portions of the patient's history were reviewed and updated as appropriate: allergies, current medications, past family history, past medical history, past social history, past surgical history and problem list.    Assessment                  Body Site:  Head and Neck Site: BOT  Stereotactic Radiosurgery: No  Concurrent Therapy: Yes (weekly cisplatin)  Today's Dose: 1000  Total Dose for Head and Neck: 7000  Today's Fraction/Total Fraction Head and Neck: 5/35  Mucositis due to radiation: 0: None  Thrush: 0: Absent  Pharynx and Esphogaus: 1: Tolerates regular diet  Voice Chances/Stridor/Larynx: 0: Normal  Ocular/Visual - other : 0: Normal  Middle ear/hearin: Normal  Tinnitus: 0: No  Cough: 0: Absent                                   Sexuality Alteration                    Emotional Alteration    Copin: Ineffective  Comfort Alteration   KPS: 90% Can perform normal activity, minor signs of disease  Fatigue (ONS scale): 0: No Fatigue  Pain Location: chronic low back  Pain Intensity. Rate degree of pain ranging from 0 (no pain) to 10 (severe pain): 3  Pain Description: Ache - Muscular type ache  Pain  Intervention: 3: Opiods  Effectiveness of pain intervention: 3: Pain relieved 75%   Nutrition Alteration   Anorexia: 0: None  Nausea: 0: None  Vomitin: None  Weight: 82.3 kg (181 lb 6.4 oz)  Pharynx and Esphogaus: 1: Tolerates regular diet  Skin Alteration   Skin Sensation: 0: No problem  Skin Reaction: 0: None  AUA Assessment                                           Accompanied by       Objective:     Exam:  no Erythema.    Vitals:    04/10/24 1157   BP: (!) 193/85   Pulse: 75   Resp: 16   Temp: 97.8  F (36.6  C)   TempSrc: Oral   SpO2: 99%   Weight: 82.3 kg (181 lb 6.4 oz)   PainSc: Mild Pain (2)   PainLoc: Low Back       Wt Readings from Last 8 Encounters:   04/10/24 82.3 kg (181 lb 6.4 oz)   04/10/24 82.6 kg (182 lb)   24 82.4 kg (181 lb 11.2 oz)   24 82.6 kg (182 lb)   03/15/24 83 kg (182 lb 14.4 oz)   24 83.3 kg (183 lb 11.2 oz)   24 83.1 kg (183 lb 3.2 oz)   23 79.4 kg (175 lb 1.6 oz)       General: Alert and oriented, in no acute distress  Christopher has no Erythema.  Aria chart and setup information reviewed    Joyce Yang MD      Again, thank you for allowing me to participate in the care of your patient.        Sincerely,        Joyce Yang MD

## 2024-04-10 NOTE — LETTER
4/10/2024         RE: Donovan Yi  [address] [address]        Dear Colleague,    Thank you for referring your patient, Donovan Yi, to the [facility]. Please see a copy of my visit note below.    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      The patient began definitive chemoradiation last week and did not experience any nausea with the cisplatin but did develop excruciating headaches and muscle aches about 3 days after the infusion.  He wound up taking some of his wife's Imitrex for the headache.  Most of the myalgias were in his thighs.      He does have a lot of concerns about his nutrition because he's been on a highly restricted diet for quite a while.  He says he essentially lives on pancakes and filet.  He's concerned about his microbiome. Apparently, at his prior visit with Dr. Aldana, TPN was mentioned and he's very interested in that.      ECOG = 0    Oncologic History    Mr. Yi is a 73 year old male who is a former smoker 1 pack a day for 16 years and quit smoking since 1984.  Patient presented with recent history of some pain and discomfort related to this and thinks that has been present for about a month. He denies any otalgia or tongue numbness.  He was found to have a lesion on the left side of the tongue.  He does not have a dyne aphasia or dysphagia. He has not had any hoarseness and has not noticed any lumps or bumps in his neck.  ENT examination revealed an obvious mass pushing forward into the  junctional tongue and tongue base. It has a large crater in the center with some raised tissue laterally and looks endophytic into the left tonsillar fossa.  There is no palpable lymphadenopathy.  Biopsy was obtained on 2/28/2024 with pathology showed p16 positive squamous cell carcinoma.  Patient had a staging PET CT scan on 3/6/2024 which showed FDG avid 2.7 x 2.6 x 4.4 cm mass in the left base of tongue extending inferiorly into the vallecula compatible with biopsy-proven squamous cell carcinoma.  There is no evidence of lymph nodes and systemic metastasis.  The case has been reviewed on ENT tumor conference of [facility].  The tumor was staged T3 N0 M0 and the consensus recommendation is to consider concurrent chemoradiation therapy.    Patient Active Problem List   Diagnosis     Opioid type dependence, continuous (H)     Lumbar Disc Degeneration     PVD (posterior vitreous detachment), unspecified laterality     Cortical age-related cataract of both eyes     Sacroiliac pain     Essential hypertension     Claudication (H24)     Tongue mass     Primary squamous cell carcinoma of base of tongue (H)     Current Outpatient Medications   Medication Sig Dispense Refill     aspirin (ASA) 325 MG tablet Take 1 tablet by mouth       cyclobenzaprine (FLEXERIL) 10 MG tablet Take 10 mg by mouth 3 times daily as needed       dexAMETHasone (DECADRON) 4 MG tablet Take 2 tablets (8 mg) by mouth daily Take for 3 days, starting the day after chemo. Take with food. If no nausea and vomiting with first cisplatin doses, may stop dexamethasone. 6 tablet 5     diazepam (VALIUM) 5 MG tablet Take 1-2 tablets (5-10 mg) by mouth every 12 hours as needed for anxiety (prior to radiation treatments) 20 tablet 1     HYDROcodone-acetaminophen (NORCO)  MG per tablet        lisinopril (ZESTRIL) 10 MG tablet Take 2 tablets (20 mg) by mouth daily 90 tablet 1     magic mouthwash suspension (diphenhydrAMINE, lidocaine,  aluminum-magnesium & simethicone) Swish and swallow 10 mLs in mouth every 6 hours as needed for mouth sores (Patient not taking: Reported on 4/10/2024) 300 mL 1     Multiple Vitamins-Minerals (ONE-A-DAY 50 PLUS PO) Take 1 tablet by mouth daily       ondansetron (ZOFRAN) 8 MG tablet Take 1 tablet (8 mg) by mouth every 8 hours as needed for nausea (vomiting) 30 tablet 2     Probiotic Product (PROBIOTIC BLEND PO) Take 2 capsules by mouth daily Prebiotic and Probiotic blend       rosuvastatin (CRESTOR) 20 MG tablet Take 1 tablet by mouth at bedtime       sodium fluoride dental gel (PREVIDENT) 1.1 % GEL topical gel USE AS DIRECTED OVERNIGHT. DISPENSE A SMALL AMOUNT INTO UPPER AND LOWER TRAYS. DO NOT EAT OR DRINK AFTER       tamsulosin (FLOMAX) 0.4 MG capsule TAKE 1 CAPSULE(0.4 MG) BY MOUTH DAILY 90 capsule 1     lidocaine, viscous, (XYLOCAINE) 2 % solution        LORazepam (ATIVAN) 0.5 MG tablet Take 1 tablet (0.5 mg) by mouth nightly as needed for anxiety or sleep (Patient not taking: Reported on 4/3/2024) 30 tablet 1     LORazepam (ATIVAN) 0.5 MG tablet Take 1 tablet (0.5 mg) by mouth every 6 hours as needed for anxiety (Take 0.5 to 1.0 mg 1 hour before planned procedure.) (Patient not taking: Reported on 4/3/2024) 20 tablet 0     traZODone (DESYREL) 100 MG tablet Take 1 tablet (100 mg) by mouth at bedtime (Patient not taking: Reported on 4/3/2024) 30 tablet 2     No current facility-administered medications for this visit.     Facility-Administered Medications Ordered in Other Visits   Medication Dose Route Frequency Provider Last Rate Last Admin     albuterol (PROVENTIL HFA/VENTOLIN HFA) inhaler  1-2 puff Inhalation Once PRN Michael Aldana MD         albuterol (PROVENTIL) neb solution 2.5 mg  2.5 mg Nebulization Once PRN Michael Aldana MD         barium sulfate (EZ-DISK) tablet 700 mg  700 mg Oral Once Rodrigue Garcia MD         barium sulfate (VARIBAR THIN Liquid) 40 % oral suspension 24 g  60 mL Oral  Once Rodrigue Garcia MD         barium sulfate (VARIBAR) 40 % pudding/paste 30 mL  30 mL Oral Once Rodrigue Garcia MD         barium sulfate 40% (VARIBAR NECTAR) oral suspension   Oral Once Rodrigue Garcia MD         CISplatin (PLATINOL) 81 mg in sodium chloride 0.9 % 631 mL infusion  40 mg/m2 (Treatment Plan Recorded) Intravenous Once Michael Aldana MD         diphenhydrAMINE (BENADRYL) injection 50 mg  50 mg Intravenous Once PRN Micheal Aldana MD         EPINEPHrine (Anaphylaxis) (ADRENALIN) injection (vial) 0.3 mg  0.3 mg Intramuscular Q5 Min PRN Michael Aldana MD         famotidine (PEPCID) injection 20 mg  20 mg Intravenous Once PRN Michael Aldana MD         famotidine (PEPCID) injection 20 mg  20 mg Intravenous Once PRN Michael Aldana MD         fosaprepitant (EMEND) 150 mg, dexAMETHasone (DECADRON) 12 mg in sodium chloride 0.9 % 276.2 mL intermittent infusion   Intravenous Once Michael Aldana MD         heparin 100 unit/mL injection 5 mL  5 mL Intracatheter Once PRN Michael Aldana MD         LORazepam (ATIVAN) injection 0.5 mg  0.5 mg Intravenous Q4H PRN Michael Aldana MD         meperidine (DEMEROL) injection 25 mg  25 mg Intravenous Q30 Min PRN Michael Aldana MD         methylPREDNISolone sodium succinate (solu-MEDROL) injection 125 mg  125 mg Intravenous Once PRN Michael Aladna MD         palonosetron (ALOXI) injection 0.25 mg  0.25 mg Intravenous Once Michael Aldana MD         sodium chloride (PF) 0.9% PF flush 3-20 mL  3-20 mL Intracatheter q1 min prn Michael Aldana MD         sodium chloride 0.9% BOLUS 1,000 mL  1,000 mL Intravenous Once Michael Aldana MD         sodium chloride 0.9% BOLUS 500 mL  500 mL Intravenous Once PRN Michael Aldana MD         Past Medical History:   Diagnosis Date     Food intolerance in adult      Hypertension      Malignant neoplasm of base of tongue (H)      Past Surgical History:   Procedure Laterality Date     HC REMOVAL  "GALLBLADDER      Description: Cholecystectomy;  Recorded: 12/06/2011;     Socioeconomic History     Marital status:      Social Determinants of Health     Interpersonal Safety: Low Risk  (2/14/2024)    Interpersonal Safety      Do you feel physically and emotionally safe where you currently live?: Yes      Within the past 12 months, have you been hit, slapped, kicked or otherwise physically hurt by someone?: No      Within the past 12 months, have you been humiliated or emotionally abused in other ways by your partner or ex-partner?: No     Review of Systems   Constitutional:  Positive for fatigue.   HENT:   Positive for lump/mass.    Eyes: Negative.    Respiratory: Negative.     Cardiovascular: Negative.    Gastrointestinal: Negative.    Endocrine: Negative.    Genitourinary: Negative.     Musculoskeletal:  Positive for myalgias.   Skin: Negative.    Neurological:  Positive for headaches.   Hematological: Negative.    Psychiatric/Behavioral: Negative.     All other systems reviewed and are negative.    BP (!) 180/70   Pulse 82   Temp 98.2  F (36.8  C)   Resp 18   Ht 1.778 m (5' 10\")   Wt 82.6 kg (182 lb)   SpO2 96%   BMI 26.11 kg/m      Physical Exam  Vitals and nursing note reviewed.   Constitutional:       Appearance: Normal appearance. He is well-developed and normal weight.      Comments: Seems concerned, but polite and pleasant   HENT:      Head: Normocephalic and atraumatic.      Mouth/Throat:      Dentition: Normal dentition. No dental caries.   Cardiovascular:      Rate and Rhythm: Normal rate and regular rhythm.      Heart sounds: Normal heart sounds.   Pulmonary:      Effort: Pulmonary effort is normal.      Breath sounds: Normal breath sounds.   Abdominal:      General: Abdomen is flat. A surgical scar is present. There is no distension.      Palpations: Abdomen is soft. There is no mass.      Tenderness: There is no abdominal tenderness. There is no guarding.       Musculoskeletal:         " General: No swelling or deformity.      Cervical back: Normal range of motion and neck supple.   Lymphadenopathy:      Cervical: No cervical adenopathy.   Skin:     General: Skin is warm and dry.      Comments: Extensive changes of hyper- and hypo-pigmentation with focal vitiligo   Neurological:      General: No focal deficit present.      Mental Status: He is alert and oriented to person, place, and time.      Cranial Nerves: No cranial nerve deficit.      Motor: No weakness.      Gait: Gait normal.      Deep Tendon Reflexes: Reflexes normal.   Psychiatric:         Mood and Affect: Mood is anxious.         Behavior: Behavior normal. Behavior is cooperative.         Thought Content: Thought content normal.         Judgment: Judgment normal.       Recent Results (from the past 720 hour(s))   Comprehensive metabolic panel    Collection Time: 04/03/24 11:25 AM   Result Value Ref Range    Sodium 142 135 - 145 mmol/L    Potassium 3.6 3.4 - 5.3 mmol/L    Carbon Dioxide (CO2) 30 (H) 22 - 29 mmol/L    Anion Gap 10 7 - 15 mmol/L    Urea Nitrogen 17.0 8.0 - 23.0 mg/dL    Creatinine 0.89 0.67 - 1.17 mg/dL    GFR Estimate 89 >60 mL/min/1.73m2    Calcium 9.4 8.8 - 10.2 mg/dL    Chloride 102 98 - 107 mmol/L    Glucose 101 (H) 70 - 99 mg/dL    Alkaline Phosphatase 73 40 - 150 U/L    AST 21 0 - 45 U/L    ALT 13 0 - 70 U/L    Protein Total 7.4 6.4 - 8.3 g/dL    Albumin 4.4 3.5 - 5.2 g/dL    Bilirubin Total 0.6 <=1.2 mg/dL   Magnesium    Collection Time: 04/03/24 11:25 AM   Result Value Ref Range    Magnesium 2.0 1.7 - 2.3 mg/dL   CBC with platelets and differential    Collection Time: 04/03/24 11:25 AM   Result Value Ref Range    WBC Count 6.5 4.0 - 11.0 10e3/uL    RBC Count 4.59 4.40 - 5.90 10e6/uL    Hemoglobin 13.8 13.3 - 17.7 g/dL    Hematocrit 41.1 40.0 - 53.0 %    MCV 90 78 - 100 fL    MCH 30.1 26.5 - 33.0 pg    MCHC 33.6 31.5 - 36.5 g/dL    RDW 13.0 10.0 - 15.0 %    Platelet Count 248 150 - 450 10e3/uL    % Neutrophils 65 %     % Lymphocytes 18 %    % Monocytes 11 %    % Eosinophils 4 %    % Basophils 1 %    % Immature Granulocytes 0 %    NRBCs per 100 WBC 0 <1 /100    Absolute Neutrophils 4.2 1.6 - 8.3 10e3/uL    Absolute Lymphocytes 1.2 0.8 - 5.3 10e3/uL    Absolute Monocytes 0.7 0.0 - 1.3 10e3/uL    Absolute Eosinophils 0.3 0.0 - 0.7 10e3/uL    Absolute Basophils 0.1 0.0 - 0.2 10e3/uL    Absolute Immature Granulocytes 0.0 <=0.4 10e3/uL    Absolute NRBCs 0.0 10e3/uL   Comprehensive metabolic panel    Collection Time: 04/10/24 11:00 AM   Result Value Ref Range    Sodium 141 135 - 145 mmol/L    Potassium 4.0 3.4 - 5.3 mmol/L    Carbon Dioxide (CO2) 27 22 - 29 mmol/L    Anion Gap 10 7 - 15 mmol/L    Urea Nitrogen 23.2 (H) 8.0 - 23.0 mg/dL    Creatinine 0.75 0.67 - 1.17 mg/dL    GFR Estimate >90 >60 mL/min/1.73m2    Calcium 9.0 8.8 - 10.2 mg/dL    Chloride 104 98 - 107 mmol/L    Glucose 166 (H) 70 - 99 mg/dL    Alkaline Phosphatase 69 40 - 150 U/L    AST 26 0 - 45 U/L    ALT 29 0 - 70 U/L    Protein Total 6.9 6.4 - 8.3 g/dL    Albumin 4.1 3.5 - 5.2 g/dL    Bilirubin Total 0.5 <=1.2 mg/dL   Magnesium    Collection Time: 04/10/24 11:00 AM   Result Value Ref Range    Magnesium 1.9 1.7 - 2.3 mg/dL   CBC with platelets and differential    Collection Time: 04/10/24 11:00 AM   Result Value Ref Range    WBC Count 6.1 4.0 - 11.0 10e3/uL    RBC Count 4.48 4.40 - 5.90 10e6/uL    Hemoglobin 13.3 13.3 - 17.7 g/dL    Hematocrit 39.6 (L) 40.0 - 53.0 %    MCV 88 78 - 100 fL    MCH 29.7 26.5 - 33.0 pg    MCHC 33.6 31.5 - 36.5 g/dL    RDW 12.8 10.0 - 15.0 %    Platelet Count 231 150 - 450 10e3/uL    % Neutrophils 71 %    % Lymphocytes 15 %    % Monocytes 8 %    % Eosinophils 5 %    % Basophils 0 %    % Immature Granulocytes 1 %    NRBCs per 100 WBC 0 <1 /100    Absolute Neutrophils 4.3 1.6 - 8.3 10e3/uL    Absolute Lymphocytes 0.9 0.8 - 5.3 10e3/uL    Absolute Monocytes 0.5 0.0 - 1.3 10e3/uL    Absolute Eosinophils 0.3 0.0 - 0.7 10e3/uL    Absolute  Basophils 0.0 0.0 - 0.2 10e3/uL    Absolute Immature Granulocytes 0.0 <=0.4 10e3/uL    Absolute NRBCs 0.0 10e3/uL     No results found for this or any previous visit (from the past 744 hour(s)).        Assessment & Plan  Squamous carcinoma of base of tongue        Again, thank you for allowing me to participate in the care of your patient.        Sincerely,        Jaqueline Huertas MD

## 2024-04-10 NOTE — PROGRESS NOTES
RADIATION ONCOLOGY WEEKLY TREATMENT VISIT NOTE      Assessment / Impression       Visit Dx:  (C01) Malignant neoplasm of base of tongue (H)  (primary encounter diagnosis)     Tolerating radiation therapy well.  All questions and concerns addressed.    Plan:     Continue radiation treatment as prescribed.    Discussed with patient about appropriate nutritional support during the therapy.     Subjective:      HPI: Donovan Yi is a 73 year old male with  Malignant neoplasm of base of tongue (H) [C01]    The following portions of the patient's history were reviewed and updated as appropriate: allergies, current medications, past family history, past medical history, past social history, past surgical history and problem list.    Assessment                  Body Site:  Head and Neck Site: BOT  Stereotactic Radiosurgery: No  Concurrent Therapy: Yes (weekly cisplatin)  Today's Dose: 1000  Total Dose for Head and Neck: 7000  Today's Fraction/Total Fraction Head and Neck: 5/35  Mucositis due to radiation: 0: None  Thrush: 0: Absent  Pharynx and Esphogaus: 1: Tolerates regular diet  Voice Chances/Stridor/Larynx: 0: Normal  Ocular/Visual - other : 0: Normal  Middle ear/hearin: Normal  Tinnitus: 0: No  Cough: 0: Absent                                   Sexuality Alteration                    Emotional Alteration    Copin: Ineffective  Comfort Alteration   KPS: 90% Can perform normal activity, minor signs of disease  Fatigue (ONS scale): 0: No Fatigue  Pain Location: chronic low back  Pain Intensity. Rate degree of pain ranging from 0 (no pain) to 10 (severe pain): 3  Pain Description: Ache - Muscular type ache  Pain Intervention: 3: Opiods  Effectiveness of pain intervention: 3: Pain relieved 75%   Nutrition Alteration   Anorexia: 0: None  Nausea: 0: None  Vomitin: None  Weight: 82.3 kg (181 lb 6.4 oz)  Pharynx and Esphogaus: 1: Tolerates regular diet  Skin Alteration   Skin Sensation: 0: No  problem  Skin Reaction: 0: None  AUA Assessment                                           Accompanied by       Objective:     Exam:  no Erythema.    Vitals:    04/10/24 1157   BP: (!) 193/85   Pulse: 75   Resp: 16   Temp: 97.8  F (36.6  C)   TempSrc: Oral   SpO2: 99%   Weight: 82.3 kg (181 lb 6.4 oz)   PainSc: Mild Pain (2)   PainLoc: Low Back       Wt Readings from Last 8 Encounters:   04/10/24 82.3 kg (181 lb 6.4 oz)   04/10/24 82.6 kg (182 lb)   04/03/24 82.4 kg (181 lb 11.2 oz)   03/18/24 82.6 kg (182 lb)   03/15/24 83 kg (182 lb 14.4 oz)   02/28/24 83.3 kg (183 lb 11.2 oz)   02/14/24 83.1 kg (183 lb 3.2 oz)   06/13/23 79.4 kg (175 lb 1.6 oz)       General: Alert and oriented, in no acute distress  Christopher has no Erythema.  Aria chart and setup information reviewed    Joyce Yang MD

## 2024-04-11 ENCOUNTER — APPOINTMENT (OUTPATIENT)
Dept: RADIATION ONCOLOGY | Facility: CLINIC | Age: 74
End: 2024-04-11
Attending: RADIOLOGY
Payer: MEDICARE

## 2024-04-11 ENCOUNTER — PATIENT OUTREACH (OUTPATIENT)
Dept: ONCOLOGY | Facility: HOSPITAL | Age: 74
End: 2024-04-11
Payer: MEDICARE

## 2024-04-11 PROCEDURE — 77014 PR CT GUIDE FOR PLACEMENT RADIATION THERAPY FIELDS: CPT | Mod: 26 | Performed by: STUDENT IN AN ORGANIZED HEALTH CARE EDUCATION/TRAINING PROGRAM

## 2024-04-11 PROCEDURE — 77386 HC IMRT TREATMENT DELIVERY, COMPLEX: CPT | Performed by: STUDENT IN AN ORGANIZED HEALTH CARE EDUCATION/TRAINING PROGRAM

## 2024-04-11 NOTE — PROGRESS NOTES
[facility] [place]: Cancer Care                                                                                          See CommonKey message.    Signature:  Sindy Ortiz RN  
[id number]

## 2024-04-12 ENCOUNTER — APPOINTMENT (OUTPATIENT)
Dept: RADIATION ONCOLOGY | Facility: CLINIC | Age: 74
End: 2024-04-12
Attending: RADIOLOGY
Payer: MEDICARE

## 2024-04-12 PROCEDURE — 77014 PR CT GUIDE FOR PLACEMENT RADIATION THERAPY FIELDS: CPT | Mod: 26 | Performed by: RADIOLOGY

## 2024-04-12 PROCEDURE — 77386 HC IMRT TREATMENT DELIVERY, COMPLEX: CPT

## 2024-04-15 ENCOUNTER — TRANSFERRED RECORDS (OUTPATIENT)
Dept: HEALTH INFORMATION MANAGEMENT | Facility: CLINIC | Age: 74
End: 2024-04-15

## 2024-04-15 ENCOUNTER — APPOINTMENT (OUTPATIENT)
Dept: RADIATION ONCOLOGY | Facility: CLINIC | Age: 74
End: 2024-04-15
Attending: RADIOLOGY
Payer: MEDICARE

## 2024-04-15 PROCEDURE — 77014 PR CT GUIDE FOR PLACEMENT RADIATION THERAPY FIELDS: CPT | Mod: 26 | Performed by: RADIOLOGY

## 2024-04-15 PROCEDURE — 77386 HC IMRT TREATMENT DELIVERY, COMPLEX: CPT | Performed by: RADIOLOGY

## 2024-04-15 NOTE — PROGRESS NOTES
"Palliative Care Clinic Consult Note    Patient Name: Donovan Yi  Primary Provider: Stephany Strickland    Chief Complaint/Patient ID: Donovan Yi is a 73 year old male with PMHx of SCC of the left tongue base. Currently getting chemoRT with cisplatin.  -Additional hx of chronic back pain, on opioids and managed by [facility].  -Dx PVD with claudication June 2023.  -Longstanding difficulty with GI struggles and tolerating certain foods/additives.     Reviewed: Yes, recent prescriptions for both diazepam and lorazepam.  He also has a history of regular prescriptions for Norco from [facility].    History of Present Illness:  Donovan Yi is a 73 year old male who is seen for a new consult via Video visit today with Palliative Care.     Reviewed [facility] note from 6/20/23. Stable back pain. Had been Rx'd Belbuca but was forgetting to take it.     Reviewed Oncology note from 3/18. Discussed plan for concurrent cisplatin with RT. Also discussed possibly needing J-tube placement for nutrition.    Says that the treatment has been hard, however he has ups and downs.  He is expecting his symptoms to get worse.  He has his third round of chemotherapy tomorrow (Wednesday), and says that on the first 2 infusions, he started having symptoms/side effects on Saturday that then improved by Monday morning.  Mostly concerned about significant muscle pain that he experienced in \"big muscles\".  Stated it felt like his quads were \"screaming\" and also woke up with his chest feeling this pain initially Sunday morning.  Did not seem to respond to aspirin, which she has been taking, as well as his chronic hydrocodone.  Heat has previously been helpful for him with pain was not helpful as well.  He is worried about this happening again.    He has been on 6 tablets of 10 mg hydrocodone for a very long time.  He saw his pain management clinician yesterday, and he says she increased his dosing up to 7 tablets.  States he actually " "forgot about the Belbuca until we were talking today.  States it did work really well for pain for him, however he thinks there were some concerns about his blood pressure as well as the cost.  He also remembers some conversation about gabapentin, but he thought he had to stop all opioids completely in order to try this in the past.  He does not really remember the conversation fully.    Pain in his mouth/throat has been increasing.  Has found that mixing Janay-[place] with aspirin helps for several hours, so he is doing this about 3 times daily.  He also does find the Magic mouthwash is helpful, though finds he still wakes up overnight.    Has a history of difficulty with tolerating certain foods, and says it has been really hard to figure out why.  He says most fruits and vegetables do not agree with him, so he can only eat them in moderation.  His main food right now has been pancakes.  He states \"if it comes in a box, there is a really good chance I cannot tolerate it\".  He knows he has a sensitivity to dairy, however he does not have a known sensitivity to gluten or wheat, however still certain products will affect him.  He is worried about keeping up with his nutrition going forward as artificial sweeteners also can affect him, and he is worried about finding a protein supplement that he tolerates.    Prior to his cancer treatment, he had been working out close to 6 days a week.  He is found that keeping up with exercise really helps with his pain, particularly in his back.     Social History:  .  Social History     Tobacco Use    Smoking status: Former     Current packs/day: 0.00     Average packs/day: 1 pack/day for 18.0 years (18.0 ttl pk-yrs)     Types: Cigarettes     Start date: 1966     Quit date: 1984     Years since quittin.3     Passive exposure: Never    Smokeless tobacco: Never   Vaping Use    Vaping status: Never Used     Advanced Care Planning: Did not address today.  Will need " to address at follow-up.    Family History- Reviewed in Epic.     Medications- Reviewed in Epic.    Past Medical History- Reviewed in [facility].    Past Surgical History- Reviewed in Epic.    Physical Exam:   Constitutional: Alert, pleasant, no apparent distress. Sitting up in chair.  Eyes: Sclera non-icteric, no eye discharge.  ENT: No nasal discharge. Ears grossly normal.  Respiratory: Unlabored respirations. Speaking in full sentences.  Musculoskeletal: Extremities appear normal- no gross deformities noted. No edema noted on upper body.   Skin: No suspicious lesions or rashes on visible skin.  Neurologic: Clear speech, no aphasia. No facial droop.  Psychiatric: Mentation appears normal, appropriate attention. Affect normal/bright. Does not appear anxious or depressed.    Key Data Reviewed:  LABS: 4/10/24- Cr 0.75, GFR >90, Albumin 4.1, LFTs wnl. WBC 6.1, Hgb 13.3, Plts 231.     IMAGING: PET scan 3/6/24- IMPRESSION:  1.  Hypermetabolic left base of tongue mass extending inferiorly into the vallecula, compatible with biopsy-proven squamous cell carcinoma. No evidence of FDG avid metastatic disease.  2.  Right upper lobe groundglass nodules/opacities with minimal FDG uptake. These may represent infectious foci versus groundglass nodules/adenocarcinoma spectrum lesions. Recommend attention on future oncologic imaging. Additional small solid pulmonary nodules are below the resolution of PET and can be followed on future imaging.    Impression & Recommendations & Counseling:  Donovan Yi is a 73 year old male with history of SCC of the tongue.    Introduced the role of palliative care as an interdisciplinary team that cares for patients with serious illness to help support symptom management, communication, coping for patients and their families as well as support with medical decision making.    Pain - Longstanding history of chronic pain in his back, managed by [facility].  We talked a little bit  about logistics of pain management.  I certainly would be happy to take over full management of his prescriptions while he is going through cancer treatment, however it can be that cumbersome for patients to have to switch back and forth between their clinicians.  He had forgotten about the Belbuca until we spoke today, and he remembered how it really did help his pain.  -He will check with his pain clinician about possibly resuming Belbuca or some other form of buprenorphine right now.  We talked about the utility of having a long-acting opioid on board, and then keeping his current dosing of the hydrocodone available for breakthrough pain, particularly if he gets worsening pain from his radiation treatments.  We discussed that this is likely to happen.  -He also thinks he will ask about the gabapentin again.  We discussed the utility of trying this, since it does work on a different pathway for pain than the other things he has been trying.  -Depending on this conversation, I can either consult with her or take over prescribing.  For now, I did not send any new prescriptions today.  -Suggested trying something topical, particularly if the muscle pain comes back.  Could try topical NSAID or capsaicin containing cream.    Dysphagia - Has been starting to happen, though generally able to manage at this point.  -He is not swallowing any of the Magic mouthwash, so I increased the dosing to every 4 hours as needed.  Suggested doing a dose prior to meals.  -He has a sample for [employer] protein shake, and I encouraged him to try that this weekend.  -We talked a little bit about the possibility of a J-tube, which would bypass some of his stomach and intestines and perhaps allow for some better tolerance of protein supplements.      Follow up: 2 to 3 weeks      Video-Visit Details  Video Start Time: 2:29 PM  Video End Time: 3:38 PM    Originating Location (pt. Location): Home     Distant Location (provider location):   Offsite- Personal Home     Platform used for Video Visit: Lucretia     Total time spent on day of encounter is 77 mins, including reviewing record, review of above studies, above visit with patient, symptomatic discussion as above, including medication adjustments/prescription management, and documentation.     Cheryl Atkins,   Palliative Medicine   [id number]    Some chart documentation performed using Dragon Voice recognition Software. Although reviewed after completion, some words and grammatical errors may remain.

## 2024-04-16 ENCOUNTER — VIRTUAL VISIT (OUTPATIENT)
Dept: PALLIATIVE CARE | Facility: CLINIC | Age: 74
End: 2024-04-16
Attending: STUDENT IN AN ORGANIZED HEALTH CARE EDUCATION/TRAINING PROGRAM
Payer: MEDICARE

## 2024-04-16 ENCOUNTER — APPOINTMENT (OUTPATIENT)
Dept: RADIATION ONCOLOGY | Facility: CLINIC | Age: 74
End: 2024-04-16
Attending: RADIOLOGY
Payer: MEDICARE

## 2024-04-16 VITALS — DIASTOLIC BLOOD PRESSURE: 64 MMHG | HEIGHT: 70 IN | BODY MASS INDEX: 26.03 KG/M2 | SYSTOLIC BLOOD PRESSURE: 134 MMHG

## 2024-04-16 DIAGNOSIS — M51.379 DEGENERATION OF LUMBAR OR LUMBOSACRAL INTERVERTEBRAL DISC: ICD-10-CM

## 2024-04-16 DIAGNOSIS — C01 MALIGNANT NEOPLASM OF BASE OF TONGUE (H): Primary | ICD-10-CM

## 2024-04-16 DIAGNOSIS — G89.3 CANCER RELATED PAIN: ICD-10-CM

## 2024-04-16 DIAGNOSIS — F11.20 OPIOID TYPE DEPENDENCE, CONTINUOUS (H): ICD-10-CM

## 2024-04-16 DIAGNOSIS — T66.XXXS RADIATION INJURY, SEQUELA: ICD-10-CM

## 2024-04-16 DIAGNOSIS — Z51.5 ENCOUNTER FOR PALLIATIVE CARE: ICD-10-CM

## 2024-04-16 DIAGNOSIS — R13.12 OROPHARYNGEAL DYSPHAGIA: ICD-10-CM

## 2024-04-16 PROCEDURE — 77386 HC IMRT TREATMENT DELIVERY, COMPLEX: CPT | Performed by: RADIOLOGY

## 2024-04-16 PROCEDURE — 99417 PROLNG OP E/M EACH 15 MIN: CPT | Mod: 95 | Performed by: STUDENT IN AN ORGANIZED HEALTH CARE EDUCATION/TRAINING PROGRAM

## 2024-04-16 PROCEDURE — 99205 OFFICE O/P NEW HI 60 MIN: CPT | Mod: 95 | Performed by: STUDENT IN AN ORGANIZED HEALTH CARE EDUCATION/TRAINING PROGRAM

## 2024-04-16 PROCEDURE — 77014 PR CT GUIDE FOR PLACEMENT RADIATION THERAPY FIELDS: CPT | Mod: 26 | Performed by: RADIOLOGY

## 2024-04-16 ASSESSMENT — PAIN SCALES - GENERAL: PAINLEVEL: MILD PAIN (3)

## 2024-04-16 NOTE — PATIENT INSTRUCTIONS
Recommendations:  -I like the idea of talking to your pain doctor about possibly restarting Belbuca, or another form of the buprenorphine.  I think having this in the background and allowing the hydrocodone to be present to help with breakthrough pain from your radiation treatment is very reasonable.  -Can potentially look into gabapentin as well if needed.  -Suggest looking into topical creams that you could apply to your muscles if the pain comes back.  A couple options would be a topical anti-inflammatory such as Voltaren, Aspercreme, or Bengay or a topical capsaicin containing agent.  The capsaicin might burn, but what I have been told is that the burning sometimes then gives way to numbness, which can help with the pain.  -I updated the prescription for the Magic mouthwash every 4 hours.  As long as you are not swallowing it, this is perfectly fine to do.  Some people find it helps to take a dose prior to eating or drinking.    Follow up: We will plan on a couple weeks, but please reach out sooner with any questions or concerns.      Reasons to Call    If you are having worsening/uncontrolled symptoms we want you to call!    You or your other physicians make any changes to medications we have prescribed.  -Please call for refills 4-5 days before you will run out of medication.    Important Phone Numbers, including: Refills, scheduling, and general questions     Palliative Care RN: Fidelina Mills - [phone]  *After hours or on weekends. Will connect you with on call MD. [phone]

## 2024-04-16 NOTE — LETTER
"4/16/2024       RE: Donovan Yi  [address] [place] [address]     Dear Colleague,    [facility]. Please see a copy of my visit note below.    Palliative Care Clinic Consult Note    Patient Name: Donovan Yi  Primary Provider: Stephany Strickland    Chief Complaint/Patient ID: Donovan Yi is a 73 year old male with PMHx of SCC of the left tongue base. Currently getting chemoRT with cisplatin.  -Additional hx of chronic back pain, on opioids and managed by [facility].  -Dx PVD with claudication June 2023.  -Longstanding difficulty with GI struggles and tolerating certain foods/additives.     Reviewed: Yes, recent prescriptions for both diazepam and lorazepam.  He also has a history of regular prescriptions for Norco from [facility].    History of Present Illness:  Donovan Yi is a 73 year old male who is seen for a new consult via Video visit today with Palliative Care.     Reviewed [facility] note from 6/20/23. Stable back pain. Had been Rx'd Belbuca but was forgetting to take it.     Reviewed Oncology note from 3/18. Discussed plan for concurrent cisplatin with RT. Also discussed possibly needing J-tube placement for nutrition.    Says that the treatment has been hard, however he has ups and downs.  He is expecting his symptoms to get worse.  He has his third round of chemotherapy tomorrow (Wednesday), and says that on the first 2 infusions, he started having symptoms/side effects on Saturday that then improved by Monday morning.  Mostly concerned about significant muscle pain that he experienced in \"big muscles\".  Stated it felt like his quads were \"screaming\" and also woke up with his chest feeling this pain initially Sunday morning.  Did not seem to respond to aspirin, which she has been taking, as well as his chronic " "hydrocodone.  Heat has previously been helpful for him with pain was not helpful as well.  He is worried about this happening again.    He has been on 6 tablets of 10 mg hydrocodone for a very long time.  He saw his pain management clinician yesterday, and he says she increased his dosing up to 7 tablets.  States he actually forgot about the Belbuca until we were talking today.  States it did work really well for pain for him, however he thinks there were some concerns about his blood pressure as well as the cost.  He also remembers some conversation about gabapentin, but he thought he had to stop all opioids completely in order to try this in the past.  He does not really remember the conversation fully.    Pain in his mouth/throat has been increasing.  Has found that mixing Janay-[place] with aspirin helps for several hours, so he is doing this about 3 times daily.  He also does find the Magic mouthwash is helpful, though finds he still wakes up overnight.    Has a history of difficulty with tolerating certain foods, and says it has been really hard to figure out why.  He says most fruits and vegetables do not agree with him, so he can only eat them in moderation.  His main food right now has been pancakes.  He states \"if it comes in a box, there is a really good chance I cannot tolerate it\".  He knows he has a sensitivity to dairy, however he does not have a known sensitivity to gluten or wheat, however still certain products will affect him.  He is worried about keeping up with his nutrition going forward as artificial sweeteners also can affect him, and he is worried about finding a protein supplement that he tolerates.    Prior to his cancer treatment, he had been working out close to 6 days a week.  He is found that keeping up with exercise really helps with his pain, particularly in his back.     Social History:  .  Social History     Tobacco Use    Smoking status: Former     Current packs/day: 0.00    "  Average packs/day: 1 pack/day for 18.0 years (18.0 ttl pk-yrs)     Types: Cigarettes     Start date: 1966     Quit date: 1984     Years since quittin.3     Passive exposure: Never    Smokeless tobacco: Never   Vaping Use    Vaping status: Never Used     Advanced Care Planning: Did not address today.  Will need to address at follow-up.    Family History- Reviewed in Epic.     Medications- Reviewed in Epic.    Past Medical History- Reviewed in [facility].    Past Surgical History- Reviewed in Epic.    Physical Exam:   Constitutional: Alert, pleasant, no apparent distress. Sitting up in chair.  Eyes: Sclera non-icteric, no eye discharge.  ENT: No nasal discharge. Ears grossly normal.  Respiratory: Unlabored respirations. Speaking in full sentences.  Musculoskeletal: Extremities appear normal- no gross deformities noted. No edema noted on upper body.   Skin: No suspicious lesions or rashes on visible skin.  Neurologic: Clear speech, no aphasia. No facial droop.  Psychiatric: Mentation appears normal, appropriate attention. Affect normal/bright. Does not appear anxious or depressed.    Key Data Reviewed:  LABS: 4/10/24- Cr 0.75, GFR >90, Albumin 4.1, LFTs wnl. WBC 6.1, Hgb 13.3, Plts 231.     IMAGING: PET scan 3/6/24- IMPRESSION:  1.  Hypermetabolic left base of tongue mass extending inferiorly into the vallecula, compatible with biopsy-proven squamous cell carcinoma. No evidence of FDG avid metastatic disease.  2.  Right upper lobe groundglass nodules/opacities with minimal FDG uptake. These may represent infectious foci versus groundglass nodules/adenocarcinoma spectrum lesions. Recommend attention on future oncologic imaging. Additional small solid pulmonary nodules are below the resolution of PET and can be followed on future imaging.    Impression & Recommendations & Counseling:  Donovan Yi is a 73 year old male with history of SCC of the tongue.    Introduced the role of palliative care as an  interdisciplinary team that cares for patients with serious illness to help support symptom management, communication, coping for patients and their families as well as support with medical decision making.    Pain - Longstanding history of chronic pain in his back, managed by [facility] pain clinic.  We talked a little bit about logistics of pain management.  I certainly would be happy to take over full management of his prescriptions while he is going through cancer treatment, however it can be that cumbersome for patients to have to switch back and forth between their clinicians.  He had forgotten about the Belbuca until we spoke today, and he remembered how it really did help his pain.  -He will check with his pain clinician about possibly resuming Belbuca or some other form of buprenorphine right now.  We talked about the utility of having a long-acting opioid on board, and then keeping his current dosing of the hydrocodone available for breakthrough pain, particularly if he gets worsening pain from his radiation treatments.  We discussed that this is likely to happen.  -He also thinks he will ask about the gabapentin again.  We discussed the utility of trying this, since it does work on a different pathway for pain than the other things he has been trying.  -Depending on this conversation, I can either consult with her or take over prescribing.  For now, I did not send any new prescriptions today.  -Suggested trying something topical, particularly if the muscle pain comes back.  Could try topical NSAID or capsaicin containing cream.    Dysphagia - Has been starting to happen, though generally able to manage at this point.  -He is not swallowing any of the Magic mouthwash, so I increased the dosing to every 4 hours as needed.  Suggested doing a dose prior to meals.  -He has a sample for [employer] protein shake, and I encouraged him to try that this weekend.  -We talked a little bit about the possibility of a  J-tube, which would bypass some of his stomach and intestines and perhaps allow for some better tolerance of protein supplements.      Follow up: 2 to 3 weeks         Total time spent on day of encounter is 77 mins, including reviewing record, review of above studies, above visit with patient, symptomatic discussion as above, including medication adjustments/prescription management, and documentation.       Some chart documentation performed using Dragon Voice recognition Software. Although reviewed after completion, some words and grammatical errors may remain.      Again, thank you for allowing me to participate in the care of your patient.      Sincerely,    Cheryl Atkins, DO

## 2024-04-17 ENCOUNTER — TELEPHONE (OUTPATIENT)
Dept: PALLIATIVE CARE | Facility: CLINIC | Age: 74
End: 2024-04-17

## 2024-04-17 ENCOUNTER — LAB (OUTPATIENT)
Dept: INFUSION THERAPY | Facility: HOSPITAL | Age: 74
End: 2024-04-17
Attending: INTERNAL MEDICINE
Payer: MEDICARE

## 2024-04-17 ENCOUNTER — APPOINTMENT (OUTPATIENT)
Dept: RADIATION ONCOLOGY | Facility: CLINIC | Age: 74
End: 2024-04-17
Attending: RADIOLOGY
Payer: MEDICARE

## 2024-04-17 ENCOUNTER — MYC MEDICAL ADVICE (OUTPATIENT)
Dept: PALLIATIVE CARE | Facility: CLINIC | Age: 74
End: 2024-04-17

## 2024-04-17 VITALS
SYSTOLIC BLOOD PRESSURE: 154 MMHG | RESPIRATION RATE: 16 BRPM | DIASTOLIC BLOOD PRESSURE: 88 MMHG | OXYGEN SATURATION: 97 % | WEIGHT: 180 LBS | TEMPERATURE: 98.4 F | HEART RATE: 67 BPM | BODY MASS INDEX: 25.83 KG/M2

## 2024-04-17 VITALS
OXYGEN SATURATION: 97 % | DIASTOLIC BLOOD PRESSURE: 88 MMHG | BODY MASS INDEX: 26.01 KG/M2 | HEART RATE: 67 BPM | SYSTOLIC BLOOD PRESSURE: 154 MMHG | WEIGHT: 181.3 LBS | TEMPERATURE: 98.4 F | RESPIRATION RATE: 16 BRPM

## 2024-04-17 DIAGNOSIS — C01 MALIGNANT NEOPLASM OF BASE OF TONGUE (H): Primary | ICD-10-CM

## 2024-04-17 DIAGNOSIS — C01 PRIMARY SQUAMOUS CELL CARCINOMA OF BASE OF TONGUE (H): Primary | ICD-10-CM

## 2024-04-17 DIAGNOSIS — G47.9 DIFFICULTY SLEEPING: ICD-10-CM

## 2024-04-17 LAB
ALBUMIN SERPL BCG-MCNC: 4 G/DL (ref 3.5–5.2)
ALP SERPL-CCNC: 67 U/L (ref 40–150)
ALT SERPL W P-5'-P-CCNC: 22 U/L (ref 0–70)
ANION GAP SERPL CALCULATED.3IONS-SCNC: 10 MMOL/L (ref 7–15)
AST SERPL W P-5'-P-CCNC: 21 U/L (ref 0–45)
BASOPHILS # BLD AUTO: 0 10E3/UL (ref 0–0.2)
BASOPHILS NFR BLD AUTO: 1 %
BILIRUB SERPL-MCNC: 0.5 MG/DL
BUN SERPL-MCNC: 18.4 MG/DL (ref 8–23)
CALCIUM SERPL-MCNC: 8.8 MG/DL (ref 8.8–10.2)
CHLORIDE SERPL-SCNC: 102 MMOL/L (ref 98–107)
CREAT SERPL-MCNC: 0.74 MG/DL (ref 0.67–1.17)
DEPRECATED HCO3 PLAS-SCNC: 28 MMOL/L (ref 22–29)
EGFRCR SERPLBLD CKD-EPI 2021: >90 ML/MIN/1.73M2
EOSINOPHIL # BLD AUTO: 0.1 10E3/UL (ref 0–0.7)
EOSINOPHIL NFR BLD AUTO: 2 %
ERYTHROCYTE [DISTWIDTH] IN BLOOD BY AUTOMATED COUNT: 13 % (ref 10–15)
GLUCOSE SERPL-MCNC: 142 MG/DL (ref 70–99)
HCT VFR BLD AUTO: 39.5 % (ref 40–53)
HGB BLD-MCNC: 13.2 G/DL (ref 13.3–17.7)
IMM GRANULOCYTES # BLD: 0 10E3/UL
IMM GRANULOCYTES NFR BLD: 0 %
LYMPHOCYTES # BLD AUTO: 0.6 10E3/UL (ref 0.8–5.3)
LYMPHOCYTES NFR BLD AUTO: 9 %
MAGNESIUM SERPL-MCNC: 1.8 MG/DL (ref 1.7–2.3)
MCH RBC QN AUTO: 29.9 PG (ref 26.5–33)
MCHC RBC AUTO-ENTMCNC: 33.4 G/DL (ref 31.5–36.5)
MCV RBC AUTO: 89 FL (ref 78–100)
MONOCYTES # BLD AUTO: 0.5 10E3/UL (ref 0–1.3)
MONOCYTES NFR BLD AUTO: 8 %
NEUTROPHILS # BLD AUTO: 5.1 10E3/UL (ref 1.6–8.3)
NEUTROPHILS NFR BLD AUTO: 80 %
NRBC # BLD AUTO: 0 10E3/UL
NRBC BLD AUTO-RTO: 0 /100
PLATELET # BLD AUTO: 198 10E3/UL (ref 150–450)
POTASSIUM SERPL-SCNC: 3.6 MMOL/L (ref 3.4–5.3)
PROT SERPL-MCNC: 6.6 G/DL (ref 6.4–8.3)
RBC # BLD AUTO: 4.42 10E6/UL (ref 4.4–5.9)
SODIUM SERPL-SCNC: 140 MMOL/L (ref 135–145)
WBC # BLD AUTO: 6.4 10E3/UL (ref 4–11)

## 2024-04-17 PROCEDURE — 96413 CHEMO IV INFUSION 1 HR: CPT

## 2024-04-17 PROCEDURE — 77427 RADIATION TX MANAGEMENT X5: CPT | Performed by: RADIOLOGY

## 2024-04-17 PROCEDURE — 96375 TX/PRO/DX INJ NEW DRUG ADDON: CPT

## 2024-04-17 PROCEDURE — 96367 TX/PROPH/DG ADDL SEQ IV INF: CPT

## 2024-04-17 PROCEDURE — 258N000003 HC RX IP 258 OP 636: Performed by: INTERNAL MEDICINE

## 2024-04-17 PROCEDURE — 80053 COMPREHEN METABOLIC PANEL: CPT | Performed by: INTERNAL MEDICINE

## 2024-04-17 PROCEDURE — 36415 COLL VENOUS BLD VENIPUNCTURE: CPT | Performed by: INTERNAL MEDICINE

## 2024-04-17 PROCEDURE — 77386 HC IMRT TREATMENT DELIVERY, COMPLEX: CPT | Performed by: RADIOLOGY

## 2024-04-17 PROCEDURE — 83735 ASSAY OF MAGNESIUM: CPT | Performed by: INTERNAL MEDICINE

## 2024-04-17 PROCEDURE — 250N000011 HC RX IP 250 OP 636: Performed by: INTERNAL MEDICINE

## 2024-04-17 PROCEDURE — 85048 AUTOMATED LEUKOCYTE COUNT: CPT | Performed by: INTERNAL MEDICINE

## 2024-04-17 PROCEDURE — 77336 RADIATION PHYSICS CONSULT: CPT | Performed by: RADIOLOGY

## 2024-04-17 PROCEDURE — 77014 PR CT GUIDE FOR PLACEMENT RADIATION THERAPY FIELDS: CPT | Mod: 26 | Performed by: RADIOLOGY

## 2024-04-17 RX ORDER — MEPERIDINE HYDROCHLORIDE 50 MG/ML
25 INJECTION INTRAMUSCULAR; INTRAVENOUS; SUBCUTANEOUS EVERY 30 MIN PRN
Status: DISCONTINUED | OUTPATIENT
Start: 2024-04-17 | End: 2024-04-17 | Stop reason: HOSPADM

## 2024-04-17 RX ORDER — PALONOSETRON 0.05 MG/ML
0.25 INJECTION, SOLUTION INTRAVENOUS ONCE
Status: COMPLETED | OUTPATIENT
Start: 2024-04-17 | End: 2024-04-17

## 2024-04-17 RX ORDER — ALBUTEROL SULFATE 0.83 MG/ML
2.5 SOLUTION RESPIRATORY (INHALATION)
Status: DISCONTINUED | OUTPATIENT
Start: 2024-04-17 | End: 2024-04-17 | Stop reason: HOSPADM

## 2024-04-17 RX ORDER — EPINEPHRINE 1 MG/ML
0.3 INJECTION, SOLUTION INTRAMUSCULAR; SUBCUTANEOUS EVERY 5 MIN PRN
Status: DISCONTINUED | OUTPATIENT
Start: 2024-04-17 | End: 2024-04-17 | Stop reason: HOSPADM

## 2024-04-17 RX ORDER — ALBUTEROL SULFATE 90 UG/1
1-2 AEROSOL, METERED RESPIRATORY (INHALATION)
Status: DISCONTINUED | OUTPATIENT
Start: 2024-04-17 | End: 2024-04-17 | Stop reason: HOSPADM

## 2024-04-17 RX ORDER — LORAZEPAM 2 MG/ML
0.5 INJECTION INTRAMUSCULAR EVERY 4 HOURS PRN
Status: DISCONTINUED | OUTPATIENT
Start: 2024-04-17 | End: 2024-04-17 | Stop reason: HOSPADM

## 2024-04-17 RX ORDER — METHYLPREDNISOLONE SODIUM SUCCINATE 125 MG/2ML
125 INJECTION, POWDER, LYOPHILIZED, FOR SOLUTION INTRAMUSCULAR; INTRAVENOUS
Status: DISCONTINUED | OUTPATIENT
Start: 2024-04-17 | End: 2024-04-17 | Stop reason: HOSPADM

## 2024-04-17 RX ORDER — DIPHENHYDRAMINE HYDROCHLORIDE 50 MG/ML
50 INJECTION INTRAMUSCULAR; INTRAVENOUS
Status: DISCONTINUED | OUTPATIENT
Start: 2024-04-17 | End: 2024-04-17 | Stop reason: HOSPADM

## 2024-04-17 RX ORDER — DOXEPIN HYDROCHLORIDE 10 MG/ML
3-10 SOLUTION ORAL AT BEDTIME
Qty: 30 ML | Refills: 3 | Status: SHIPPED | OUTPATIENT
Start: 2024-04-17 | End: 2024-07-26

## 2024-04-17 RX ADMIN — FOSAPREPITANT: 150 INJECTION, POWDER, LYOPHILIZED, FOR SOLUTION INTRAVENOUS at 10:43

## 2024-04-17 RX ADMIN — SODIUM CHLORIDE 81 MG: 9 INJECTION, SOLUTION INTRAVENOUS at 11:28

## 2024-04-17 RX ADMIN — SODIUM CHLORIDE 1000 ML: 9 INJECTION, SOLUTION INTRAVENOUS at 10:16

## 2024-04-17 RX ADMIN — PALONOSETRON 0.25 MG: 0.05 INJECTION, SOLUTION INTRAVENOUS at 10:16

## 2024-04-17 ASSESSMENT — PAIN SCALES - GENERAL: PAINLEVEL: MILD PAIN (2)

## 2024-04-17 NOTE — TELEPHONE ENCOUNTER
Pt requesting to have something prescribed for sleep. Dr. Atkins would like him to try doxepin. Dosing instructions provided to pt via [employer].    KEYON AbrahamN, RN  Palliative Care Nurse Clinician    [phone] (Direct)  [phone] (Main)  [phone] (Appointment Scheduling)

## 2024-04-17 NOTE — LETTER
2024         RE: Donovan Yi  [address] [address]        Dear Colleague,    Thank you for referring your patient, Donovan Yi, to the [facility] [place]. Please see a copy of my visit note below.    RADIATION ONCOLOGY WEEKLY TREATMENT VISIT NOTE      Assessment / Impression       Visit Dx:  (C01) Malignant neoplasm of base of tongue (H)  (primary encounter diagnosis)     Tolerating radiation therapy well.  All questions and concerns addressed.    Plan:     Continue radiation treatment as prescribed.    Reviewed CBCT today.  There is a good tumor response.    Discussed with the patient about nutritional support.  The patient so far has no issues of dysphagia.    Subjective:      HPI: Donovan Yi is a 73 year old male with  Malignant neoplasm of base of tongue (H) [C01]    The following portions of the patient's history were reviewed and updated as appropriate: allergies, current medications, past family history, past medical history, past social history, past surgical history and problem list.    Assessment                  Body Site:  Head and Neck Site: BOT  Stereotactic Radiosurgery: No  Concurrent Therapy: Yes (weekly cisplatin)  Today's Dose: 2000  Total Dose for Head and Neck: 7000  Today's Fraction/Total Fraction Head and Neck: 10/35  Mucositis due to radiation: 0: None  Thrush: 0: Absent  Pharynx and Esphogaus: 1: Tolerates regular diet  Voice Chances/Stridor/Larynx: 0: Normal  Ocular/Visual - other : 0: Normal  Middle ear/hearin: Normal  Tinnitus: 0: No  Cough: 0: Absent                                   Sexuality Alteration                    Emotional Alteration    Copin: Ineffective  Comfort Alteration   KPS: 90% Can perform normal activity, minor signs of disease  Fatigue (ONS scale): 0: No Fatigue  Pain Location: chronic low back  Pain Intensity. Rate degree of pain ranging from 0 (no pain) to 10  (severe pain): 0-3  Pain Description: Ache - Muscular type ache  Pain Intervention: 3: Opiods  Effectiveness of pain intervention: 3: Pain relieved 75%   Nutrition Alteration   Anorexia: 0: None  Nausea: 0: None  Vomitin: None  Weight: 82.2 kg (181 lb 4.8 oz)  Pharynx and Esphogaus: 1: Tolerates regular diet  Skin Alteration   Skin Sensation: 0: No problem  Skin Reaction: 0: None  AUA Assessment                                           Accompanied by       Objective:     Exam: no Erythema.    Vitals:    24 1329   BP: (!) 154/88   Pulse: 67   Resp: 16   Temp: 98.4  F (36.9  C)   SpO2: 97%   Weight: 82.2 kg (181 lb 4.8 oz)   PainSc: Mild Pain (2)   PainLoc: Low Back       Wt Readings from Last 8 Encounters:   24 82.2 kg (181 lb 4.8 oz)   24 81.6 kg (180 lb)   04/10/24 82.3 kg (181 lb 6.4 oz)   04/10/24 82.6 kg (182 lb)   24 82.4 kg (181 lb 11.2 oz)   24 82.6 kg (182 lb)   03/15/24 83 kg (182 lb 14.4 oz)   24 83.3 kg (183 lb 11.2 oz)       General: Alert and oriented, in no acute distress  Christopher has no Erythema.  Aria chart and setup information reviewed    Joyce Yang MD      Again, thank you for allowing me to participate in the care of your patient.        Sincerely,        Joyce Yang MD

## 2024-04-17 NOTE — PROGRESS NOTES
Infusion Nursing Note:  Donovan Yi presents today for D15C1 cisplatin.    Patient seen by provider today: No   present during visit today: Not Applicable.    Note: Pt given aloxi, emend/dex, and 1 liter saline as pre meds.      Intravenous Access:  Peripheral IV placed.    Treatment Conditions:  Lab Results   Component Value Date    HGB 13.2 (L) 04/17/2024    WBC 6.4 04/17/2024    ANEU 4.3 01/16/2006    ANEUTAUTO 5.1 04/17/2024     04/17/2024        Lab Results   Component Value Date     04/17/2024    POTASSIUM 3.6 04/17/2024    MAG 1.8 04/17/2024    CR 0.74 04/17/2024    EMILY 8.8 04/17/2024    BILITOTAL 0.5 04/17/2024    ALBUMIN 4.0 04/17/2024    ALT 22 04/17/2024    AST 21 04/17/2024       Results reviewed, labs MET treatment parameters, ok to proceed with treatment.      Post Infusion Assessment:  Patient tolerated infusion without incident.   Pt will go to radiation after this appt    Discharge Plan:   Patient and/or family verbalized understanding of discharge instructions and all questions answered.      Saira Trejo RN

## 2024-04-17 NOTE — PROGRESS NOTES
RADIATION ONCOLOGY WEEKLY TREATMENT VISIT NOTE      Assessment / Impression       Visit Dx:  (C01) Malignant neoplasm of base of tongue (H)  (primary encounter diagnosis)     Tolerating radiation therapy well.  All questions and concerns addressed.    Plan:     Continue radiation treatment as prescribed.    Reviewed CBCT today.  There is a good tumor response.    Discussed with the patient about nutritional support.  The patient so far has no issues of dysphagia.    Subjective:      HPI: Donovan Yi is a 73 year old male with  Malignant neoplasm of base of tongue (H) [C01]    The following portions of the patient's history were reviewed and updated as appropriate: allergies, current medications, past family history, past medical history, past social history, past surgical history and problem list.    Assessment                  Body Site:  Head and Neck Site: BOT  Stereotactic Radiosurgery: No  Concurrent Therapy: Yes (weekly cisplatin)  Today's Dose: 2000  Total Dose for Head and Neck: 7000  Today's Fraction/Total Fraction Head and Neck: 10/35  Mucositis due to radiation: 0: None  Thrush: 0: Absent  Pharynx and Esphogaus: 1: Tolerates regular diet  Voice Chances/Stridor/Larynx: 0: Normal  Ocular/Visual - other : 0: Normal  Middle ear/hearin: Normal  Tinnitus: 0: No  Cough: 0: Absent                                   Sexuality Alteration                    Emotional Alteration    Copin: Ineffective  Comfort Alteration   KPS: 90% Can perform normal activity, minor signs of disease  Fatigue (ONS scale): 0: No Fatigue  Pain Location: chronic low back  Pain Intensity. Rate degree of pain ranging from 0 (no pain) to 10 (severe pain): 0-3  Pain Description: Ache - Muscular type ache  Pain Intervention: 3: Opiods  Effectiveness of pain intervention: 3: Pain relieved 75%   Nutrition Alteration   Anorexia: 0: None  Nausea: 0: None  Vomitin: None  Weight: 82.2 kg (181 lb 4.8 oz)  Pharynx and  Esphogaus: 1: Tolerates regular diet  Skin Alteration   Skin Sensation: 0: No problem  Skin Reaction: 0: None  AUA Assessment                                           Accompanied by       Objective:     Exam: no Erythema.    Vitals:    04/17/24 1329   BP: (!) 154/88   Pulse: 67   Resp: 16   Temp: 98.4  F (36.9  C)   SpO2: 97%   Weight: 82.2 kg (181 lb 4.8 oz)   PainSc: Mild Pain (2)   PainLoc: Low Back       Wt Readings from Last 8 Encounters:   04/17/24 82.2 kg (181 lb 4.8 oz)   04/17/24 81.6 kg (180 lb)   04/10/24 82.3 kg (181 lb 6.4 oz)   04/10/24 82.6 kg (182 lb)   04/03/24 82.4 kg (181 lb 11.2 oz)   03/18/24 82.6 kg (182 lb)   03/15/24 83 kg (182 lb 14.4 oz)   02/28/24 83.3 kg (183 lb 11.2 oz)       General: Alert and oriented, in no acute distress  Christopher has no Erythema.  Aria chart and setup information reviewed    Joyce Yang MD

## 2024-04-18 ENCOUNTER — APPOINTMENT (OUTPATIENT)
Dept: RADIATION ONCOLOGY | Facility: CLINIC | Age: 74
End: 2024-04-18
Attending: RADIOLOGY
Payer: MEDICARE

## 2024-04-18 PROCEDURE — 77014 PR CT GUIDE FOR PLACEMENT RADIATION THERAPY FIELDS: CPT | Mod: 26 | Performed by: STUDENT IN AN ORGANIZED HEALTH CARE EDUCATION/TRAINING PROGRAM

## 2024-04-18 PROCEDURE — 77386 HC IMRT TREATMENT DELIVERY, COMPLEX: CPT | Performed by: STUDENT IN AN ORGANIZED HEALTH CARE EDUCATION/TRAINING PROGRAM

## 2024-04-18 NOTE — TELEPHONE ENCOUNTER
Retail Pharmacy Prior Authorization Team   Phone: [phone]    PA Initiation    Medication: DOXEPIN HCL 10 MG/ML PO CONC  Insurance Company: [employer] - Phone [phone] Fax [phone]  [facility] the [id number] [place], [place] - [address] [facility] [address]  Filling Pharmacy Phone: [phone]  Filling Pharmacy Fax: [phone]  Start Date: 4/17/2024

## 2024-04-19 ENCOUNTER — TELEPHONE (OUTPATIENT)
Dept: RADIATION ONCOLOGY | Facility: CLINIC | Age: 74
End: 2024-04-19
Payer: MEDICARE

## 2024-04-19 ENCOUNTER — OFFICE VISIT (OUTPATIENT)
Dept: RADIATION ONCOLOGY | Facility: CLINIC | Age: 74
End: 2024-04-19
Attending: RADIOLOGY
Payer: MEDICARE

## 2024-04-19 ENCOUNTER — MYC MEDICAL ADVICE (OUTPATIENT)
Dept: PALLIATIVE CARE | Facility: CLINIC | Age: 74
End: 2024-04-19
Payer: MEDICARE

## 2024-04-19 VITALS
RESPIRATION RATE: 16 BRPM | OXYGEN SATURATION: 99 % | BODY MASS INDEX: 25.78 KG/M2 | HEART RATE: 67 BPM | TEMPERATURE: 98.1 F | DIASTOLIC BLOOD PRESSURE: 74 MMHG | SYSTOLIC BLOOD PRESSURE: 163 MMHG | WEIGHT: 179.7 LBS

## 2024-04-19 DIAGNOSIS — G89.3 CANCER RELATED PAIN: ICD-10-CM

## 2024-04-19 DIAGNOSIS — C01 MALIGNANT NEOPLASM OF BASE OF TONGUE (H): Primary | ICD-10-CM

## 2024-04-19 RX ORDER — GABAPENTIN 300 MG/1
CAPSULE ORAL
Qty: 60 CAPSULE | Refills: 1 | Status: SHIPPED | OUTPATIENT
Start: 2024-04-19 | End: 2024-07-26

## 2024-04-19 ASSESSMENT — PAIN SCALES - GENERAL: PAINLEVEL: MODERATE PAIN (4)

## 2024-04-19 NOTE — PROGRESS NOTES
RADIATION ONCOLOGY WEEKLY TREATMENT VISIT NOTE      Assessment / Impression       Visit Dx:  (C01) Malignant neoplasm of base of tongue (H)  (primary encounter diagnosis)       Cancer Staging   No matching staging information was found for the patient.       Tolerating radiation therapy well.  All questions and concerns addressed.  Missed treatment today due to pain  No thrush  Plan:     Continue radiation treatment as prescribed.  Radiation:   Site: BOT  Stereotactic Radiosurgery: No  Concurrent Therapy: Yes (weekly cisplatin)  Today's Dose: 2200  Total Dose for Head and Neck: 7000  Today's Fraction/Total Fraction Head and Neck: 11/35    Continue salt and soda and Biotene.  Continue heliosis.  Has hydrocodone/Tylenol which she is on for chronic back pain  Palliative care prescribed additional medications for pain management which he will begin once he has received them from pharmacy:  Belbuca  Gabapentin  Magic mouthwash    Pain Management Plan: Above    Subjective:      HPI: Donovan Yi is a 73 year old male with  Malignant neoplasm of base of tongue (H) [C01]    He has been tolerating radiation therapy but noticed an increase in pain primarily last night.  He reports currently pain is 4 out of 10 in intensity.  Palliative care prescribed additional occasions for pain management however he has not picked these up from pharmacy yet so has not started them.  He feels following his left lower molar extraction more irritation and pain.  This extraction site is healed well with no exposed bone.  Pain was so bad last night and this morning that he did not come for treatment today.  For thick secretions he is doing salt water rinses.  He also is using Biotene and heliosis.    The following portions of the patient's history were reviewed and updated as appropriate: allergies, current medications, past family history, past medical history, past social history, past surgical history and problem  list.    Assessment                  Body Site:  Head and Neck Site: BOT  Stereotactic Radiosurgery: No  Concurrent Therapy: Yes (weekly cisplatin)  Today's Dose: 2200  Total Dose for Head and Neck: 7000  Today's Fraction/Total Fraction Head and Neck:   Mucositis due to radiation: 0: None  Thrush: 0: Absent  Pharynx and Esphogaus: 1: Tolerates regular diet  Voice Chances/Stridor/Larynx: 0: Normal  Ocular/Visual - other : 0: Normal  Middle ear/hearin: Normal  Tinnitus: 0: No  Cough: 0: Absent                                   Sexuality Alteration                    Emotional Alteration    Copin: Ineffective  Comfort Alteration   KPS: 90% Can perform normal activity, minor signs of disease  Fatigue (ONS scale): 0: No Fatigue  Pain Location: mouth  Pain Intensity. Rate degree of pain ranging from 0 (no pain) to 10 (severe pain): 4-5  Pain Description: Ache - Muscular type ache  Pain Intervention: 3: Opiods (hydrocodone, gabapentin, belbuca, magic mouthwash)  Effectiveness of pain intervention: 2: Pain relieved 50%   Nutrition Alteration   Anorexia: 0: None  Nausea: 0: None  Vomitin: None  Weight: 81.5 kg (179 lb 11.2 oz)  Pharynx and Esphogaus: 1: Tolerates regular diet  Skin Alteration   Skin Sensation: 0: No problem  Skin Reaction: 0: None  AUA Assessment                                           Accompanied by       Objective:     Exam:     Vitals:    24 1508   BP: (!) 163/74   Pulse: 67   Resp: 16   Temp: 98.1  F (36.7  C)   TempSrc: Oral   SpO2: 99%   Weight: 81.5 kg (179 lb 11.2 oz)   PainSc: Moderate Pain (4)   PainLoc: Other - see comment       Wt Readings from Last 8 Encounters:   24 81.5 kg (179 lb 11.2 oz)   24 82.2 kg (181 lb 4.8 oz)   24 81.6 kg (180 lb)   04/10/24 82.3 kg (181 lb 6.4 oz)   04/10/24 82.6 kg (182 lb)   24 82.4 kg (181 lb 11.2 oz)   24 82.6 kg (182 lb)   03/15/24 83 kg (182 lb 14.4 oz)       General: Alert and oriented, in no  acute distress  Donovan has no Erythema.  Early mucositis changes with thickened secretions.  No thrush.    Treatment Summary to Date    Aria chart and setup information reviewed    Evelyn Victor MD

## 2024-04-19 NOTE — TELEPHONE ENCOUNTER
Reviewed message with Dr. Atkins. Pt should restart belbuca 150 mg BID x 1 day, then 300 mg BID. Pt also to add in gabapentin 300 mg at bedtime. This can be adjusted if needed. Reviewed these instructions with pts wife.     KEYON AbrahamN, RN  Palliative Care Nurse Clinician    [phone] (Direct)  [phone] (Main)  [phone] (Appointment Scheduling)

## 2024-04-19 NOTE — LETTER
4/19/2024         RE: Donovan Yi  [address] [place] [address]        Dear Colleague,    Thank you for referring your patient, Donovan Yi, to the [facility] RADIATION ONCOL[facility] [place]. Please see a copy of my visit note below.    RADIATION ONCOLOGY WEEKLY TREATMENT VISIT NOTE      Assessment / Impression       Visit Dx:  (C01) Malignant neoplasm of base of tongue (H)  (primary encounter diagnosis)       Cancer Staging   No matching staging information was found for the patient.       Tolerating radiation therapy well.  All questions and concerns addressed.  Missed treatment today due to pain  No thrush  Plan:     Continue radiation treatment as prescribed.  Radiation:   Site: BOT  Stereotactic Radiosurgery: No  Concurrent Therapy: Yes (weekly cisplatin)  Today's Dose: 2200  Total Dose for Head and Neck: 7000  Today's Fraction/Total Fraction Head and Neck: 11/35    Continue salt and soda and Biotene.  Continue heliosis.  Has hydrocodone/Tylenol which she is on for chronic back pain  Palliative care prescribed additional medications for pain management which he will begin once he has received them from pharmacy:  Belbuca  Gabapentin  Magic mouthwash    Pain Management Plan: Above    Subjective:      HPI: Donovan Yi is a 73 year old male with  Malignant neoplasm of base of tongue (H) [C01]    He has been tolerating radiation therapy but noticed an increase in pain primarily last night.  He reports currently pain is 4 out of 10 in intensity.  Palliative care prescribed additional occasions for pain management however he has not picked these up from pharmacy yet so has not started them.  He feels following his left lower molar extraction more irritation and pain.  This extraction site is healed well with no exposed bone.  Pain was so bad last night and this morning that he did not come for treatment today.  For thick secretions he is doing salt water rinses.  He  also is using Biotene and heliosis.    The following portions of the patient's history were reviewed and updated as appropriate: allergies, current medications, past family history, past medical history, past social history, past surgical history and problem list.    Assessment                  Body Site:  Head and Neck Site: BOT  Stereotactic Radiosurgery: No  Concurrent Therapy: Yes (weekly cisplatin)  Today's Dose: 2200  Total Dose for Head and Neck: 7000  Today's Fraction/Total Fraction Head and Neck:   Mucositis due to radiation: 0: None  Thrush: 0: Absent  Pharynx and Esphogaus: 1: Tolerates regular diet  Voice Chances/Stridor/Larynx: 0: Normal  Ocular/Visual - other : 0: Normal  Middle ear/hearin: Normal  Tinnitus: 0: No  Cough: 0: Absent                                   Sexuality Alteration                    Emotional Alteration    Copin: Ineffective  Comfort Alteration   KPS: 90% Can perform normal activity, minor signs of disease  Fatigue (ONS scale): 0: No Fatigue  Pain Location: mouth  Pain Intensity. Rate degree of pain ranging from 0 (no pain) to 10 (severe pain): 4-5  Pain Description: Ache - Muscular type ache  Pain Intervention: 3: Opiods (hydrocodone, gabapentin, belbuca, magic mouthwash)  Effectiveness of pain intervention: 2: Pain relieved 50%   Nutrition Alteration   Anorexia: 0: None  Nausea: 0: None  Vomitin: None  Weight: 81.5 kg (179 lb 11.2 oz)  Pharynx and Esphogaus: 1: Tolerates regular diet  Skin Alteration   Skin Sensation: 0: No problem  Skin Reaction: 0: None  AUA Assessment                                           Accompanied by       Objective:     Exam:     Vitals:    24 1508   BP: (!) 163/74   Pulse: 67   Resp: 16   Temp: 98.1  F (36.7  C)   TempSrc: Oral   SpO2: 99%   Weight: 81.5 kg (179 lb 11.2 oz)   PainSc: Moderate Pain (4)   PainLoc: Other - see comment       Wt Readings from Last 8 Encounters:   24 81.5 kg (179 lb 11.2 oz)    04/17/24 82.2 kg (181 lb 4.8 oz)   04/17/24 81.6 kg (180 lb)   04/10/24 82.3 kg (181 lb 6.4 oz)   04/10/24 82.6 kg (182 lb)   04/03/24 82.4 kg (181 lb 11.2 oz)   03/18/24 82.6 kg (182 lb)   03/15/24 83 kg (182 lb 14.4 oz)       General: Alert and oriented, in no acute distress  Christopher has no Erythema.  Early mucositis changes with thickened secretions.  No thrush.    Treatment Summary to Date    Aria chart and setup information reviewed    Evelyn Victor MD      Again, thank you for allowing me to participate in the care of your patient.        Sincerely,        Evelyn Victor MD

## 2024-04-19 NOTE — TELEPHONE ENCOUNTER
Received voicemail message from patient's wife Karla stating that patient was in to much pain in his jaw and mouth to come and get his treatment for his BOT cancer today.  Karla states that there had been discussion about temporary use of morphine thru treatment with Dr. Yang.    Patient does have chronic pain and is seen by the pain clinic with a contract.  Patient has also been seeing palliative care.    Called Karla and let her know that Dr. Yang is out of clinic today but if they wanted to come and see his partner Dr. Victor for evaluation and prescriptions they could do that but they would need a visit to make sure there is nothing else happening causing that pain.  Patient had also reached out to palliative care and has not heard back from them as yet.  Karla not sure that patient would be able to come due to pain.  Instructed Karla if needed they could go to the ER for evaluation or call 911 if he is unable to get out of the house.  Karla will talk with patient and call back should he want a visit with the radiation oncologist today.

## 2024-04-19 NOTE — TELEPHONE ENCOUNTER
Pt will plan to use a [employer] coupon for this prescription. Pt's wife familiar with using these and will discuss with Dylon.    KEYON AbrahamN, RN  Palliative Care Nurse Clinician    [phone] (Direct)  [phone] (Main)  [phone] (Appointment Scheduling)

## 2024-04-19 NOTE — TELEPHONE ENCOUNTER
PRIOR AUTHORIZATION DENIED    Medication: DOXEPIN HCL 10 MG/ML PO CONC  Insurance Company: [employer] - Phone [phone] Fax [phone]  Denial Date: 4/18/2024  Denial Reason(s):       Appeal Information:

## 2024-04-22 ENCOUNTER — APPOINTMENT (OUTPATIENT)
Dept: RADIATION ONCOLOGY | Facility: CLINIC | Age: 74
End: 2024-04-22
Attending: RADIOLOGY
Payer: MEDICARE

## 2024-04-22 PROCEDURE — 77386 HC IMRT TREATMENT DELIVERY, COMPLEX: CPT | Performed by: STUDENT IN AN ORGANIZED HEALTH CARE EDUCATION/TRAINING PROGRAM

## 2024-04-22 PROCEDURE — 77014 PR CT GUIDE FOR PLACEMENT RADIATION THERAPY FIELDS: CPT | Mod: 26 | Performed by: STUDENT IN AN ORGANIZED HEALTH CARE EDUCATION/TRAINING PROGRAM

## 2024-04-23 ENCOUNTER — MYC MEDICAL ADVICE (OUTPATIENT)
Dept: ADMINISTRATIVE | Facility: CLINIC | Age: 74
End: 2024-04-23

## 2024-04-23 ENCOUNTER — APPOINTMENT (OUTPATIENT)
Dept: RADIATION ONCOLOGY | Facility: CLINIC | Age: 74
End: 2024-04-23
Attending: RADIOLOGY
Payer: MEDICARE

## 2024-04-23 DIAGNOSIS — I10 ESSENTIAL HYPERTENSION: ICD-10-CM

## 2024-04-23 PROCEDURE — 77014 PR CT GUIDE FOR PLACEMENT RADIATION THERAPY FIELDS: CPT | Mod: 26 | Performed by: RADIOLOGY

## 2024-04-23 PROCEDURE — 77386 HC IMRT TREATMENT DELIVERY, COMPLEX: CPT

## 2024-04-23 RX ORDER — LISINOPRIL 10 MG/1
20 TABLET ORAL DAILY
Qty: 90 TABLET | Refills: 1 | Status: SHIPPED | OUTPATIENT
Start: 2024-04-23 | End: 2024-05-09 | Stop reason: ALTCHOICE

## 2024-04-23 NOTE — TELEPHONE ENCOUNTER
Routing refill request to provider for review/approval because:  Labs out of range:  Blood pressure    Last Written Prescription Date:  08/23/2023  Last Fill Quantity: 90,  # refills: 1   Last office visit provider:  02/14/2024     Requested Prescriptions   Pending Prescriptions Disp Refills    lisinopril (ZESTRIL) 10 MG tablet 90 tablet 1     Sig: Take 2 tablets (20 mg) by mouth daily       ACE Inhibitors (Including Combos) Protocol Failed - 4/23/2024  9:49 AM        Failed - Blood pressure under 140/90 in past 12 months- Clinicial or Patient Reported     BP Readings from Last 3 Encounters:   04/19/24 (!) 163/74   04/17/24 (!) 154/88   04/17/24 (!) 154/88       No data recorded            Failed - Recent (12 mo) or future (90 days) visit within the authorizing provider's specialty     The patient must have completed an in-person or virtual visit within the past 12 months or has a future visit scheduled within the next 90 days with the authorizing provider s specialty.  Urgent care and e-visits do not quality as an office visit for this protocol.          Passed - Medication is active on med list        Passed - Medication indicated for associated diagnosis     Medication is associated with one or more of the following diagnoses:     Chronic Kidney Disease (CKD)   Coronary Artery Disease (CAD)   Diabetes   Heart Failure (HF)   Hypertension (HTN)   Nephropathy            Passed - Has GFR on file in past 12 months and most recent value is normal        Passed - Patient is age 18 or older        Passed - Normal serum creatinine on file in past 12 months     Recent Labs   Lab Test 04/17/24  0926   CR 0.74                 Passed - Normal serum potassium on file in past 12 months     Recent Labs   Lab Test 04/17/24  0926   POTASSIUM 3.6                  Hafsa Dey RN 04/23/24 9:51 AM

## 2024-04-24 ENCOUNTER — ONCOLOGY VISIT (OUTPATIENT)
Dept: ONCOLOGY | Facility: HOSPITAL | Age: 74
End: 2024-04-24
Attending: INTERNAL MEDICINE
Payer: MEDICARE

## 2024-04-24 ENCOUNTER — APPOINTMENT (OUTPATIENT)
Dept: RADIATION ONCOLOGY | Facility: CLINIC | Age: 74
End: 2024-04-24
Attending: RADIOLOGY
Payer: MEDICARE

## 2024-04-24 ENCOUNTER — LAB (OUTPATIENT)
Dept: INFUSION THERAPY | Facility: HOSPITAL | Age: 74
End: 2024-04-24
Attending: INTERNAL MEDICINE
Payer: MEDICARE

## 2024-04-24 VITALS
OXYGEN SATURATION: 100 % | WEIGHT: 177 LBS | BODY MASS INDEX: 25.76 KG/M2 | HEART RATE: 77 BPM | SYSTOLIC BLOOD PRESSURE: 172 MMHG | DIASTOLIC BLOOD PRESSURE: 81 MMHG | TEMPERATURE: 97.6 F | RESPIRATION RATE: 16 BRPM

## 2024-04-24 VITALS
DIASTOLIC BLOOD PRESSURE: 73 MMHG | OXYGEN SATURATION: 98 % | TEMPERATURE: 97.9 F | BODY MASS INDEX: 25.47 KG/M2 | SYSTOLIC BLOOD PRESSURE: 144 MMHG | HEART RATE: 75 BPM | HEIGHT: 70 IN | RESPIRATION RATE: 16 BRPM | WEIGHT: 177.9 LBS

## 2024-04-24 DIAGNOSIS — C01 MALIGNANT NEOPLASM OF BASE OF TONGUE (H): Primary | ICD-10-CM

## 2024-04-24 DIAGNOSIS — C01 PRIMARY SQUAMOUS CELL CARCINOMA OF BASE OF TONGUE (H): Primary | ICD-10-CM

## 2024-04-24 LAB
ALBUMIN SERPL BCG-MCNC: 3.9 G/DL (ref 3.5–5.2)
ALP SERPL-CCNC: 67 U/L (ref 40–150)
ALT SERPL W P-5'-P-CCNC: 15 U/L (ref 0–70)
ANION GAP SERPL CALCULATED.3IONS-SCNC: 10 MMOL/L (ref 7–15)
AST SERPL W P-5'-P-CCNC: 18 U/L (ref 0–45)
BASOPHILS # BLD AUTO: 0 10E3/UL (ref 0–0.2)
BASOPHILS NFR BLD AUTO: 1 %
BILIRUB SERPL-MCNC: 0.4 MG/DL
BUN SERPL-MCNC: 20.6 MG/DL (ref 8–23)
CALCIUM SERPL-MCNC: 8.8 MG/DL (ref 8.8–10.2)
CHLORIDE SERPL-SCNC: 104 MMOL/L (ref 98–107)
CREAT SERPL-MCNC: 0.76 MG/DL (ref 0.67–1.17)
DEPRECATED HCO3 PLAS-SCNC: 27 MMOL/L (ref 22–29)
EGFRCR SERPLBLD CKD-EPI 2021: >90 ML/MIN/1.73M2
EOSINOPHIL # BLD AUTO: 0.1 10E3/UL (ref 0–0.7)
EOSINOPHIL NFR BLD AUTO: 2 %
ERYTHROCYTE [DISTWIDTH] IN BLOOD BY AUTOMATED COUNT: 13.5 % (ref 10–15)
GLUCOSE SERPL-MCNC: 129 MG/DL (ref 70–99)
HCT VFR BLD AUTO: 37.6 % (ref 40–53)
HGB BLD-MCNC: 12.9 G/DL (ref 13.3–17.7)
IMM GRANULOCYTES # BLD: 0 10E3/UL
IMM GRANULOCYTES NFR BLD: 1 %
LYMPHOCYTES # BLD AUTO: 0.6 10E3/UL (ref 0.8–5.3)
LYMPHOCYTES NFR BLD AUTO: 10 %
MAGNESIUM SERPL-MCNC: 1.9 MG/DL (ref 1.7–2.3)
MCH RBC QN AUTO: 30.7 PG (ref 26.5–33)
MCHC RBC AUTO-ENTMCNC: 34.3 G/DL (ref 31.5–36.5)
MCV RBC AUTO: 90 FL (ref 78–100)
MONOCYTES # BLD AUTO: 0.4 10E3/UL (ref 0–1.3)
MONOCYTES NFR BLD AUTO: 7 %
NEUTROPHILS # BLD AUTO: 4.9 10E3/UL (ref 1.6–8.3)
NEUTROPHILS NFR BLD AUTO: 80 %
NRBC # BLD AUTO: 0 10E3/UL
NRBC BLD AUTO-RTO: 0 /100
PLATELET # BLD AUTO: 205 10E3/UL (ref 150–450)
POTASSIUM SERPL-SCNC: 3.4 MMOL/L (ref 3.4–5.3)
PROT SERPL-MCNC: 6.8 G/DL (ref 6.4–8.3)
RBC # BLD AUTO: 4.2 10E6/UL (ref 4.4–5.9)
SODIUM SERPL-SCNC: 141 MMOL/L (ref 135–145)
WBC # BLD AUTO: 6.1 10E3/UL (ref 4–11)

## 2024-04-24 PROCEDURE — 83735 ASSAY OF MAGNESIUM: CPT | Performed by: INTERNAL MEDICINE

## 2024-04-24 PROCEDURE — 258N000003 HC RX IP 258 OP 636: Performed by: INTERNAL MEDICINE

## 2024-04-24 PROCEDURE — 99214 OFFICE O/P EST MOD 30 MIN: CPT | Performed by: INTERNAL MEDICINE

## 2024-04-24 PROCEDURE — 96367 TX/PROPH/DG ADDL SEQ IV INF: CPT

## 2024-04-24 PROCEDURE — 80053 COMPREHEN METABOLIC PANEL: CPT | Performed by: INTERNAL MEDICINE

## 2024-04-24 PROCEDURE — 77386 HC IMRT TREATMENT DELIVERY, COMPLEX: CPT | Performed by: RADIOLOGY

## 2024-04-24 PROCEDURE — 250N000011 HC RX IP 250 OP 636: Performed by: INTERNAL MEDICINE

## 2024-04-24 PROCEDURE — 96413 CHEMO IV INFUSION 1 HR: CPT

## 2024-04-24 PROCEDURE — 77014 PR CT GUIDE FOR PLACEMENT RADIATION THERAPY FIELDS: CPT | Mod: 26 | Performed by: RADIOLOGY

## 2024-04-24 PROCEDURE — 36415 COLL VENOUS BLD VENIPUNCTURE: CPT | Performed by: INTERNAL MEDICINE

## 2024-04-24 PROCEDURE — 85025 COMPLETE CBC W/AUTO DIFF WBC: CPT | Performed by: INTERNAL MEDICINE

## 2024-04-24 PROCEDURE — G0463 HOSPITAL OUTPT CLINIC VISIT: HCPCS | Performed by: INTERNAL MEDICINE

## 2024-04-24 PROCEDURE — 96375 TX/PRO/DX INJ NEW DRUG ADDON: CPT

## 2024-04-24 RX ORDER — ALBUTEROL SULFATE 0.83 MG/ML
2.5 SOLUTION RESPIRATORY (INHALATION)
Status: DISCONTINUED | OUTPATIENT
Start: 2024-04-24 | End: 2024-04-24 | Stop reason: HOSPADM

## 2024-04-24 RX ORDER — MEPERIDINE HYDROCHLORIDE 50 MG/ML
25 INJECTION INTRAMUSCULAR; INTRAVENOUS; SUBCUTANEOUS EVERY 30 MIN PRN
Status: DISCONTINUED | OUTPATIENT
Start: 2024-04-24 | End: 2024-04-24 | Stop reason: HOSPADM

## 2024-04-24 RX ORDER — DIPHENHYDRAMINE HYDROCHLORIDE 50 MG/ML
50 INJECTION INTRAMUSCULAR; INTRAVENOUS
Status: DISCONTINUED | OUTPATIENT
Start: 2024-04-24 | End: 2024-04-24 | Stop reason: HOSPADM

## 2024-04-24 RX ORDER — PALONOSETRON 0.05 MG/ML
0.25 INJECTION, SOLUTION INTRAVENOUS ONCE
Qty: 5 ML | Refills: 0 | Status: COMPLETED | OUTPATIENT
Start: 2024-04-24 | End: 2024-04-24

## 2024-04-24 RX ORDER — ALBUTEROL SULFATE 90 UG/1
1-2 AEROSOL, METERED RESPIRATORY (INHALATION)
Status: DISCONTINUED | OUTPATIENT
Start: 2024-04-24 | End: 2024-04-24 | Stop reason: HOSPADM

## 2024-04-24 RX ORDER — METHYLPREDNISOLONE SODIUM SUCCINATE 125 MG/2ML
125 INJECTION, POWDER, LYOPHILIZED, FOR SOLUTION INTRAMUSCULAR; INTRAVENOUS
Status: DISCONTINUED | OUTPATIENT
Start: 2024-04-24 | End: 2024-04-24 | Stop reason: HOSPADM

## 2024-04-24 RX ORDER — EPINEPHRINE 1 MG/ML
0.3 INJECTION, SOLUTION INTRAMUSCULAR; SUBCUTANEOUS EVERY 5 MIN PRN
Status: DISCONTINUED | OUTPATIENT
Start: 2024-04-24 | End: 2024-04-24 | Stop reason: HOSPADM

## 2024-04-24 RX ADMIN — SODIUM CHLORIDE 1000 ML: 9 INJECTION, SOLUTION INTRAVENOUS at 09:21

## 2024-04-24 RX ADMIN — PALONOSETRON 0.25 MG: 0.05 INJECTION, SOLUTION INTRAVENOUS at 09:24

## 2024-04-24 RX ADMIN — FOSAPREPITANT: 150 INJECTION, POWDER, LYOPHILIZED, FOR SOLUTION INTRAVENOUS at 09:36

## 2024-04-24 RX ADMIN — CISPLATIN 81 MG: 1 INJECTION, SOLUTION INTRAVENOUS at 10:47

## 2024-04-24 ASSESSMENT — ENCOUNTER SYMPTOMS
UNEXPECTED WEIGHT CHANGE: 1
CARDIOVASCULAR NEGATIVE: 1
RESPIRATORY NEGATIVE: 1
HEMATOLOGIC/LYMPHATIC NEGATIVE: 1
ENDOCRINE NEGATIVE: 1
NEUROLOGICAL NEGATIVE: 1
MUSCULOSKELETAL NEGATIVE: 1
SORE THROAT: 1
PSYCHIATRIC NEGATIVE: 1
TROUBLE SWALLOWING: 0
EYES NEGATIVE: 1
GASTROINTESTINAL NEGATIVE: 1

## 2024-04-24 ASSESSMENT — PAIN SCALES - GENERAL
PAINLEVEL: MILD PAIN (2)
PAINLEVEL: SEVERE PAIN (6)

## 2024-04-24 NOTE — PROGRESS NOTES
RADIATION ONCOLOGY WEEKLY TREATMENT VISIT NOTE      Assessment / Impression       Visit Dx:  (C01) Malignant neoplasm of base of tongue (H)  (primary encounter diagnosis)    Tolerating radiation therapy well.  All questions and concerns addressed.    Plan:     Continue radiation treatment as prescribed.    Patient noticed mild dysphagia at this time and is able to get adequate daily calorie and fluid intake.  We will continue monitor patient closely.    Subjective:      HPI: Donovan Yi is a 73 year old male with  Malignant neoplasm of base of tongue (H) [C01]    The following portions of the patient's history were reviewed and updated as appropriate: allergies, current medications, past family history, past medical history, past social history, past surgical history and problem list.    Assessment                  Body Site:  Head and Neck Site: BOT  Stereotactic Radiosurgery: No  Concurrent Therapy: Yes (weekly cisplatin)  Today's Dose: 2800  Total Dose for Head and Neck: 7000  Today's Fraction/Total Fraction Head and Neck: 14  Mucositis due to radiation: 0: None  Thrush: 0: Absent  Pharynx and Esphogaus: 2: Tolerates soft diet  Voice Chances/Stridor/Larynx: 0: Normal  Ocular/Visual - other : 0: Normal  Middle ear/hearin: Normal  Tinnitus: 0: No  Cough: 0: Absent                                   Sexuality Alteration                    Emotional Alteration    Copin: Ineffective  Comfort Alteration   KPS: 90% Can perform normal activity, minor signs of disease  Fatigue (ONS scale): 0: No Fatigue  Pain Location: mouth  Pain Intensity. Rate degree of pain ranging from 0 (no pain) to 10 (severe pain): 3  Pain Description: Ache - Muscular type ache  Pain Intervention: 3: Opiods (hydrocodone, gabapentin, belbuca, magic mouthwash)  Effectiveness of pain intervention: 2: Pain relieved 50%   Nutrition Alteration   Anorexia: 0: None  Nausea: 0: None  Vomitin: None  Weight: 80.3 kg (177  lb)  Pharynx and Esphogaus: 2: Tolerates soft diet  Skin Alteration   Skin Sensation: 0: No problem  Skin Reaction: 0: None  AUA Assessment                                           Accompanied by       Objective:     Exam: mild Erythema.    Vitals:    04/24/24 1228   BP: (!) 172/81   Pulse: 77   Resp: 16   Temp: 97.6  F (36.4  C)   TempSrc: Oral   SpO2: 100%   Weight: 80.3 kg (177 lb)   PainSc: Mild Pain (2)   PainLoc: Other - see comment       Wt Readings from Last 8 Encounters:   04/24/24 80.3 kg (177 lb)   04/24/24 80.7 kg (177 lb 14.4 oz)   04/19/24 81.5 kg (179 lb 11.2 oz)   04/17/24 82.2 kg (181 lb 4.8 oz)   04/17/24 81.6 kg (180 lb)   04/10/24 82.3 kg (181 lb 6.4 oz)   04/10/24 82.6 kg (182 lb)   04/03/24 82.4 kg (181 lb 11.2 oz)       General: Alert and oriented, in no acute distress  Christopher has mild Erythema.  Aria chart and setup information reviewed    Joyce Yang MD

## 2024-04-24 NOTE — LETTER
4/24/2024         RE: Donovan Yi  [id number] [address]        Dear Colleague,    Thank you for referring your patient, Donovan Yi, to the [facility]. Please see a copy of my visit note below.    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     He began definitive chemoradiation (weekly cisplatin) on 1 April 2024 and is determined to carry on, although he's had a fair bit of pain already and has lost 5#.  His pain meds were adjusted and he's doing a bit better than last week.    ECOG = 1    Oncologic History  Mr. Yi is a 73 year old male who is a former smoker 1 pack a day for 16 years and quit smoking since 1984.  Patient presented with recent history of some pain and discomfort related to this and thinks that has been present for about a month. He denies any otalgia or tongue numbness.  He was found to have a lesion on the left side of the tongue.  He does not have a dyne aphasia or dysphagia. He has not had any hoarseness and has not noticed any lumps or bumps in his neck.  ENT examination revealed an obvious mass pushing forward into the junctional tongue and tongue base. It has a large crater in the center with some raised tissue laterally and looks endophytic into the left tonsillar fossa.  There is no palpable lymphadenopathy.  Biopsy was obtained on 2/28/2024 with pathology showed p16 positive squamous cell carcinoma.  Patient had a staging PET CT scan on 3/6/2024 which showed FDG avid 2.7 x 2.6 x 4.4 cm mass in the left base of tongue extending inferiorly into the vallecula compatible with biopsy-proven squamous  cell carcinoma.  There is no evidence of lymph nodes and systemic metastasis.  The case has been reviewed on ENT tumor conference of [facility].  The tumor was staged T3 N0 M0 and the consensus recommendation is to consider concurrent chemoradiation therapy.  Patient Active Problem List   Diagnosis     Opioid type dependence, continuous (H)     Lumbar Disc Degeneration     PVD (posterior vitreous detachment), unspecified laterality     Cortical age-related cataract of both eyes     Sacroiliac pain     Essential hypertension     Claudication (H24)     Tongue mass     Primary squamous cell carcinoma of base of tongue (H)     Current Outpatient Medications   Medication Sig Dispense Refill     aspirin (ASA) 325 MG tablet Take 1 tablet by mouth       Buprenorphine HCl (BELBUCA) 150 MCG FILM buccal film Place 1 Film (150 mcg) inside cheek every 12 hours for 1 day, THEN 2 Film (300 mcg) every 12 hours for 29 days. 118 Film 0     cyclobenzaprine (FLEXERIL) 10 MG tablet Take 10 mg by mouth 3 times daily as needed       dexAMETHasone (DECADRON) 4 MG tablet Take 2 tablets (8 mg) by mouth daily Take for 3 days, starting the day after chemo. Take with food. If no nausea and vomiting with first cisplatin doses, may stop dexamethasone. 6 tablet 5     diazepam (VALIUM) 5 MG tablet Take 1-2 tablets (5-10 mg) by mouth every 12 hours as needed for anxiety (prior to radiation treatments) 20 tablet 1     doxepin (SINEQUAN) 10 MG/ML (HIGH CONC) solution Take 0.3-1 mLs (3-10 mg) by mouth at bedtime Start with 3 mg (0.3 mL) and increase by 1 mg (0.1 mL) nightly until effective. 30 mL 3     gabapentin (NEURONTIN) 300 MG capsule Take one capsule at bedtime for 3 days. Can increase to 2 capsules at bedtime or 1 capsule twice daily after 3 days. 60 capsule 1     HYDROcodone-acetaminophen (NORCO)  MG per tablet        lidocaine, viscous, (XYLOCAINE) 2 % solution        lisinopril (ZESTRIL) 10 MG tablet Take 2 tablets (20 mg) by  mouth daily 90 tablet 1     LORazepam (ATIVAN) 0.5 MG tablet Take 1 tablet (0.5 mg) by mouth nightly as needed for anxiety or sleep 30 tablet 1     LORazepam (ATIVAN) 0.5 MG tablet Take 1 tablet (0.5 mg) by mouth every 6 hours as needed for anxiety (Take 0.5 to 1.0 mg 1 hour before planned procedure.) 20 tablet 0     magic mouthwash suspension (diphenhydrAMINE, lidocaine, aluminum-magnesium & simethicone) SWISH AND SWALLOW 10 ML IN MOUTH EVERY 4 HOURS AS NEEDED FOR MOUTH SORES       magic mouthwash suspension (diphenhydrAMINE, lidocaine, aluminum-magnesium & simethicone) Swish and swallow 10 mLs in mouth every 4 hours as needed for mouth sores 500 mL 2     Multiple Vitamins-Minerals (ONE-A-DAY 50 PLUS PO) Take 1 tablet by mouth daily       ondansetron (ZOFRAN) 8 MG tablet Take 1 tablet (8 mg) by mouth every 8 hours as needed for nausea (vomiting) 30 tablet 2     Probiotic Product (PROBIOTIC BLEND PO) Take 2 capsules by mouth daily Prebiotic and Probiotic blend       rosuvastatin (CRESTOR) 20 MG tablet Take 1 tablet by mouth at bedtime       sodium fluoride dental gel (PREVIDENT) 1.1 % GEL topical gel USE AS DIRECTED OVERNIGHT. DISPENSE A SMALL AMOUNT INTO UPPER AND LOWER TRAYS. DO NOT EAT OR DRINK AFTER       tamsulosin (FLOMAX) 0.4 MG capsule TAKE 1 CAPSULE(0.4 MG) BY MOUTH DAILY 90 capsule 1     No current facility-administered medications for this visit.     Facility-Administered Medications Ordered in Other Visits   Medication Dose Route Frequency Provider Last Rate Last Admin     barium sulfate (EZ-DISK) tablet 700 mg  700 mg Oral Once Rodrigue Garcia MD         barium sulfate (VARIBAR THIN Liquid) 40 % oral suspension 24 g  60 mL Oral Once Rodrigue Garcia MD         barium sulfate (VARIBAR) 40 % pudding/paste 30 mL  30 mL Oral Once Rodrigue Garcia MD         barium sulfate 40% (VARIBAR NECTAR) oral suspension   Oral Once Rodrigue Garcia MD         Past Medical History:  "  Diagnosis Date     Food intolerance in adult      Hypertension      Malignant neoplasm of base of tongue (H)      Past Surgical History:   Procedure Laterality Date     HC REMOVAL GALLBLADDER      Description: Cholecystectomy;  Recorded: 12/06/2011;     Socioeconomic History     Marital status:      Social Determinants of Health     Interpersonal Safety: Low Risk  (2/14/2024)    Interpersonal Safety      Do you feel physically and emotionally safe where you currently live?: Yes      Within the past 12 months, have you been hit, slapped, kicked or otherwise physically hurt by someone?: No      Within the past 12 months, have you been humiliated or emotionally abused in other ways by your partner or ex-partner?: No     Review of Systems   Constitutional:  Positive for unexpected weight change.   HENT:   Positive for sore throat. Negative for trouble swallowing.    Eyes: Negative.    Respiratory: Negative.     Cardiovascular: Negative.    Gastrointestinal: Negative.    Endocrine: Negative.    Genitourinary: Negative.     Musculoskeletal: Negative.    Skin: Negative.    Neurological: Negative.    Hematological: Negative.    Psychiatric/Behavioral: Negative.     All other systems reviewed and are negative.      BP (!) 144/73 (Patient Position: Sitting)   Pulse 75   Temp 97.9  F (36.6  C) (Oral)   Resp 16   Ht 1.765 m (5' 9.5\")   Wt 80.7 kg (177 lb 14.4 oz)   SpO2 98%   BMI 25.89 kg/m      Physical Exam  Vitals and nursing note reviewed.   Constitutional:       General: He is awake.      Appearance: He is normal weight. He is ill-appearing.      Comments: Casually dressed, looks very tired and concerned about being late for treatments   HENT:      Head: Normocephalic and atraumatic.      Mouth/Throat:      Mouth: Mucous membranes are dry.      Dentition: Does not have dentures.      Pharynx: Posterior oropharyngeal erythema present.      Comments: Not able to open mouth widely  Eyes:      Extraocular " Movements: Extraocular movements intact.      Conjunctiva/sclera: Conjunctivae normal.      Pupils: Pupils are equal, round, and reactive to light.   Cardiovascular:      Rate and Rhythm: Normal rate and regular rhythm.      Heart sounds: Normal heart sounds.   Pulmonary:      Effort: Pulmonary effort is normal.      Breath sounds: Normal breath sounds.   Abdominal:      General: There is no distension.      Palpations: Abdomen is soft. There is no mass.      Tenderness: There is no abdominal tenderness. There is no guarding.   Musculoskeletal:         General: No swelling or deformity.      Cervical back: Normal range of motion and neck supple.   Lymphadenopathy:      Cervical: No cervical adenopathy.   Skin:     General: Skin is warm and dry.   Neurological:      General: No focal deficit present.      Mental Status: He is oriented to person, place, and time.      Cranial Nerves: No cranial nerve deficit.      Motor: No weakness.      Gait: Gait abnormal (broad based and using a cane).      Deep Tendon Reflexes: Reflexes normal.   Psychiatric:         Behavior: Behavior is cooperative.       Recent Results (from the past 720 hour(s))   Comprehensive metabolic panel    Collection Time: 04/03/24 11:25 AM   Result Value Ref Range    Sodium 142 135 - 145 mmol/L    Potassium 3.6 3.4 - 5.3 mmol/L    Carbon Dioxide (CO2) 30 (H) 22 - 29 mmol/L    Anion Gap 10 7 - 15 mmol/L    Urea Nitrogen 17.0 8.0 - 23.0 mg/dL    Creatinine 0.89 0.67 - 1.17 mg/dL    GFR Estimate 89 >60 mL/min/1.73m2    Calcium 9.4 8.8 - 10.2 mg/dL    Chloride 102 98 - 107 mmol/L    Glucose 101 (H) 70 - 99 mg/dL    Alkaline Phosphatase 73 40 - 150 U/L    AST 21 0 - 45 U/L    ALT 13 0 - 70 U/L    Protein Total 7.4 6.4 - 8.3 g/dL    Albumin 4.4 3.5 - 5.2 g/dL    Bilirubin Total 0.6 <=1.2 mg/dL   Magnesium    Collection Time: 04/03/24 11:25 AM   Result Value Ref Range    Magnesium 2.0 1.7 - 2.3 mg/dL   CBC with platelets and differential    Collection Time:  04/03/24 11:25 AM   Result Value Ref Range    WBC Count 6.5 4.0 - 11.0 10e3/uL    RBC Count 4.59 4.40 - 5.90 10e6/uL    Hemoglobin 13.8 13.3 - 17.7 g/dL    Hematocrit 41.1 40.0 - 53.0 %    MCV 90 78 - 100 fL    MCH 30.1 26.5 - 33.0 pg    MCHC 33.6 31.5 - 36.5 g/dL    RDW 13.0 10.0 - 15.0 %    Platelet Count 248 150 - 450 10e3/uL    % Neutrophils 65 %    % Lymphocytes 18 %    % Monocytes 11 %    % Eosinophils 4 %    % Basophils 1 %    % Immature Granulocytes 0 %    NRBCs per 100 WBC 0 <1 /100    Absolute Neutrophils 4.2 1.6 - 8.3 10e3/uL    Absolute Lymphocytes 1.2 0.8 - 5.3 10e3/uL    Absolute Monocytes 0.7 0.0 - 1.3 10e3/uL    Absolute Eosinophils 0.3 0.0 - 0.7 10e3/uL    Absolute Basophils 0.1 0.0 - 0.2 10e3/uL    Absolute Immature Granulocytes 0.0 <=0.4 10e3/uL    Absolute NRBCs 0.0 10e3/uL   Comprehensive metabolic panel    Collection Time: 04/10/24 11:00 AM   Result Value Ref Range    Sodium 141 135 - 145 mmol/L    Potassium 4.0 3.4 - 5.3 mmol/L    Carbon Dioxide (CO2) 27 22 - 29 mmol/L    Anion Gap 10 7 - 15 mmol/L    Urea Nitrogen 23.2 (H) 8.0 - 23.0 mg/dL    Creatinine 0.75 0.67 - 1.17 mg/dL    GFR Estimate >90 >60 mL/min/1.73m2    Calcium 9.0 8.8 - 10.2 mg/dL    Chloride 104 98 - 107 mmol/L    Glucose 166 (H) 70 - 99 mg/dL    Alkaline Phosphatase 69 40 - 150 U/L    AST 26 0 - 45 U/L    ALT 29 0 - 70 U/L    Protein Total 6.9 6.4 - 8.3 g/dL    Albumin 4.1 3.5 - 5.2 g/dL    Bilirubin Total 0.5 <=1.2 mg/dL   Magnesium    Collection Time: 04/10/24 11:00 AM   Result Value Ref Range    Magnesium 1.9 1.7 - 2.3 mg/dL   CBC with platelets and differential    Collection Time: 04/10/24 11:00 AM   Result Value Ref Range    WBC Count 6.1 4.0 - 11.0 10e3/uL    RBC Count 4.48 4.40 - 5.90 10e6/uL    Hemoglobin 13.3 13.3 - 17.7 g/dL    Hematocrit 39.6 (L) 40.0 - 53.0 %    MCV 88 78 - 100 fL    MCH 29.7 26.5 - 33.0 pg    MCHC 33.6 31.5 - 36.5 g/dL    RDW 12.8 10.0 - 15.0 %    Platelet Count 231 150 - 450 10e3/uL    %  Neutrophils 71 %    % Lymphocytes 15 %    % Monocytes 8 %    % Eosinophils 5 %    % Basophils 0 %    % Immature Granulocytes 1 %    NRBCs per 100 WBC 0 <1 /100    Absolute Neutrophils 4.3 1.6 - 8.3 10e3/uL    Absolute Lymphocytes 0.9 0.8 - 5.3 10e3/uL    Absolute Monocytes 0.5 0.0 - 1.3 10e3/uL    Absolute Eosinophils 0.3 0.0 - 0.7 10e3/uL    Absolute Basophils 0.0 0.0 - 0.2 10e3/uL    Absolute Immature Granulocytes 0.0 <=0.4 10e3/uL    Absolute NRBCs 0.0 10e3/uL   Comprehensive metabolic panel    Collection Time: 04/17/24  9:26 AM   Result Value Ref Range    Sodium 140 135 - 145 mmol/L    Potassium 3.6 3.4 - 5.3 mmol/L    Carbon Dioxide (CO2) 28 22 - 29 mmol/L    Anion Gap 10 7 - 15 mmol/L    Urea Nitrogen 18.4 8.0 - 23.0 mg/dL    Creatinine 0.74 0.67 - 1.17 mg/dL    GFR Estimate >90 >60 mL/min/1.73m2    Calcium 8.8 8.8 - 10.2 mg/dL    Chloride 102 98 - 107 mmol/L    Glucose 142 (H) 70 - 99 mg/dL    Alkaline Phosphatase 67 40 - 150 U/L    AST 21 0 - 45 U/L    ALT 22 0 - 70 U/L    Protein Total 6.6 6.4 - 8.3 g/dL    Albumin 4.0 3.5 - 5.2 g/dL    Bilirubin Total 0.5 <=1.2 mg/dL   Magnesium    Collection Time: 04/17/24  9:26 AM   Result Value Ref Range    Magnesium 1.8 1.7 - 2.3 mg/dL   CBC with platelets and differential    Collection Time: 04/17/24  9:26 AM   Result Value Ref Range    WBC Count 6.4 4.0 - 11.0 10e3/uL    RBC Count 4.42 4.40 - 5.90 10e6/uL    Hemoglobin 13.2 (L) 13.3 - 17.7 g/dL    Hematocrit 39.5 (L) 40.0 - 53.0 %    MCV 89 78 - 100 fL    MCH 29.9 26.5 - 33.0 pg    MCHC 33.4 31.5 - 36.5 g/dL    RDW 13.0 10.0 - 15.0 %    Platelet Count 198 150 - 450 10e3/uL    % Neutrophils 80 %    % Lymphocytes 9 %    % Monocytes 8 %    % Eosinophils 2 %    % Basophils 1 %    % Immature Granulocytes 0 %    NRBCs per 100 WBC 0 <1 /100    Absolute Neutrophils 5.1 1.6 - 8.3 10e3/uL    Absolute Lymphocytes 0.6 (L) 0.8 - 5.3 10e3/uL    Absolute Monocytes 0.5 0.0 - 1.3 10e3/uL    Absolute Eosinophils 0.1 0.0 - 0.7 10e3/uL     Absolute Basophils 0.0 0.0 - 0.2 10e3/uL    Absolute Immature Granulocytes 0.0 <=0.4 10e3/uL    Absolute NRBCs 0.0 10e3/uL   Comprehensive metabolic panel    Collection Time: 04/24/24  8:37 AM   Result Value Ref Range    Sodium 141 135 - 145 mmol/L    Potassium 3.4 3.4 - 5.3 mmol/L    Carbon Dioxide (CO2) 27 22 - 29 mmol/L    Anion Gap 10 7 - 15 mmol/L    Urea Nitrogen 20.6 8.0 - 23.0 mg/dL    Creatinine 0.76 0.67 - 1.17 mg/dL    GFR Estimate >90 >60 mL/min/1.73m2    Calcium 8.8 8.8 - 10.2 mg/dL    Chloride 104 98 - 107 mmol/L    Glucose 129 (H) 70 - 99 mg/dL    Alkaline Phosphatase 67 40 - 150 U/L    AST 18 0 - 45 U/L    ALT 15 0 - 70 U/L    Protein Total 6.8 6.4 - 8.3 g/dL    Albumin 3.9 3.5 - 5.2 g/dL    Bilirubin Total 0.4 <=1.2 mg/dL   Magnesium    Collection Time: 04/24/24  8:37 AM   Result Value Ref Range    Magnesium 1.9 1.7 - 2.3 mg/dL   CBC with platelets and differential    Collection Time: 04/24/24  8:37 AM   Result Value Ref Range    WBC Count 6.1 4.0 - 11.0 10e3/uL    RBC Count 4.20 (L) 4.40 - 5.90 10e6/uL    Hemoglobin 12.9 (L) 13.3 - 17.7 g/dL    Hematocrit 37.6 (L) 40.0 - 53.0 %    MCV 90 78 - 100 fL    MCH 30.7 26.5 - 33.0 pg    MCHC 34.3 31.5 - 36.5 g/dL    RDW 13.5 10.0 - 15.0 %    Platelet Count 205 150 - 450 10e3/uL    % Neutrophils 80 %    % Lymphocytes 10 %    % Monocytes 7 %    % Eosinophils 2 %    % Basophils 1 %    % Immature Granulocytes 1 %    NRBCs per 100 WBC 0 <1 /100    Absolute Neutrophils 4.9 1.6 - 8.3 10e3/uL    Absolute Lymphocytes 0.6 (L) 0.8 - 5.3 10e3/uL    Absolute Monocytes 0.4 0.0 - 1.3 10e3/uL    Absolute Eosinophils 0.1 0.0 - 0.7 10e3/uL    Absolute Basophils 0.0 0.0 - 0.2 10e3/uL    Absolute Immature Granulocytes 0.0 <=0.4 10e3/uL    Absolute NRBCs 0.0 10e3/uL           No results found for this or any previous visit (from the past 744 hour(s)).        Oncology Rooming Note    April 24, 2024 8:52 AM   Donovan Yi is a 73 year old male who presents  "for:    Chief Complaint   Patient presents with     Oncology Clinic Visit     Primary squamous cell carcinoma of base of tongue     Initial Vitals: BP (!) 144/73 (Patient Position: Sitting)   Pulse 75   Temp 97.9  F (36.6  C) (Oral)   Resp 16   Ht 1.765 m (5' 9.5\")   Wt 80.7 kg (177 lb 14.4 oz)   SpO2 98%   BMI 25.89 kg/m   Estimated body mass index is 25.89 kg/m  as calculated from the following:    Height as of this encounter: 1.765 m (5' 9.5\").    Weight as of this encounter: 80.7 kg (177 lb 14.4 oz). Body surface area is 1.99 meters squared.  Severe Pain (6) Comment: Data Unavailable   No LMP for male patient.  Allergies reviewed: Yes  Medications reviewed: Yes    Medications: Medication refills not needed today.  Pharmacy name entered into [facility]: [id number] [id number] AT [facility] [place] [address]    Frailty Screening:   Is the patient here for a new oncology consult visit in cancer care? 2. No      Clinical concerns: Mouth pain rated at 6. Fatigue.       Mariela Villa LPN                Again, thank you for allowing me to participate in the care of your patient.        Sincerely,        Jaqueline Huertas MD  "

## 2024-04-24 NOTE — PROGRESS NOTES
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     He began definitive chemoradiation (weekly cisplatin) on 1 April 2024 and is determined to carry on, although he's had a fair bit of pain already and has lost 5#.  His pain meds were adjusted and he's doing a bit better than last week.    ECOG = 1    Oncologic History  Mr. Yi is a 73 year old male who is a former smoker 1 pack a day for 16 years and quit smoking since 1984.  Patient presented with recent history of some pain and discomfort related to this and thinks that has been present for about a month. He denies any otalgia or tongue numbness.  He was found to have a lesion on the left side of the tongue.  He does not have a dyne aphasia or dysphagia. He has not had any hoarseness and has not noticed any lumps or bumps in his neck.  ENT examination revealed an obvious mass pushing forward into the junctional tongue and tongue base. It has a large crater in the center with some raised tissue laterally and looks endophytic into the left tonsillar fossa.  There is no palpable lymphadenopathy.  Biopsy was obtained on 2/28/2024 with pathology showed p16 positive squamous cell carcinoma.  Patient had a staging PET CT scan on 3/6/2024 which showed FDG avid 2.7 x 2.6 x 4.4 cm mass in the left base of tongue extending inferiorly into the vallecula compatible with biopsy-proven squamous cell carcinoma.  There is no evidence of lymph nodes and systemic metastasis.  The case has been reviewed on ENT tumor conference of [facility].  The tumor was staged T3 N0 M0 and the consensus recommendation is to consider concurrent chemoradiation  therapy.  Patient Active Problem List   Diagnosis    Opioid type dependence, continuous (H)    Lumbar Disc Degeneration    PVD (posterior vitreous detachment), unspecified laterality    Cortical age-related cataract of both eyes    Sacroiliac pain    Essential hypertension    Claudication (H24)    Tongue mass    Primary squamous cell carcinoma of base of tongue (H)     Current Outpatient Medications   Medication Sig Dispense Refill    aspirin (ASA) 325 MG tablet Take 1 tablet by mouth      Buprenorphine HCl (BELBUCA) 150 MCG FILM buccal film Place 1 Film (150 mcg) inside cheek every 12 hours for 1 day, THEN 2 Film (300 mcg) every 12 hours for 29 days. 118 Film 0    cyclobenzaprine (FLEXERIL) 10 MG tablet Take 10 mg by mouth 3 times daily as needed      dexAMETHasone (DECADRON) 4 MG tablet Take 2 tablets (8 mg) by mouth daily Take for 3 days, starting the day after chemo. Take with food. If no nausea and vomiting with first cisplatin doses, may stop dexamethasone. 6 tablet 5    diazepam (VALIUM) 5 MG tablet Take 1-2 tablets (5-10 mg) by mouth every 12 hours as needed for anxiety (prior to radiation treatments) 20 tablet 1    doxepin (SINEQUAN) 10 MG/ML (HIGH CONC) solution Take 0.3-1 mLs (3-10 mg) by mouth at bedtime Start with 3 mg (0.3 mL) and increase by 1 mg (0.1 mL) nightly until effective. 30 mL 3    gabapentin (NEURONTIN) 300 MG capsule Take one capsule at bedtime for 3 days. Can increase to 2 capsules at bedtime or 1 capsule twice daily after 3 days. 60 capsule 1    HYDROcodone-acetaminophen (NORCO)  MG per tablet       lidocaine, viscous, (XYLOCAINE) 2 % solution       lisinopril (ZESTRIL) 10 MG tablet Take 2 tablets (20 mg) by mouth daily 90 tablet 1    LORazepam (ATIVAN) 0.5 MG tablet Take 1 tablet (0.5 mg) by mouth nightly as needed for anxiety or sleep 30 tablet 1    LORazepam (ATIVAN) 0.5 MG tablet Take 1 tablet (0.5 mg) by mouth every 6 hours as needed for anxiety (Take 0.5 to 1.0 mg 1 hour  before planned procedure.) 20 tablet 0    magic mouthwash suspension (diphenhydrAMINE, lidocaine, aluminum-magnesium & simethicone) SWISH AND SWALLOW 10 ML IN MOUTH EVERY 4 HOURS AS NEEDED FOR MOUTH SORES      magic mouthwash suspension (diphenhydrAMINE, lidocaine, aluminum-magnesium & simethicone) Swish and swallow 10 mLs in mouth every 4 hours as needed for mouth sores 500 mL 2    Multiple Vitamins-Minerals (ONE-A-DAY 50 PLUS PO) Take 1 tablet by mouth daily      ondansetron (ZOFRAN) 8 MG tablet Take 1 tablet (8 mg) by mouth every 8 hours as needed for nausea (vomiting) 30 tablet 2    Probiotic Product (PROBIOTIC BLEND PO) Take 2 capsules by mouth daily Prebiotic and Probiotic blend      rosuvastatin (CRESTOR) 20 MG tablet Take 1 tablet by mouth at bedtime      sodium fluoride dental gel (PREVIDENT) 1.1 % GEL topical gel USE AS DIRECTED OVERNIGHT. DISPENSE A SMALL AMOUNT INTO UPPER AND LOWER TRAYS. DO NOT EAT OR DRINK AFTER      tamsulosin (FLOMAX) 0.4 MG capsule TAKE 1 CAPSULE(0.4 MG) BY MOUTH DAILY 90 capsule 1     No current facility-administered medications for this visit.     Facility-Administered Medications Ordered in Other Visits   Medication Dose Route Frequency Provider Last Rate Last Admin    barium sulfate (EZ-DISK) tablet 700 mg  700 mg Oral Once Rodrigue Garcia MD        barium sulfate (VARIBAR THIN Liquid) 40 % oral suspension 24 g  60 mL Oral Once Rodrigue Garcia MD        barium sulfate (VARIBAR) 40 % pudding/paste 30 mL  30 mL Oral Once Rodrigue Garcia MD        barium sulfate 40% (VARIBAR NECTAR) oral suspension   Oral Once Rodrigue Garcia MD         Past Medical History:   Diagnosis Date    Food intolerance in adult     Hypertension     Malignant neoplasm of base of tongue (H)      Past Surgical History:   Procedure Laterality Date    HC REMOVAL GALLBLADDER      Description: Cholecystectomy;  Recorded: 12/06/2011;     Socioeconomic History    Marital status:  "     Social Determinants of Health     Interpersonal Safety: Low Risk  (2/14/2024)    Interpersonal Safety     Do you feel physically and emotionally safe where you currently live?: Yes     Within the past 12 months, have you been hit, slapped, kicked or otherwise physically hurt by someone?: No     Within the past 12 months, have you been humiliated or emotionally abused in other ways by your partner or ex-partner?: No     Review of Systems   Constitutional:  Positive for unexpected weight change.   HENT:   Positive for sore throat. Negative for trouble swallowing.    Eyes: Negative.    Respiratory: Negative.     Cardiovascular: Negative.    Gastrointestinal: Negative.    Endocrine: Negative.    Genitourinary: Negative.     Musculoskeletal: Negative.    Skin: Negative.    Neurological: Negative.    Hematological: Negative.    Psychiatric/Behavioral: Negative.     All other systems reviewed and are negative.      BP (!) 144/73 (Patient Position: Sitting)   Pulse 75   Temp 97.9  F (36.6  C) (Oral)   Resp 16   Ht 1.765 m (5' 9.5\")   Wt 80.7 kg (177 lb 14.4 oz)   SpO2 98%   BMI 25.89 kg/m      Physical Exam  Vitals and nursing note reviewed.   Constitutional:       General: He is awake.      Appearance: He is normal weight. He is ill-appearing.      Comments: Casually dressed, looks very tired and concerned about being late for treatments   HENT:      Head: Normocephalic and atraumatic.      Mouth/Throat:      Mouth: Mucous membranes are dry.      Dentition: Does not have dentures.      Pharynx: Posterior oropharyngeal erythema present.      Comments: Not able to open mouth widely  Eyes:      Extraocular Movements: Extraocular movements intact.      Conjunctiva/sclera: Conjunctivae normal.      Pupils: Pupils are equal, round, and reactive to light.   Cardiovascular:      Rate and Rhythm: Normal rate and regular rhythm.      Heart sounds: Normal heart sounds.   Pulmonary:      Effort: Pulmonary effort is " normal.      Breath sounds: Normal breath sounds.   Abdominal:      General: There is no distension.      Palpations: Abdomen is soft. There is no mass.      Tenderness: There is no abdominal tenderness. There is no guarding.   Musculoskeletal:         General: No swelling or deformity.      Cervical back: Normal range of motion and neck supple.   Lymphadenopathy:      Cervical: No cervical adenopathy.   Skin:     General: Skin is warm and dry.   Neurological:      General: No focal deficit present.      Mental Status: He is oriented to person, place, and time.      Cranial Nerves: No cranial nerve deficit.      Motor: No weakness.      Gait: Gait abnormal (broad based and using a cane).      Deep Tendon Reflexes: Reflexes normal.   Psychiatric:         Behavior: Behavior is cooperative.       Recent Results (from the past 720 hour(s))   Comprehensive metabolic panel    Collection Time: 04/03/24 11:25 AM   Result Value Ref Range    Sodium 142 135 - 145 mmol/L    Potassium 3.6 3.4 - 5.3 mmol/L    Carbon Dioxide (CO2) 30 (H) 22 - 29 mmol/L    Anion Gap 10 7 - 15 mmol/L    Urea Nitrogen 17.0 8.0 - 23.0 mg/dL    Creatinine 0.89 0.67 - 1.17 mg/dL    GFR Estimate 89 >60 mL/min/1.73m2    Calcium 9.4 8.8 - 10.2 mg/dL    Chloride 102 98 - 107 mmol/L    Glucose 101 (H) 70 - 99 mg/dL    Alkaline Phosphatase 73 40 - 150 U/L    AST 21 0 - 45 U/L    ALT 13 0 - 70 U/L    Protein Total 7.4 6.4 - 8.3 g/dL    Albumin 4.4 3.5 - 5.2 g/dL    Bilirubin Total 0.6 <=1.2 mg/dL   Magnesium    Collection Time: 04/03/24 11:25 AM   Result Value Ref Range    Magnesium 2.0 1.7 - 2.3 mg/dL   CBC with platelets and differential    Collection Time: 04/03/24 11:25 AM   Result Value Ref Range    WBC Count 6.5 4.0 - 11.0 10e3/uL    RBC Count 4.59 4.40 - 5.90 10e6/uL    Hemoglobin 13.8 13.3 - 17.7 g/dL    Hematocrit 41.1 40.0 - 53.0 %    MCV 90 78 - 100 fL    MCH 30.1 26.5 - 33.0 pg    MCHC 33.6 31.5 - 36.5 g/dL    RDW 13.0 10.0 - 15.0 %    Platelet  Count 248 150 - 450 10e3/uL    % Neutrophils 65 %    % Lymphocytes 18 %    % Monocytes 11 %    % Eosinophils 4 %    % Basophils 1 %    % Immature Granulocytes 0 %    NRBCs per 100 WBC 0 <1 /100    Absolute Neutrophils 4.2 1.6 - 8.3 10e3/uL    Absolute Lymphocytes 1.2 0.8 - 5.3 10e3/uL    Absolute Monocytes 0.7 0.0 - 1.3 10e3/uL    Absolute Eosinophils 0.3 0.0 - 0.7 10e3/uL    Absolute Basophils 0.1 0.0 - 0.2 10e3/uL    Absolute Immature Granulocytes 0.0 <=0.4 10e3/uL    Absolute NRBCs 0.0 10e3/uL   Comprehensive metabolic panel    Collection Time: 04/10/24 11:00 AM   Result Value Ref Range    Sodium 141 135 - 145 mmol/L    Potassium 4.0 3.4 - 5.3 mmol/L    Carbon Dioxide (CO2) 27 22 - 29 mmol/L    Anion Gap 10 7 - 15 mmol/L    Urea Nitrogen 23.2 (H) 8.0 - 23.0 mg/dL    Creatinine 0.75 0.67 - 1.17 mg/dL    GFR Estimate >90 >60 mL/min/1.73m2    Calcium 9.0 8.8 - 10.2 mg/dL    Chloride 104 98 - 107 mmol/L    Glucose 166 (H) 70 - 99 mg/dL    Alkaline Phosphatase 69 40 - 150 U/L    AST 26 0 - 45 U/L    ALT 29 0 - 70 U/L    Protein Total 6.9 6.4 - 8.3 g/dL    Albumin 4.1 3.5 - 5.2 g/dL    Bilirubin Total 0.5 <=1.2 mg/dL   Magnesium    Collection Time: 04/10/24 11:00 AM   Result Value Ref Range    Magnesium 1.9 1.7 - 2.3 mg/dL   CBC with platelets and differential    Collection Time: 04/10/24 11:00 AM   Result Value Ref Range    WBC Count 6.1 4.0 - 11.0 10e3/uL    RBC Count 4.48 4.40 - 5.90 10e6/uL    Hemoglobin 13.3 13.3 - 17.7 g/dL    Hematocrit 39.6 (L) 40.0 - 53.0 %    MCV 88 78 - 100 fL    MCH 29.7 26.5 - 33.0 pg    MCHC 33.6 31.5 - 36.5 g/dL    RDW 12.8 10.0 - 15.0 %    Platelet Count 231 150 - 450 10e3/uL    % Neutrophils 71 %    % Lymphocytes 15 %    % Monocytes 8 %    % Eosinophils 5 %    % Basophils 0 %    % Immature Granulocytes 1 %    NRBCs per 100 WBC 0 <1 /100    Absolute Neutrophils 4.3 1.6 - 8.3 10e3/uL    Absolute Lymphocytes 0.9 0.8 - 5.3 10e3/uL    Absolute Monocytes 0.5 0.0 - 1.3 10e3/uL    Absolute  Eosinophils 0.3 0.0 - 0.7 10e3/uL    Absolute Basophils 0.0 0.0 - 0.2 10e3/uL    Absolute Immature Granulocytes 0.0 <=0.4 10e3/uL    Absolute NRBCs 0.0 10e3/uL   Comprehensive metabolic panel    Collection Time: 04/17/24  9:26 AM   Result Value Ref Range    Sodium 140 135 - 145 mmol/L    Potassium 3.6 3.4 - 5.3 mmol/L    Carbon Dioxide (CO2) 28 22 - 29 mmol/L    Anion Gap 10 7 - 15 mmol/L    Urea Nitrogen 18.4 8.0 - 23.0 mg/dL    Creatinine 0.74 0.67 - 1.17 mg/dL    GFR Estimate >90 >60 mL/min/1.73m2    Calcium 8.8 8.8 - 10.2 mg/dL    Chloride 102 98 - 107 mmol/L    Glucose 142 (H) 70 - 99 mg/dL    Alkaline Phosphatase 67 40 - 150 U/L    AST 21 0 - 45 U/L    ALT 22 0 - 70 U/L    Protein Total 6.6 6.4 - 8.3 g/dL    Albumin 4.0 3.5 - 5.2 g/dL    Bilirubin Total 0.5 <=1.2 mg/dL   Magnesium    Collection Time: 04/17/24  9:26 AM   Result Value Ref Range    Magnesium 1.8 1.7 - 2.3 mg/dL   CBC with platelets and differential    Collection Time: 04/17/24  9:26 AM   Result Value Ref Range    WBC Count 6.4 4.0 - 11.0 10e3/uL    RBC Count 4.42 4.40 - 5.90 10e6/uL    Hemoglobin 13.2 (L) 13.3 - 17.7 g/dL    Hematocrit 39.5 (L) 40.0 - 53.0 %    MCV 89 78 - 100 fL    MCH 29.9 26.5 - 33.0 pg    MCHC 33.4 31.5 - 36.5 g/dL    RDW 13.0 10.0 - 15.0 %    Platelet Count 198 150 - 450 10e3/uL    % Neutrophils 80 %    % Lymphocytes 9 %    % Monocytes 8 %    % Eosinophils 2 %    % Basophils 1 %    % Immature Granulocytes 0 %    NRBCs per 100 WBC 0 <1 /100    Absolute Neutrophils 5.1 1.6 - 8.3 10e3/uL    Absolute Lymphocytes 0.6 (L) 0.8 - 5.3 10e3/uL    Absolute Monocytes 0.5 0.0 - 1.3 10e3/uL    Absolute Eosinophils 0.1 0.0 - 0.7 10e3/uL    Absolute Basophils 0.0 0.0 - 0.2 10e3/uL    Absolute Immature Granulocytes 0.0 <=0.4 10e3/uL    Absolute NRBCs 0.0 10e3/uL   Comprehensive metabolic panel    Collection Time: 04/24/24  8:37 AM   Result Value Ref Range    Sodium 141 135 - 145 mmol/L    Potassium 3.4 3.4 - 5.3 mmol/L    Carbon Dioxide  (CO2) 27 22 - 29 mmol/L    Anion Gap 10 7 - 15 mmol/L    Urea Nitrogen 20.6 8.0 - 23.0 mg/dL    Creatinine 0.76 0.67 - 1.17 mg/dL    GFR Estimate >90 >60 mL/min/1.73m2    Calcium 8.8 8.8 - 10.2 mg/dL    Chloride 104 98 - 107 mmol/L    Glucose 129 (H) 70 - 99 mg/dL    Alkaline Phosphatase 67 40 - 150 U/L    AST 18 0 - 45 U/L    ALT 15 0 - 70 U/L    Protein Total 6.8 6.4 - 8.3 g/dL    Albumin 3.9 3.5 - 5.2 g/dL    Bilirubin Total 0.4 <=1.2 mg/dL   Magnesium    Collection Time: 04/24/24  8:37 AM   Result Value Ref Range    Magnesium 1.9 1.7 - 2.3 mg/dL   CBC with platelets and differential    Collection Time: 04/24/24  8:37 AM   Result Value Ref Range    WBC Count 6.1 4.0 - 11.0 10e3/uL    RBC Count 4.20 (L) 4.40 - 5.90 10e6/uL    Hemoglobin 12.9 (L) 13.3 - 17.7 g/dL    Hematocrit 37.6 (L) 40.0 - 53.0 %    MCV 90 78 - 100 fL    MCH 30.7 26.5 - 33.0 pg    MCHC 34.3 31.5 - 36.5 g/dL    RDW 13.5 10.0 - 15.0 %    Platelet Count 205 150 - 450 10e3/uL    % Neutrophils 80 %    % Lymphocytes 10 %    % Monocytes 7 %    % Eosinophils 2 %    % Basophils 1 %    % Immature Granulocytes 1 %    NRBCs per 100 WBC 0 <1 /100    Absolute Neutrophils 4.9 1.6 - 8.3 10e3/uL    Absolute Lymphocytes 0.6 (L) 0.8 - 5.3 10e3/uL    Absolute Monocytes 0.4 0.0 - 1.3 10e3/uL    Absolute Eosinophils 0.1 0.0 - 0.7 10e3/uL    Absolute Basophils 0.0 0.0 - 0.2 10e3/uL    Absolute Immature Granulocytes 0.0 <=0.4 10e3/uL    Absolute NRBCs 0.0 10e3/uL           No results found for this or any previous visit (from the past 744 hour(s)).

## 2024-04-24 NOTE — LETTER
2024         RE: Donovan Yi  [address] [address]        Dear Colleague,    Thank you for referring your patient, Donovan Yi, to the [facility] RADIATION ONCOLOGY [place]. Please see a copy of my visit note below.    RADIATION ONCOLOGY WEEKLY TREATMENT VISIT NOTE      Assessment / Impression       Visit Dx:  (C01) Malignant neoplasm of base of tongue (H)  (primary encounter diagnosis)    Tolerating radiation therapy well.  All questions and concerns addressed.    Plan:     Continue radiation treatment as prescribed.    Patient noticed mild dysphagia at this time and is able to get adequate daily calorie and fluid intake.  We will continue monitor patient closely.    Subjective:      HPI: Donovan Yi is a 73 year old male with  Malignant neoplasm of base of tongue (H) [C01]    The following portions of the patient's history were reviewed and updated as appropriate: allergies, current medications, past family history, past medical history, past social history, past surgical history and problem list.    Assessment                  Body Site:  Head and Neck Site: BOT  Stereotactic Radiosurgery: No  Concurrent Therapy: Yes (weekly cisplatin)  Today's Dose: 2800  Total Dose for Head and Neck: 7000  Today's Fraction/Total Fraction Head and Neck: 1435  Mucositis due to radiation: 0: None  Thrush: 0: Absent  Pharynx and Esphogaus: 2: Tolerates soft diet  Voice Chances/Stridor/Larynx: 0: Normal  Ocular/Visual - other : 0: Normal  Middle ear/hearin: Normal  Tinnitus: 0: No  Cough: 0: Absent                                   Sexuality Alteration                    Emotional Alteration    Copin: Ineffective  Comfort Alteration   KPS: 90% Can perform normal activity, minor signs of disease  Fatigue (ONS scale): 0: No Fatigue  Pain Location: mouth  Pain Intensity. Rate degree of pain ranging from 0 (no pain) to 10 (severe pain): 3  Pain  Description: Ache - Muscular type ache  Pain Intervention: 3: Opiods (hydrocodone, gabapentin, belbuca, magic mouthwash)  Effectiveness of pain intervention: 2: Pain relieved 50%   Nutrition Alteration   Anorexia: 0: None  Nausea: 0: None  Vomitin: None  Weight: 80.3 kg (177 lb)  Pharynx and Esphogaus: 2: Tolerates soft diet  Skin Alteration   Skin Sensation: 0: No problem  Skin Reaction: 0: None  AUA Assessment                                           Accompanied by       Objective:     Exam: mild Erythema.    Vitals:    24 1228   BP: (!) 172/81   Pulse: 77   Resp: 16   Temp: 97.6  F (36.4  C)   TempSrc: Oral   SpO2: 100%   Weight: 80.3 kg (177 lb)   PainSc: Mild Pain (2)   PainLoc: Other - see comment       Wt Readings from Last 8 Encounters:   24 80.3 kg (177 lb)   24 80.7 kg (177 lb 14.4 oz)   24 81.5 kg (179 lb 11.2 oz)   24 82.2 kg (181 lb 4.8 oz)   24 81.6 kg (180 lb)   04/10/24 82.3 kg (181 lb 6.4 oz)   04/10/24 82.6 kg (182 lb)   24 82.4 kg (181 lb 11.2 oz)       General: Alert and oriented, in no acute distress  Christopher has mild Erythema.  Aria chart and setup information reviewed    Joyce Yang MD      Again, thank you for allowing me to participate in the care of your patient.        Sincerely,        Joyce Yang MD

## 2024-04-24 NOTE — PROGRESS NOTES
Infusion Nursing Note:  Donovan Yi presents today for C1D22 Cisplatin.    Patient seen by provider today: Yes: Dr. Huertas   present during visit today: Not Applicable.    Note: Premedicated with Aloxi and Emend/Dexamethasone. One liter of fluids given before his Cisplatin.      Intravenous Access:  Peripheral IV placed.    Treatment Conditions:  Results reviewed, labs MET treatment parameters, ok to proceed with treatment.      Post Infusion Assessment:  Patient tolerated infusion without incident.  Site patent and intact, free from redness, edema or discomfort.  No evidence of extravasations.  Access discontinued per protocol.       Discharge Plan:   Patient and/or family verbalized understanding of discharge instructions and all questions answered.      Mahsa Roman RN

## 2024-04-24 NOTE — PROGRESS NOTES
"Oncology Rooming Note    April 24, 2024 8:52 AM   Donovan Yi is a 73 year old male who presents for:    Chief Complaint   Patient presents with    Oncology Clinic Visit     Primary squamous cell carcinoma of base of tongue     Initial Vitals: BP (!) 144/73 (Patient Position: Sitting)   Pulse 75   Temp 97.9  F (36.6  C) (Oral)   Resp 16   Ht 1.765 m (5' 9.5\")   Wt 80.7 kg (177 lb 14.4 oz)   SpO2 98%   BMI 25.89 kg/m   Estimated body mass index is 25.89 kg/m  as calculated from the following:    Height as of this encounter: 1.765 m (5' 9.5\").    Weight as of this encounter: 80.7 kg (177 lb 14.4 oz). Body surface area is 1.99 meters squared.  Severe Pain (6) Comment: Data Unavailable   No LMP for male patient.  Allergies reviewed: Yes  Medications reviewed: Yes    Medications: Medication refills not needed today.  Pharmacy name entered into [facility]: [id number] [address] AT [facility]    Frailty Screening:   Is the patient here for a new oncology consult visit in cancer care? 2. No      Clinical concerns: Mouth pain rated at 6. Fatigue.       Mariela Villa LPN              "

## 2024-04-25 ENCOUNTER — APPOINTMENT (OUTPATIENT)
Dept: RADIATION ONCOLOGY | Facility: CLINIC | Age: 74
End: 2024-04-25
Attending: RADIOLOGY
Payer: MEDICARE

## 2024-04-25 PROCEDURE — 77336 RADIATION PHYSICS CONSULT: CPT | Performed by: RADIOLOGY

## 2024-04-25 PROCEDURE — 77014 PR CT GUIDE FOR PLACEMENT RADIATION THERAPY FIELDS: CPT | Mod: 26 | Performed by: STUDENT IN AN ORGANIZED HEALTH CARE EDUCATION/TRAINING PROGRAM

## 2024-04-25 PROCEDURE — 77386 HC IMRT TREATMENT DELIVERY, COMPLEX: CPT | Performed by: STUDENT IN AN ORGANIZED HEALTH CARE EDUCATION/TRAINING PROGRAM

## 2024-04-25 PROCEDURE — 77427 RADIATION TX MANAGEMENT X5: CPT | Performed by: RADIOLOGY

## 2024-04-26 ENCOUNTER — APPOINTMENT (OUTPATIENT)
Dept: RADIATION ONCOLOGY | Facility: CLINIC | Age: 74
End: 2024-04-26
Attending: RADIOLOGY
Payer: MEDICARE

## 2024-04-26 PROCEDURE — 77014 PR CT GUIDE FOR PLACEMENT RADIATION THERAPY FIELDS: CPT | Mod: 26 | Performed by: STUDENT IN AN ORGANIZED HEALTH CARE EDUCATION/TRAINING PROGRAM

## 2024-04-26 PROCEDURE — 77386 HC IMRT TREATMENT DELIVERY, COMPLEX: CPT | Performed by: STUDENT IN AN ORGANIZED HEALTH CARE EDUCATION/TRAINING PROGRAM

## 2024-04-28 NOTE — PROGRESS NOTES
[facility] Hematology and Oncology Progress Note    Patient: Donovan Yi  MRN: [id number]  Date of Service: May 6, 2024        Assessment and Plan:    1.  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  He is having significant difficulties with local side effects on the posterior oropharynx.  Lots of discomfort and difficulty swallowing.  He is questioning if he is going to complete radiation.  He will receive his sixth and final dose of chemotherapy this week.  Seems to be tolerating this okay.  Minimal nausea with no neuropathy.  I reviewed with them that typically I like to get a CT scan a few weeks after radiation is complete.  We then proceeded with a PET scan about 3 months afterwards.  He still has just over 2 weeks of radiation left.    2.  Right upper lobe groundglass nodule: 4.2 cm.  Minimal FDG uptake. Follow radiographically.  If evidence of enlargement may need to biopsy and treated with radiation.  Unlikely to be related to the tongue cancer.    3.  F/E/N: He does not have a feeding tube in place.  He is eating mostly smoothies.  I reviewed his labs from May 1.  He has a slightly low magnesium at 1.5 and hypokalemia with a potassium of 3.0.  These can be replaced when he comes in for treatment.  Total protein and albumin were normal.    4.  Possible BPH: He has nocturia 4 times a night.  He does endorse that sometimes it feels like he does not empty his bladder completely.  He is on Flomax.  We could add add finasteride 5 mg once daily as a next step.    5.  Oropharyngeal discomfort/mucositis: He does have some tongue and mucosal ulcerations.  He has been taking Magic mouthwash.  I am going to add viscous lidocaine.  I am also going to add Roxanol.  He is taking gabapentin for his back.  He also started taking buprenorphine which she has had for his back pain.  I told him that he should stop these unless he finds them helpful for  his chronic low back pain.    6.  Prophylaxis: Asked him to keep an eye on his bowel habits and if he notices frequency of stooling decreasing or if he has constipation we will talk about stool softeners and laxatives.    Medical decision Making:  I spent 41 minutes in the care of this patient today, which included time necessary for preparation for the visit, face to face time with the patient, communication of recommendations to the care team, and documentation time.  Today's visit was centered around a cancer diagnosis in the setting of additional medical comorbidities. Complex medical decision making was required. The risk of additional morbidity without further treatment is high.     ECOG Performance  0    Diagnosis:    Base of tongue cancer:  Diagnosed February, 2024.  Left posterior tongue.  Biopsy showed p16 positive squamous cell carcinoma.  PET scan showed a hypermetabolic mass at the left tongue base which extends inferiorly into the vallecula.  Measures 2.7 x 2.6 x 4.4 cm.  No evidence of latricia or metastatic     Treatment:    Concurrent radiation and chemotherapy started on April 3, 2024.  Weekly cisplatin.    Interim History:    Phani returns today for follow-up visit.  He has about 2-1/2 weeks left of his radiation.  Having significant local problems with pain and irritation.  Also having more fatigue.  He has an uncomfortable ulcer on the lateral right tongue from friction against tooth.  He has small sores.  Denies nausea or vomiting or peripheral neuropathy symptoms.    Review of Systems:    As above in the history.     Review of Systems otherwise Negative for:  General: chills, fever or night sweats  Psychological: anxiety or depression  Ophthalmic: blurry vision, double vision or loss of vision, vision change  ENT: epistaxis, hearing changes  Hematological and Lymphatic: bleeding, bruising, jaundice, swollen lymph nodes  Endocrine: hot flashes, unexpected weight changes  Respiratory: cough,  hemoptysis, orthopnea or shortness of breath/FLETCHER  Cardiovascular: chest pain, edema, palpitations or PND  Gastrointestinal: abdominal pain, blood in stools, change in bowel habits, constipation  Genito-Urinary: change in urinary stream, incontinence, frequency/urgency  Musculoskeletal: joint pain, stiffness, swelling, muscle pain  Neurological: dizziness, headaches, numbness/tingling  Dermatological: lumps and rash    Past History:    Past Medical History:   Diagnosis Date    Food intolerance in adult     Hypertension     Malignant neoplasm of base of tongue (H)      Physical Exam:    There were no vitals taken for this visit.    General: patient appears stated age of 73 year old. Nontoxic and in no distress.   HEENT: Head: atraumatic, normocephalic. Sclerae anicteric.  Ulcerations on the lateral right tongue and left buccal mucosa.  Posterior oropharynx is erythematous mucous membranes are dry.  Chest:  Normal respiratory effort  Cardiac:  No edema.   Abdomen: abdomen is non-distended  Extremities: normal tone and muscle bulk.  Skin: no lesions or rash on visible skin. Warm and dry.   CNS: alert and oriented. Grossly non-focal.   Psychiatric: normal mood and affect.     Lab Results:    Recent Results (from the past 168 hour(s))   Comprehensive metabolic panel   Result Value Ref Range    Sodium 141 135 - 145 mmol/L    Potassium 3.4 3.4 - 5.3 mmol/L    Carbon Dioxide (CO2) 27 22 - 29 mmol/L    Anion Gap 10 7 - 15 mmol/L    Urea Nitrogen 20.6 8.0 - 23.0 mg/dL    Creatinine 0.76 0.67 - 1.17 mg/dL    GFR Estimate >90 >60 mL/min/1.73m2    Calcium 8.8 8.8 - 10.2 mg/dL    Chloride 104 98 - 107 mmol/L    Glucose 129 (H) 70 - 99 mg/dL    Alkaline Phosphatase 67 40 - 150 U/L    AST 18 0 - 45 U/L    ALT 15 0 - 70 U/L    Protein Total 6.8 6.4 - 8.3 g/dL    Albumin 3.9 3.5 - 5.2 g/dL    Bilirubin Total 0.4 <=1.2 mg/dL   Magnesium   Result Value Ref Range    Magnesium 1.9 1.7 - 2.3 mg/dL   CBC with platelets and differential    Result Value Ref Range    WBC Count 6.1 4.0 - 11.0 10e3/uL    RBC Count 4.20 (L) 4.40 - 5.90 10e6/uL    Hemoglobin 12.9 (L) 13.3 - 17.7 g/dL    Hematocrit 37.6 (L) 40.0 - 53.0 %    MCV 90 78 - 100 fL    MCH 30.7 26.5 - 33.0 pg    MCHC 34.3 31.5 - 36.5 g/dL    RDW 13.5 10.0 - 15.0 %    Platelet Count 205 150 - 450 10e3/uL    % Neutrophils 80 %    % Lymphocytes 10 %    % Monocytes 7 %    % Eosinophils 2 %    % Basophils 1 %    % Immature Granulocytes 1 %    NRBCs per 100 WBC 0 <1 /100    Absolute Neutrophils 4.9 1.6 - 8.3 10e3/uL    Absolute Lymphocytes 0.6 (L) 0.8 - 5.3 10e3/uL    Absolute Monocytes 0.4 0.0 - 1.3 10e3/uL    Absolute Eosinophils 0.1 0.0 - 0.7 10e3/uL    Absolute Basophils 0.0 0.0 - 0.2 10e3/uL    Absolute Immature Granulocytes 0.0 <=0.4 10e3/uL    Absolute NRBCs 0.0 10e3/uL     Imaging:    No results found.    Signed by: Michael Aldana MD

## 2024-04-29 ENCOUNTER — APPOINTMENT (OUTPATIENT)
Dept: RADIATION ONCOLOGY | Facility: CLINIC | Age: 74
End: 2024-04-29
Attending: RADIOLOGY
Payer: MEDICARE

## 2024-04-29 PROCEDURE — 77386 HC IMRT TREATMENT DELIVERY, COMPLEX: CPT | Performed by: RADIOLOGY

## 2024-04-29 PROCEDURE — 77014 PR CT GUIDE FOR PLACEMENT RADIATION THERAPY FIELDS: CPT | Mod: 26 | Performed by: RADIOLOGY

## 2024-04-30 ENCOUNTER — VIRTUAL VISIT (OUTPATIENT)
Dept: SPEECH THERAPY | Facility: CLINIC | Age: 74
End: 2024-04-30
Payer: MEDICARE

## 2024-04-30 ENCOUNTER — APPOINTMENT (OUTPATIENT)
Dept: RADIATION ONCOLOGY | Facility: CLINIC | Age: 74
End: 2024-04-30
Attending: RADIOLOGY
Payer: MEDICARE

## 2024-04-30 DIAGNOSIS — R13.12 OROPHARYNGEAL DYSPHAGIA: ICD-10-CM

## 2024-04-30 DIAGNOSIS — C01 PRIMARY SQUAMOUS CELL CARCINOMA OF BASE OF TONGUE (H): Primary | ICD-10-CM

## 2024-04-30 PROCEDURE — 92526 ORAL FUNCTION THERAPY: CPT | Mod: GN | Performed by: SPEECH-LANGUAGE PATHOLOGIST

## 2024-04-30 PROCEDURE — 77014 PR CT GUIDE FOR PLACEMENT RADIATION THERAPY FIELDS: CPT | Mod: 26 | Performed by: RADIOLOGY

## 2024-04-30 PROCEDURE — 77386 HC IMRT TREATMENT DELIVERY, COMPLEX: CPT | Performed by: RADIOLOGY

## 2024-05-01 ENCOUNTER — APPOINTMENT (OUTPATIENT)
Dept: RADIATION ONCOLOGY | Facility: CLINIC | Age: 74
End: 2024-05-01
Attending: RADIOLOGY
Payer: MEDICARE

## 2024-05-01 ENCOUNTER — INFUSION THERAPY VISIT (OUTPATIENT)
Dept: INFUSION THERAPY | Facility: HOSPITAL | Age: 74
End: 2024-05-01
Attending: INTERNAL MEDICINE
Payer: MEDICARE

## 2024-05-01 VITALS
WEIGHT: 180.2 LBS | RESPIRATION RATE: 16 BRPM | HEART RATE: 68 BPM | BODY MASS INDEX: 26.23 KG/M2 | OXYGEN SATURATION: 96 % | TEMPERATURE: 98.6 F | DIASTOLIC BLOOD PRESSURE: 70 MMHG | SYSTOLIC BLOOD PRESSURE: 155 MMHG

## 2024-05-01 VITALS
OXYGEN SATURATION: 96 % | DIASTOLIC BLOOD PRESSURE: 70 MMHG | SYSTOLIC BLOOD PRESSURE: 155 MMHG | HEART RATE: 68 BPM | RESPIRATION RATE: 16 BRPM | TEMPERATURE: 98.6 F

## 2024-05-01 DIAGNOSIS — C01 PRIMARY SQUAMOUS CELL CARCINOMA OF BASE OF TONGUE (H): Primary | ICD-10-CM

## 2024-05-01 DIAGNOSIS — C01 MALIGNANT NEOPLASM OF BASE OF TONGUE (H): Primary | ICD-10-CM

## 2024-05-01 LAB
ALBUMIN SERPL BCG-MCNC: 4 G/DL (ref 3.5–5.2)
ALP SERPL-CCNC: 64 U/L (ref 40–150)
ALT SERPL W P-5'-P-CCNC: 15 U/L (ref 0–70)
ANION GAP SERPL CALCULATED.3IONS-SCNC: 9 MMOL/L (ref 7–15)
AST SERPL W P-5'-P-CCNC: 14 U/L (ref 0–45)
BASOPHILS # BLD AUTO: 0 10E3/UL (ref 0–0.2)
BASOPHILS NFR BLD AUTO: 0 %
BILIRUB SERPL-MCNC: 0.5 MG/DL
BUN SERPL-MCNC: 17.3 MG/DL (ref 8–23)
CALCIUM SERPL-MCNC: 8.3 MG/DL (ref 8.8–10.2)
CHLORIDE SERPL-SCNC: 99 MMOL/L (ref 98–107)
CREAT SERPL-MCNC: 0.76 MG/DL (ref 0.67–1.17)
DEPRECATED HCO3 PLAS-SCNC: 32 MMOL/L (ref 22–29)
EGFRCR SERPLBLD CKD-EPI 2021: >90 ML/MIN/1.73M2
EOSINOPHIL # BLD AUTO: 0 10E3/UL (ref 0–0.7)
EOSINOPHIL NFR BLD AUTO: 1 %
ERYTHROCYTE [DISTWIDTH] IN BLOOD BY AUTOMATED COUNT: 14 % (ref 10–15)
GLUCOSE SERPL-MCNC: 125 MG/DL (ref 70–99)
HCT VFR BLD AUTO: 34.6 % (ref 40–53)
HGB BLD-MCNC: 11.8 G/DL (ref 13.3–17.7)
IMM GRANULOCYTES # BLD: 0 10E3/UL
IMM GRANULOCYTES NFR BLD: 0 %
LYMPHOCYTES # BLD AUTO: 0.3 10E3/UL (ref 0.8–5.3)
LYMPHOCYTES NFR BLD AUTO: 10 %
MAGNESIUM SERPL-MCNC: 1.5 MG/DL (ref 1.7–2.3)
MCH RBC QN AUTO: 30.3 PG (ref 26.5–33)
MCHC RBC AUTO-ENTMCNC: 34.1 G/DL (ref 31.5–36.5)
MCV RBC AUTO: 89 FL (ref 78–100)
MONOCYTES # BLD AUTO: 0.5 10E3/UL (ref 0–1.3)
MONOCYTES NFR BLD AUTO: 14 %
NEUTROPHILS # BLD AUTO: 2.5 10E3/UL (ref 1.6–8.3)
NEUTROPHILS NFR BLD AUTO: 74 %
NRBC # BLD AUTO: 0 10E3/UL
NRBC BLD AUTO-RTO: 0 /100
PLATELET # BLD AUTO: 170 10E3/UL (ref 150–450)
POTASSIUM SERPL-SCNC: 3 MMOL/L (ref 3.4–5.3)
PROT SERPL-MCNC: 6.6 G/DL (ref 6.4–8.3)
RBC # BLD AUTO: 3.9 10E6/UL (ref 4.4–5.9)
SODIUM SERPL-SCNC: 140 MMOL/L (ref 135–145)
WBC # BLD AUTO: 3.3 10E3/UL (ref 4–11)

## 2024-05-01 PROCEDURE — 96367 TX/PROPH/DG ADDL SEQ IV INF: CPT

## 2024-05-01 PROCEDURE — 258N000003 HC RX IP 258 OP 636: Performed by: INTERNAL MEDICINE

## 2024-05-01 PROCEDURE — 96413 CHEMO IV INFUSION 1 HR: CPT

## 2024-05-01 PROCEDURE — 77014 PR CT GUIDE FOR PLACEMENT RADIATION THERAPY FIELDS: CPT | Mod: 26 | Performed by: RADIOLOGY

## 2024-05-01 PROCEDURE — 83735 ASSAY OF MAGNESIUM: CPT | Performed by: INTERNAL MEDICINE

## 2024-05-01 PROCEDURE — 96375 TX/PRO/DX INJ NEW DRUG ADDON: CPT

## 2024-05-01 PROCEDURE — 36415 COLL VENOUS BLD VENIPUNCTURE: CPT | Performed by: INTERNAL MEDICINE

## 2024-05-01 PROCEDURE — 96361 HYDRATE IV INFUSION ADD-ON: CPT

## 2024-05-01 PROCEDURE — 85025 COMPLETE CBC W/AUTO DIFF WBC: CPT | Performed by: INTERNAL MEDICINE

## 2024-05-01 PROCEDURE — 250N000011 HC RX IP 250 OP 636: Performed by: INTERNAL MEDICINE

## 2024-05-01 PROCEDURE — 80053 COMPREHEN METABOLIC PANEL: CPT | Performed by: INTERNAL MEDICINE

## 2024-05-01 PROCEDURE — 77386 HC IMRT TREATMENT DELIVERY, COMPLEX: CPT | Performed by: RADIOLOGY

## 2024-05-01 RX ORDER — METHYLPREDNISOLONE SODIUM SUCCINATE 125 MG/2ML
125 INJECTION, POWDER, LYOPHILIZED, FOR SOLUTION INTRAMUSCULAR; INTRAVENOUS
Status: DISCONTINUED | OUTPATIENT
Start: 2024-05-01 | End: 2024-05-01 | Stop reason: HOSPADM

## 2024-05-01 RX ORDER — EPINEPHRINE 1 MG/ML
0.3 INJECTION, SOLUTION INTRAMUSCULAR; SUBCUTANEOUS EVERY 5 MIN PRN
Status: DISCONTINUED | OUTPATIENT
Start: 2024-05-01 | End: 2024-05-01 | Stop reason: HOSPADM

## 2024-05-01 RX ORDER — ALBUTEROL SULFATE 0.83 MG/ML
2.5 SOLUTION RESPIRATORY (INHALATION)
Status: DISCONTINUED | OUTPATIENT
Start: 2024-05-01 | End: 2024-05-01 | Stop reason: HOSPADM

## 2024-05-01 RX ORDER — PALONOSETRON 0.05 MG/ML
0.25 INJECTION, SOLUTION INTRAVENOUS ONCE
Status: COMPLETED | OUTPATIENT
Start: 2024-05-01 | End: 2024-05-01

## 2024-05-01 RX ORDER — DIPHENHYDRAMINE HYDROCHLORIDE 50 MG/ML
50 INJECTION INTRAMUSCULAR; INTRAVENOUS
Status: DISCONTINUED | OUTPATIENT
Start: 2024-05-01 | End: 2024-05-01 | Stop reason: HOSPADM

## 2024-05-01 RX ORDER — MEPERIDINE HYDROCHLORIDE 50 MG/ML
25 INJECTION INTRAMUSCULAR; INTRAVENOUS; SUBCUTANEOUS EVERY 30 MIN PRN
Status: DISCONTINUED | OUTPATIENT
Start: 2024-05-01 | End: 2024-05-01 | Stop reason: HOSPADM

## 2024-05-01 RX ORDER — HEPARIN SODIUM (PORCINE) LOCK FLUSH IV SOLN 100 UNIT/ML 100 UNIT/ML
5 SOLUTION INTRAVENOUS
Status: DISCONTINUED | OUTPATIENT
Start: 2024-05-01 | End: 2024-05-01 | Stop reason: HOSPADM

## 2024-05-01 RX ORDER — ALBUTEROL SULFATE 90 UG/1
1-2 AEROSOL, METERED RESPIRATORY (INHALATION)
Status: DISCONTINUED | OUTPATIENT
Start: 2024-05-01 | End: 2024-05-01 | Stop reason: HOSPADM

## 2024-05-01 RX ADMIN — SODIUM CHLORIDE 81 MG: 9 INJECTION, SOLUTION INTRAVENOUS at 11:29

## 2024-05-01 RX ADMIN — SODIUM CHLORIDE 1000 ML: 9 INJECTION, SOLUTION INTRAVENOUS at 09:52

## 2024-05-01 RX ADMIN — PALONOSETRON 0.25 MG: 0.05 INJECTION, SOLUTION INTRAVENOUS at 10:31

## 2024-05-01 RX ADMIN — FOSAPREPITANT: 150 INJECTION, POWDER, LYOPHILIZED, FOR SOLUTION INTRAVENOUS at 10:32

## 2024-05-01 NOTE — LETTER
2024         RE: Donovan Yi  [address] [address]        Dear Colleague,    Thank you for referring your patient, Donovan Yi, to the [facility] RADIATION ONCOLOGY [place]. Please see a copy of my visit note below.    RADIATION ONCOLOGY WEEKLY TREATMENT VISIT NOTE      Assessment / Impression       Visit Dx:  (C01) Malignant neoplasm of base of tongue (H)  (primary encounter diagnosis)    Tolerating radiation therapy well.  All questions and concerns addressed.    Plan:     Continue radiation treatment as prescribed.    His pain is manageable with pain medication.    Discussed with patient about appropriate nutritional support during the therapy.    We will continue monitor patient closely.     Subjective:      HPI: Donovan Yi is a 74 year old male with  Malignant neoplasm of base of tongue (H) [C01]    The following portions of the patient's history were reviewed and updated as appropriate: allergies, current medications, past family history, past medical history, past social history, past surgical history and problem list.    Assessment                  Body Site:  Head and Neck Site: BOT  Stereotactic Radiosurgery: No  Concurrent Therapy: Yes (weekly cisplatin)  Today's Dose: 3800  Total Dose for Head and Neck: 7000  Today's Fraction/Total Fraction Head and Neck: 1935  Mucositis due to radiation: 0: None  Thrush: 0: Absent  Pharynx and Esphogaus: 2: Tolerates soft diet  Voice Chances/Stridor/Larynx: 0: Normal  Ocular/Visual - other : 0: Normal  Middle ear/hearin: Normal  Tinnitus: 0: No  Cough: 0: Absent                                   Sexuality Alteration                    Emotional Alteration    Copin: Ineffective  Comfort Alteration   KPS: 90% Can perform normal activity, minor signs of disease  Fatigue (ONS scale): 0: No Fatigue  Pain Location: mouth/throat  Pain Intensity. Rate degree of pain ranging from 0 (no pain)  to 10 (severe pain): 5  Pain Description: Ache - Muscular type ache  Pain Intervention: 3: Opiods (hydrocodone, gabapentin, belbuca, magic mouthwash)  Effectiveness of pain intervention: 2: Pain relieved 50%   Nutrition Alteration   Anorexia: 1: Loss of appetite  Nausea: 0: None  Vomitin: None  Weight: 81.7 kg (180 lb 3.2 oz)  Pharynx and Esphogaus: 2: Tolerates soft diet  Skin Alteration   Skin Sensation: 0: No problem  Skin Reaction: 2: Bright erythema  AUA Assessment                                           Accompanied by       Objective:     Exam: mild Erythema with no sign of fungal infection.    Vitals:    24 1248   BP: (!) 155/70   Pulse: 68   Resp: 16   Temp: 98.6  F (37  C)   SpO2: 96%   Weight: 81.7 kg (180 lb 3.2 oz)       Wt Readings from Last 8 Encounters:   24 81.7 kg (180 lb 3.2 oz)   24 80.3 kg (177 lb)   24 80.7 kg (177 lb 14.4 oz)   24 81.5 kg (179 lb 11.2 oz)   24 82.2 kg (181 lb 4.8 oz)   24 81.6 kg (180 lb)   04/10/24 82.3 kg (181 lb 6.4 oz)   04/10/24 82.6 kg (182 lb)       General: Alert and oriented, in no acute distress  Christopher has mild Erythema.  Aria chart and setup information reviewed    Joyce Yang MD      Again, thank you for allowing me to participate in the care of your patient.        Sincerely,        Joyce Yang MD

## 2024-05-01 NOTE — PROGRESS NOTES
RADIATION ONCOLOGY WEEKLY TREATMENT VISIT NOTE      Assessment / Impression       Visit Dx:  (C01) Malignant neoplasm of base of tongue (H)  (primary encounter diagnosis)    Tolerating radiation therapy well.  All questions and concerns addressed.    Plan:     Continue radiation treatment as prescribed.    His pain is manageable with pain medication.    Discussed with patient about appropriate nutritional support during the therapy.    We will continue monitor patient closely.     Subjective:      HPI: Donovan Yi is a 74 year old male with  Malignant neoplasm of base of tongue (H) [C01]    The following portions of the patient's history were reviewed and updated as appropriate: allergies, current medications, past family history, past medical history, past social history, past surgical history and problem list.    Assessment                  Body Site:  Head and Neck Site: BOT  Stereotactic Radiosurgery: No  Concurrent Therapy: Yes (weekly cisplatin)  Today's Dose: 3800  Total Dose for Head and Neck: 7000  Today's Fraction/Total Fraction Head and Neck: 19/35  Mucositis due to radiation: 0: None  Thrush: 0: Absent  Pharynx and Esphogaus: 2: Tolerates soft diet  Voice Chances/Stridor/Larynx: 0: Normal  Ocular/Visual - other : 0: Normal  Middle ear/hearin: Normal  Tinnitus: 0: No  Cough: 0: Absent                                   Sexuality Alteration                    Emotional Alteration    Copin: Ineffective  Comfort Alteration   KPS: 90% Can perform normal activity, minor signs of disease  Fatigue (ONS scale): 0: No Fatigue  Pain Location: mouth/throat  Pain Intensity. Rate degree of pain ranging from 0 (no pain) to 10 (severe pain): 5  Pain Description: Ache - Muscular type ache  Pain Intervention: 3: Opiods (hydrocodone, gabapentin, belbuca, magic mouthwash)  Effectiveness of pain intervention: 2: Pain relieved 50%   Nutrition Alteration   Anorexia: 1: Loss of appetite  Nausea: 0:  None  Vomitin: None  Weight: 81.7 kg (180 lb 3.2 oz)  Pharynx and Esphogaus: 2: Tolerates soft diet  Skin Alteration   Skin Sensation: 0: No problem  Skin Reaction: 2: Bright erythema  AUA Assessment                                           Accompanied by       Objective:     Exam: mild Erythema with no sign of fungal infection.    Vitals:    24 1248   BP: (!) 155/70   Pulse: 68   Resp: 16   Temp: 98.6  F (37  C)   SpO2: 96%   Weight: 81.7 kg (180 lb 3.2 oz)       Wt Readings from Last 8 Encounters:   24 81.7 kg (180 lb 3.2 oz)   24 80.3 kg (177 lb)   24 80.7 kg (177 lb 14.4 oz)   24 81.5 kg (179 lb 11.2 oz)   24 82.2 kg (181 lb 4.8 oz)   24 81.6 kg (180 lb)   04/10/24 82.3 kg (181 lb 6.4 oz)   04/10/24 82.6 kg (182 lb)       General: Alert and oriented, in no acute distress  Christopher has mild Erythema.  Aria chart and setup information reviewed    Joyce Yang MD

## 2024-05-01 NOTE — PROGRESS NOTES
Infusion Nursing Note:  Donovan Yi presents today for D27C1 chemotherapy.    Patient seen by provider today: No   present during visit today: Not Applicable.    Note: Tolerated treatment well today, offers no complaints, ambulated to radiation with wife at discharge.      Intravenous Access:  Labs drawn without difficulty.  Peripheral IV placed.    Treatment Conditions:  Results reviewed, labs MET treatment parameters, ok to proceed with treatment.      Post Infusion Assessment:  Patient tolerated infusion without incident.  Blood return noted pre and post infusion.  No evidence of extravasations.  Access discontinued per protocol.       Discharge Plan:   Patient and/or family verbalized understanding of discharge instructions and all questions answered.      Radha Matson RN

## 2024-05-02 ENCOUNTER — APPOINTMENT (OUTPATIENT)
Dept: RADIATION ONCOLOGY | Facility: CLINIC | Age: 74
End: 2024-05-02
Attending: RADIOLOGY
Payer: MEDICARE

## 2024-05-02 PROCEDURE — 77014 PR CT GUIDE FOR PLACEMENT RADIATION THERAPY FIELDS: CPT | Mod: 26 | Performed by: STUDENT IN AN ORGANIZED HEALTH CARE EDUCATION/TRAINING PROGRAM

## 2024-05-02 PROCEDURE — 77427 RADIATION TX MANAGEMENT X5: CPT | Performed by: RADIOLOGY

## 2024-05-02 PROCEDURE — 77336 RADIATION PHYSICS CONSULT: CPT | Performed by: RADIOLOGY

## 2024-05-02 PROCEDURE — 77386 HC IMRT TREATMENT DELIVERY, COMPLEX: CPT | Performed by: STUDENT IN AN ORGANIZED HEALTH CARE EDUCATION/TRAINING PROGRAM

## 2024-05-03 ENCOUNTER — TELEPHONE (OUTPATIENT)
Dept: PALLIATIVE CARE | Facility: CLINIC | Age: 74
End: 2024-05-03

## 2024-05-06 ENCOUNTER — TELEPHONE (OUTPATIENT)
Dept: RADIATION ONCOLOGY | Facility: CLINIC | Age: 74
End: 2024-05-06
Payer: MEDICARE

## 2024-05-06 ENCOUNTER — ONCOLOGY VISIT (OUTPATIENT)
Dept: ONCOLOGY | Facility: HOSPITAL | Age: 74
End: 2024-05-06
Attending: INTERNAL MEDICINE
Payer: MEDICARE

## 2024-05-06 ENCOUNTER — APPOINTMENT (OUTPATIENT)
Dept: RADIATION ONCOLOGY | Facility: CLINIC | Age: 74
End: 2024-05-06
Attending: RADIOLOGY
Payer: MEDICARE

## 2024-05-06 VITALS
OXYGEN SATURATION: 97 % | SYSTOLIC BLOOD PRESSURE: 167 MMHG | HEIGHT: 70 IN | HEART RATE: 77 BPM | BODY MASS INDEX: 24.62 KG/M2 | DIASTOLIC BLOOD PRESSURE: 86 MMHG | WEIGHT: 172 LBS | RESPIRATION RATE: 16 BRPM

## 2024-05-06 DIAGNOSIS — G89.3 CANCER RELATED PAIN: ICD-10-CM

## 2024-05-06 DIAGNOSIS — E87.6 HYPOKALEMIA: ICD-10-CM

## 2024-05-06 DIAGNOSIS — K12.31 MUCOSITIS DUE TO CHEMOTHERAPY: ICD-10-CM

## 2024-05-06 DIAGNOSIS — C01 PRIMARY SQUAMOUS CELL CARCINOMA OF BASE OF TONGUE (H): Primary | ICD-10-CM

## 2024-05-06 PROCEDURE — 99215 OFFICE O/P EST HI 40 MIN: CPT | Performed by: INTERNAL MEDICINE

## 2024-05-06 PROCEDURE — G0463 HOSPITAL OUTPT CLINIC VISIT: HCPCS | Performed by: INTERNAL MEDICINE

## 2024-05-06 PROCEDURE — 77386 HC IMRT TREATMENT DELIVERY, COMPLEX: CPT | Performed by: RADIOLOGY

## 2024-05-06 PROCEDURE — G2211 COMPLEX E/M VISIT ADD ON: HCPCS | Performed by: INTERNAL MEDICINE

## 2024-05-06 PROCEDURE — 77014 PR CT GUIDE FOR PLACEMENT RADIATION THERAPY FIELDS: CPT | Mod: 26 | Performed by: RADIOLOGY

## 2024-05-06 RX ORDER — LIDOCAINE HYDROCHLORIDE 20 MG/ML
15 SOLUTION OROPHARYNGEAL
Qty: 200 ML | Refills: 1 | Status: SHIPPED | OUTPATIENT
Start: 2024-05-06 | End: 2024-05-22

## 2024-05-06 RX ORDER — MORPHINE SULFATE 10 MG/5ML
10-20 SOLUTION ORAL EVERY 4 HOURS PRN
Qty: 250 ML | Refills: 0 | Status: SHIPPED | OUTPATIENT
Start: 2024-05-06 | End: 2024-05-11

## 2024-05-06 ASSESSMENT — PAIN SCALES - GENERAL: PAINLEVEL: MODERATE PAIN (4)

## 2024-05-06 NOTE — TELEPHONE ENCOUNTER
Called pt. To find out if he was planning to come for treatment today due to being 20 minutes late and we had not received a phone call.

## 2024-05-06 NOTE — PROGRESS NOTES
"Oncology Rooming Note    May 6, 2024 1:05 PM   Donovan Yi is a 74 year old male who presents for:    Chief Complaint   Patient presents with    Oncology Clinic Visit       Primary squamous cell carcinoma of base of tongue       Initial Vitals: BP (!) 167/86   Pulse 77   Resp 16   Ht 1.765 m (5' 9.5\")   Wt 78 kg (172 lb)   SpO2 97%   BMI 25.04 kg/m   Estimated body mass index is 25.04 kg/m  as calculated from the following:    Height as of this encounter: 1.765 m (5' 9.5\").    Weight as of this encounter: 78 kg (172 lb). Body surface area is 1.96 meters squared.  Moderate Pain (4) Comment: Data Unavailable   No LMP for male patient.  Allergies reviewed: Yes  Medications reviewed: Yes    Medications: MEDICATION REFILLS NEEDED TODAY. Provider was notified.  Pharmacy name entered into [employer]: [facility] #[id number] [id number] [facility] [address]    Frailty Screening:   Is the patient here for a new oncology consult visit in cancer care? 2. No      Clinical concerns:  after radiation, chemo later this week      Noelle Stahl              "

## 2024-05-06 NOTE — LETTER
5/6/2024         RE: Donovan Yi  [address] [place] [address]        Dear Colleague,    Thank you for referring your patient, Donovan Yi, to the [facility] CANCER [facility]. Please see a copy of my visit note below.    [facility] Hematology and Oncology Progress Note    Patient: Donovan Yi  MRN: [id number]  Date of Service: May 6, 2024        Assessment and Plan:    1.  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  He is having significant difficulties with local side effects on the posterior oropharynx.  Lots of discomfort and difficulty swallowing.  He is questioning if he is going to complete radiation.  He will receive his sixth and final dose of chemotherapy this week.  Seems to be tolerating this okay.  Minimal nausea with no neuropathy.  I reviewed with them that typically I like to get a CT scan a few weeks after radiation is complete.  We then proceeded with a PET scan about 3 months afterwards.  He still has just over 2 weeks of radiation left.    2.  Right upper lobe groundglass nodule: 4.2 cm.  Minimal FDG uptake. Follow radiographically.  If evidence of enlargement may need to biopsy and treated with radiation.  Unlikely to be related to the tongue cancer.    3.  F/E/N: He does not have a feeding tube in place.  He is eating mostly smoothies.  I reviewed his labs from May 1.  He has a slightly low magnesium at 1.5 and hypokalemia with a potassium of 3.0.  These can be replaced when he comes in for treatment.  Total protein and albumin were normal.    4.  Possible BPH: He has nocturia 4 times a night.  He does endorse that sometimes it feels like he does not empty his bladder completely.  He is on Flomax.  We could add add finasteride 5 mg once daily as a next step.    5.  Oropharyngeal discomfort/mucositis: He does have some tongue and mucosal ulcerations.  He has been taking Magic  mouthwash.  I am going to add viscous lidocaine.  I am also going to add Roxanol.  He is taking gabapentin for his back.  He also started taking buprenorphine which she has had for his back pain.  I told him that he should stop these unless he finds them helpful for his chronic low back pain.    6.  Prophylaxis: Asked him to keep an eye on his bowel habits and if he notices frequency of stooling decreasing or if he has constipation we will talk about stool softeners and laxatives.    Medical decision Making:  I spent 41 minutes in the care of this patient today, which included time necessary for preparation for the visit, face to face time with the patient, communication of recommendations to the care team, and documentation time.  Today's visit was centered around a cancer diagnosis in the setting of additional medical comorbidities. Complex medical decision making was required. The risk of additional morbidity without further treatment is high.     ECOG Performance  0    Diagnosis:    Base of tongue cancer:  Diagnosed February, 2024.  Left posterior tongue.  Biopsy showed p16 positive squamous cell carcinoma.  PET scan showed a hypermetabolic mass at the left tongue base which extends inferiorly into the vallecula.  Measures 2.7 x 2.6 x 4.4 cm.  No evidence of latricia or metastatic     Treatment:    Concurrent radiation and chemotherapy started on April 3, 2024.  Weekly cisplatin.    Interim History:    Phani returns today for follow-up visit.  He has about 2-1/2 weeks left of his radiation.  Having significant local problems with pain and irritation.  Also having more fatigue.  He has an uncomfortable ulcer on the lateral right tongue from friction against tooth.  He has small sores.  Denies nausea or vomiting or peripheral neuropathy symptoms.    Review of Systems:    As above in the history.     Review of Systems otherwise Negative for:  General: chills, fever or night sweats  Psychological: anxiety or  depression  Ophthalmic: blurry vision, double vision or loss of vision, vision change  ENT: epistaxis, hearing changes  Hematological and Lymphatic: bleeding, bruising, jaundice, swollen lymph nodes  Endocrine: hot flashes, unexpected weight changes  Respiratory: cough, hemoptysis, orthopnea or shortness of breath/FLETCHER  Cardiovascular: chest pain, edema, palpitations or PND  Gastrointestinal: abdominal pain, blood in stools, change in bowel habits, constipation  Genito-Urinary: change in urinary stream, incontinence, frequency/urgency  Musculoskeletal: joint pain, stiffness, swelling, muscle pain  Neurological: dizziness, headaches, numbness/tingling  Dermatological: lumps and rash    Past History:    Past Medical History:   Diagnosis Date     Food intolerance in adult      Hypertension      Malignant neoplasm of base of tongue (H)      Physical Exam:    There were no vitals taken for this visit.    General: patient appears stated age of 73 year old. Nontoxic and in no distress.   HEENT: Head: atraumatic, normocephalic. Sclerae anicteric.  Ulcerations on the lateral right tongue and left buccal mucosa.  Posterior oropharynx is erythematous mucous membranes are dry.  Chest:  Normal respiratory effort  Cardiac:  No edema.   Abdomen: abdomen is non-distended  Extremities: normal tone and muscle bulk.  Skin: no lesions or rash on visible skin. Warm and dry.   CNS: alert and oriented. Grossly non-focal.   Psychiatric: normal mood and affect.     Lab Results:    Recent Results (from the past 168 hour(s))   Comprehensive metabolic panel   Result Value Ref Range    Sodium 141 135 - 145 mmol/L    Potassium 3.4 3.4 - 5.3 mmol/L    Carbon Dioxide (CO2) 27 22 - 29 mmol/L    Anion Gap 10 7 - 15 mmol/L    Urea Nitrogen 20.6 8.0 - 23.0 mg/dL    Creatinine 0.76 0.67 - 1.17 mg/dL    GFR Estimate >90 >60 mL/min/1.73m2    Calcium 8.8 8.8 - 10.2 mg/dL    Chloride 104 98 - 107 mmol/L    Glucose 129 (H) 70 - 99 mg/dL    Alkaline  "Phosphatase 67 40 - 150 U/L    AST 18 0 - 45 U/L    ALT 15 0 - 70 U/L    Protein Total 6.8 6.4 - 8.3 g/dL    Albumin 3.9 3.5 - 5.2 g/dL    Bilirubin Total 0.4 <=1.2 mg/dL   Magnesium   Result Value Ref Range    Magnesium 1.9 1.7 - 2.3 mg/dL   CBC with platelets and differential   Result Value Ref Range    WBC Count 6.1 4.0 - 11.0 10e3/uL    RBC Count 4.20 (L) 4.40 - 5.90 10e6/uL    Hemoglobin 12.9 (L) 13.3 - 17.7 g/dL    Hematocrit 37.6 (L) 40.0 - 53.0 %    MCV 90 78 - 100 fL    MCH 30.7 26.5 - 33.0 pg    MCHC 34.3 31.5 - 36.5 g/dL    RDW 13.5 10.0 - 15.0 %    Platelet Count 205 150 - 450 10e3/uL    % Neutrophils 80 %    % Lymphocytes 10 %    % Monocytes 7 %    % Eosinophils 2 %    % Basophils 1 %    % Immature Granulocytes 1 %    NRBCs per 100 WBC 0 <1 /100    Absolute Neutrophils 4.9 1.6 - 8.3 10e3/uL    Absolute Lymphocytes 0.6 (L) 0.8 - 5.3 10e3/uL    Absolute Monocytes 0.4 0.0 - 1.3 10e3/uL    Absolute Eosinophils 0.1 0.0 - 0.7 10e3/uL    Absolute Basophils 0.0 0.0 - 0.2 10e3/uL    Absolute Immature Granulocytes 0.0 <=0.4 10e3/uL    Absolute NRBCs 0.0 10e3/uL     Imaging:    No results found.    Signed by: Michael Aldana MD      Oncology Rooming Note    May 6, 2024 1:05 PM   Donovan Yi is a 74 year old male who presents for:    Chief Complaint   Patient presents with     Oncology Clinic Visit       Primary squamous cell carcinoma of base of tongue       Initial Vitals: BP (!) 167/86   Pulse 77   Resp 16   Ht 1.765 m (5' 9.5\")   Wt 78 kg (172 lb)   SpO2 97%   BMI 25.04 kg/m   Estimated body mass index is 25.04 kg/m  as calculated from the following:    Height as of this encounter: 1.765 m (5' 9.5\").    Weight as of this encounter: 78 kg (172 lb). Body surface area is 1.96 meters squared.  Moderate Pain (4) Comment: Data Unavailable   No LMP for male patient.  Allergies reviewed: Yes  Medications reviewed: Yes    Medications: MEDICATION REFILLS NEEDED TODAY. Provider was notified.  Pharmacy name " entered into [facility]: [id number] - [place], [place] - [address] [facility] [address]    Frailty Screening:   Is the patient here for a new oncology consult visit in cancer care? 2. No      Clinical concerns:  after radiation, chemo later this week      Noelle Stahl                Again, thank you for allowing me to participate in the care of your patient.        Sincerely,        Michael Aldana MD

## 2024-05-06 NOTE — TELEPHONE ENCOUNTER
Prior Authorization Approval    Medication: BELBUCA 150 MCG BU FILM  Authorization Effective Date: 1/1/2024  Authorization Expiration Date: 5/6/2025  Approved Dose/Quantity:   Reference #:     Insurance Company: [employer] - Phone [phone] Fax [phone]  Expected CoPay: $    CoPay Card Available:      Financial Assistance Needed:   Which Pharmacy is filling the prescription: [facility] #[id number] [place], [place] - [facility] [address] [facility]  Pharmacy Notified: Yes  Patient Notified:

## 2024-05-06 NOTE — TELEPHONE ENCOUNTER
Retail Pharmacy Prior Authorization Team   Phone: [phone]    PA Initiation    Medication: BELBUCA 150 MCG BU [employer]  Insurance Company: [employer] - Phone [phone] Fax [phone]  Pharmacy Filling the Rx: [id number] [place], [place] - [address]  Filling Pharmacy Phone: [phone]  Filling Pharmacy Fax: [phone]  Start Date: 5/3/2024

## 2024-05-07 ENCOUNTER — APPOINTMENT (OUTPATIENT)
Dept: RADIATION ONCOLOGY | Facility: CLINIC | Age: 74
End: 2024-05-07
Attending: RADIOLOGY
Payer: MEDICARE

## 2024-05-07 PROCEDURE — 77014 PR CT GUIDE FOR PLACEMENT RADIATION THERAPY FIELDS: CPT | Mod: 26 | Performed by: RADIOLOGY

## 2024-05-07 PROCEDURE — 77386 HC IMRT TREATMENT DELIVERY, COMPLEX: CPT | Performed by: RADIOLOGY

## 2024-05-07 RX ORDER — POTASSIUM CHLORIDE 29.8 MG/ML
20 INJECTION INTRAVENOUS ONCE
Status: CANCELLED
Start: 2024-05-08

## 2024-05-07 RX ORDER — MAGNESIUM SULFATE HEPTAHYDRATE 40 MG/ML
2 INJECTION, SOLUTION INTRAVENOUS ONCE
Status: CANCELLED
Start: 2024-05-08 | End: 2024-05-08

## 2024-05-08 ENCOUNTER — INFUSION THERAPY VISIT (OUTPATIENT)
Dept: INFUSION THERAPY | Facility: HOSPITAL | Age: 74
End: 2024-05-08
Attending: INTERNAL MEDICINE
Payer: MEDICARE

## 2024-05-08 ENCOUNTER — OFFICE VISIT (OUTPATIENT)
Dept: RADIATION ONCOLOGY | Facility: CLINIC | Age: 74
End: 2024-05-08
Attending: RADIOLOGY
Payer: MEDICARE

## 2024-05-08 VITALS
RESPIRATION RATE: 18 BRPM | DIASTOLIC BLOOD PRESSURE: 83 MMHG | HEART RATE: 72 BPM | OXYGEN SATURATION: 97 % | TEMPERATURE: 98 F | SYSTOLIC BLOOD PRESSURE: 172 MMHG

## 2024-05-08 VITALS
SYSTOLIC BLOOD PRESSURE: 172 MMHG | HEART RATE: 72 BPM | RESPIRATION RATE: 18 BRPM | OXYGEN SATURATION: 97 % | TEMPERATURE: 98 F | DIASTOLIC BLOOD PRESSURE: 83 MMHG

## 2024-05-08 DIAGNOSIS — C01 PRIMARY SQUAMOUS CELL CARCINOMA OF BASE OF TONGUE (H): Primary | ICD-10-CM

## 2024-05-08 DIAGNOSIS — C01 MALIGNANT NEOPLASM OF BASE OF TONGUE (H): Primary | ICD-10-CM

## 2024-05-08 LAB
ALBUMIN SERPL BCG-MCNC: 4.4 G/DL (ref 3.5–5.2)
ALP SERPL-CCNC: 75 U/L (ref 40–150)
ALT SERPL W P-5'-P-CCNC: 32 U/L (ref 0–70)
ANION GAP SERPL CALCULATED.3IONS-SCNC: 10 MMOL/L (ref 7–15)
AST SERPL W P-5'-P-CCNC: 26 U/L (ref 0–45)
BASOPHILS # BLD AUTO: 0 10E3/UL (ref 0–0.2)
BASOPHILS NFR BLD AUTO: 1 %
BILIRUB SERPL-MCNC: 0.6 MG/DL
BUN SERPL-MCNC: 16.3 MG/DL (ref 8–23)
CALCIUM SERPL-MCNC: 9.2 MG/DL (ref 8.8–10.2)
CHLORIDE SERPL-SCNC: 100 MMOL/L (ref 98–107)
CREAT SERPL-MCNC: 0.89 MG/DL (ref 0.67–1.17)
DEPRECATED HCO3 PLAS-SCNC: 31 MMOL/L (ref 22–29)
EGFRCR SERPLBLD CKD-EPI 2021: 89 ML/MIN/1.73M2
EOSINOPHIL # BLD AUTO: 0 10E3/UL (ref 0–0.7)
EOSINOPHIL NFR BLD AUTO: 2 %
ERYTHROCYTE [DISTWIDTH] IN BLOOD BY AUTOMATED COUNT: 15.3 % (ref 10–15)
GLUCOSE SERPL-MCNC: 112 MG/DL (ref 70–99)
HCT VFR BLD AUTO: 35.9 % (ref 40–53)
HGB BLD-MCNC: 12.4 G/DL (ref 13.3–17.7)
IMM GRANULOCYTES # BLD: 0 10E3/UL
IMM GRANULOCYTES NFR BLD: 1 %
LYMPHOCYTES # BLD AUTO: 0.4 10E3/UL (ref 0.8–5.3)
LYMPHOCYTES NFR BLD AUTO: 19 %
MAGNESIUM SERPL-MCNC: 1.5 MG/DL (ref 1.7–2.3)
MCH RBC QN AUTO: 30.8 PG (ref 26.5–33)
MCHC RBC AUTO-ENTMCNC: 34.5 G/DL (ref 31.5–36.5)
MCV RBC AUTO: 89 FL (ref 78–100)
MONOCYTES # BLD AUTO: 0.3 10E3/UL (ref 0–1.3)
MONOCYTES NFR BLD AUTO: 15 %
NEUTROPHILS # BLD AUTO: 1.3 10E3/UL (ref 1.6–8.3)
NEUTROPHILS NFR BLD AUTO: 63 %
NRBC # BLD AUTO: 0 10E3/UL
NRBC BLD AUTO-RTO: 0 /100
PLATELET # BLD AUTO: 148 10E3/UL (ref 150–450)
POTASSIUM SERPL-SCNC: 3 MMOL/L (ref 3.4–5.3)
PROT SERPL-MCNC: 7.3 G/DL (ref 6.4–8.3)
RBC # BLD AUTO: 4.03 10E6/UL (ref 4.4–5.9)
SODIUM SERPL-SCNC: 141 MMOL/L (ref 135–145)
WBC # BLD AUTO: 2 10E3/UL (ref 4–11)

## 2024-05-08 PROCEDURE — 96367 TX/PROPH/DG ADDL SEQ IV INF: CPT

## 2024-05-08 PROCEDURE — 36415 COLL VENOUS BLD VENIPUNCTURE: CPT | Performed by: INTERNAL MEDICINE

## 2024-05-08 PROCEDURE — 258N000003 HC RX IP 258 OP 636: Performed by: INTERNAL MEDICINE

## 2024-05-08 PROCEDURE — 77386 HC IMRT TREATMENT DELIVERY, COMPLEX: CPT | Performed by: RADIOLOGY

## 2024-05-08 PROCEDURE — 83735 ASSAY OF MAGNESIUM: CPT | Performed by: INTERNAL MEDICINE

## 2024-05-08 PROCEDURE — 96368 THER/DIAG CONCURRENT INF: CPT

## 2024-05-08 PROCEDURE — 96413 CHEMO IV INFUSION 1 HR: CPT

## 2024-05-08 PROCEDURE — 250N000011 HC RX IP 250 OP 636: Performed by: INTERNAL MEDICINE

## 2024-05-08 PROCEDURE — 77014 PR CT GUIDE FOR PLACEMENT RADIATION THERAPY FIELDS: CPT | Mod: 26 | Performed by: RADIOLOGY

## 2024-05-08 PROCEDURE — 96375 TX/PRO/DX INJ NEW DRUG ADDON: CPT

## 2024-05-08 PROCEDURE — 80053 COMPREHEN METABOLIC PANEL: CPT | Performed by: INTERNAL MEDICINE

## 2024-05-08 PROCEDURE — 85025 COMPLETE CBC W/AUTO DIFF WBC: CPT | Performed by: INTERNAL MEDICINE

## 2024-05-08 RX ORDER — MAGNESIUM SULFATE HEPTAHYDRATE 40 MG/ML
2 INJECTION, SOLUTION INTRAVENOUS ONCE
Qty: 50 ML | Refills: 0 | Status: COMPLETED | OUTPATIENT
Start: 2024-05-08 | End: 2024-05-08

## 2024-05-08 RX ORDER — PALONOSETRON 0.05 MG/ML
0.25 INJECTION, SOLUTION INTRAVENOUS ONCE
Qty: 5 ML | Refills: 0 | Status: COMPLETED | OUTPATIENT
Start: 2024-05-08 | End: 2024-05-08

## 2024-05-08 RX ORDER — HEPARIN SODIUM (PORCINE) LOCK FLUSH IV SOLN 100 UNIT/ML 100 UNIT/ML
5 SOLUTION INTRAVENOUS
Status: DISCONTINUED | OUTPATIENT
Start: 2024-05-08 | End: 2024-05-08 | Stop reason: HOSPADM

## 2024-05-08 RX ORDER — METHYLPREDNISOLONE SODIUM SUCCINATE 125 MG/2ML
125 INJECTION, POWDER, LYOPHILIZED, FOR SOLUTION INTRAMUSCULAR; INTRAVENOUS
Status: DISCONTINUED | OUTPATIENT
Start: 2024-05-08 | End: 2024-05-08 | Stop reason: HOSPADM

## 2024-05-08 RX ORDER — DIPHENHYDRAMINE HYDROCHLORIDE 50 MG/ML
50 INJECTION INTRAMUSCULAR; INTRAVENOUS
Status: DISCONTINUED | OUTPATIENT
Start: 2024-05-08 | End: 2024-05-08 | Stop reason: HOSPADM

## 2024-05-08 RX ORDER — ALBUTEROL SULFATE 90 UG/1
1-2 AEROSOL, METERED RESPIRATORY (INHALATION)
Status: DISCONTINUED | OUTPATIENT
Start: 2024-05-08 | End: 2024-05-08 | Stop reason: HOSPADM

## 2024-05-08 RX ORDER — ALBUTEROL SULFATE 0.83 MG/ML
2.5 SOLUTION RESPIRATORY (INHALATION)
Status: DISCONTINUED | OUTPATIENT
Start: 2024-05-08 | End: 2024-05-08 | Stop reason: HOSPADM

## 2024-05-08 RX ORDER — MEPERIDINE HYDROCHLORIDE 50 MG/ML
25 INJECTION INTRAMUSCULAR; INTRAVENOUS; SUBCUTANEOUS EVERY 30 MIN PRN
Status: DISCONTINUED | OUTPATIENT
Start: 2024-05-08 | End: 2024-05-08 | Stop reason: HOSPADM

## 2024-05-08 RX ORDER — EPINEPHRINE 1 MG/ML
0.3 INJECTION, SOLUTION INTRAMUSCULAR; SUBCUTANEOUS EVERY 5 MIN PRN
Status: DISCONTINUED | OUTPATIENT
Start: 2024-05-08 | End: 2024-05-08 | Stop reason: HOSPADM

## 2024-05-08 RX ORDER — POTASSIUM CHLORIDE 29.8 MG/ML
20 INJECTION INTRAVENOUS ONCE
Qty: 50 ML | Refills: 0 | Status: COMPLETED | OUTPATIENT
Start: 2024-05-08 | End: 2024-05-08

## 2024-05-08 RX ADMIN — SODIUM CHLORIDE 1000 ML: 9 INJECTION, SOLUTION INTRAVENOUS at 10:19

## 2024-05-08 RX ADMIN — MAGNESIUM SULFATE HEPTAHYDRATE 2 G: 40 INJECTION, SOLUTION INTRAVENOUS at 13:10

## 2024-05-08 RX ADMIN — POTASSIUM CHLORIDE 20 MEQ: 29.8 INJECTION, SOLUTION INTRAVENOUS at 13:05

## 2024-05-08 RX ADMIN — FOSAPREPITANT: 150 INJECTION, POWDER, LYOPHILIZED, FOR SOLUTION INTRAVENOUS at 10:54

## 2024-05-08 RX ADMIN — SODIUM CHLORIDE 81 MG: 9 INJECTION, SOLUTION INTRAVENOUS at 11:54

## 2024-05-08 RX ADMIN — PALONOSETRON 0.25 MG: 0.05 INJECTION, SOLUTION INTRAVENOUS at 10:42

## 2024-05-08 ASSESSMENT — PAIN SCALES - GENERAL: PAINLEVEL: MODERATE PAIN (4)

## 2024-05-08 NOTE — PROGRESS NOTES
RADIATION ONCOLOGY WEEKLY TREATMENT VISIT NOTE      Assessment / Impression       Visit Dx:  (C01) Malignant neoplasm of base of tongue (H)  (primary encounter diagnosis)    Tolerating radiation therapy well.  All questions and concerns addressed.    Plan:     Continue radiation treatment as prescribed.    His pain is manageable with pain medication.     Discussed with patient about appropriate nutritional support during the therapy.     We will continue monitor patient closely.    The patient wants to change his radiation therapy to 4 days a week to reduce the side effects.  He is informed that this will reduce likelihood of cancer control.  He understands well and wished to proceed.    Subjective:      HPI: Donovan Yi is a 74 year old male with  Malignant neoplasm of base of tongue (H) [C01]    The following portions of the patient's history were reviewed and updated as appropriate: allergies, current medications, past family history, past medical history, past social history, past surgical history and problem list.    Assessment                  Body Site:  Head and Neck Site: BOT  Stereotactic Radiosurgery: No  Concurrent Therapy: Yes (weekly cisplatin)  Today's Dose: 4600  Total Dose for Head and Neck: 7000  Today's Fraction/Total Fraction Head and Neck: 23/35  Mucositis due to radiation: 1: Erythema of the mucosa  Thrush: 0: Absent  Pharynx and Esphogaus: 2: Tolerates soft diet  Voice Chances/Stridor/Larynx: 0: Normal  Ocular/Visual - other : 0: Normal  Middle ear/hearin: Normal  Tinnitus: 0: No  Cough: 0: Absent                                   Sexuality Alteration                    Emotional Alteration    Copin: Ineffective  Comfort Alteration   KPS: 90% Can perform normal activity, minor signs of disease  Fatigue (ONS scale): 0: No Fatigue  Pain Location: mouth/throat  Pain Intensity. Rate degree of pain ranging from 0 (no pain) to 10 (severe pain): 4  Pain Description: Ache  - Muscular type ache  Pain Intervention: 3: Opiods (hydrocodone, gabapentin, belbuca, magic mouthwash)  Effectiveness of pain intervention: 2: Pain relieved 50%   Nutrition Alteration   Anorexia: 1: Loss of appetite  Nausea: 0: None  Vomitin: None  Pharynx and Esphogaus: 2: Tolerates soft diet  Skin Alteration   Skin Sensation: 0: No problem  Skin Reaction: 2: Bright erythema  AUA Assessment                                           Accompanied by       Objective:     Exam: mild, moderate Erythema.    Vitals:    24 1443   BP: (!) 172/83   Pulse: 72   Resp: 18   Temp: 98  F (36.7  C)   SpO2: 97%   PainSc: Moderate Pain (4)   PainLoc: Throat       Wt Readings from Last 8 Encounters:   24 78 kg (172 lb)   24 81.7 kg (180 lb 3.2 oz)   24 80.3 kg (177 lb)   24 80.7 kg (177 lb 14.4 oz)   24 81.5 kg (179 lb 11.2 oz)   24 82.2 kg (181 lb 4.8 oz)   24 81.6 kg (180 lb)   04/10/24 82.3 kg (181 lb 6.4 oz)       General: Alert and oriented, in no acute distress  Christopher has mild, moderate Erythema.  Aria chart and setup information reviewed    Joyce Yang MD

## 2024-05-08 NOTE — PROGRESS NOTES
Infusion Nursing Note:  Donovan Yi presents today for D36C1.    Patient seen by provider today: No   present during visit today: Not Applicable.    Note: Tolerated treatment well, offers no complaints.  Discharge to home with wife.Reviewed discharge teaching and med information.      Intravenous Access:  Peripheral IV placed.    Treatment Conditions:  Results reviewed, labs MET treatment parameters, ok to proceed with treatment.      Post Infusion Assessment:  Patient tolerated infusion without incident.  Site patent and intact, free from redness, edema or discomfort.  No evidence of extravasations.  Access discontinued per protocol.       Discharge Plan:   Patient and/or family verbalized understanding of discharge instructions and all questions answered.      Radha Matson RN

## 2024-05-08 NOTE — LETTER
2024         RE: Donovan Yi  [address] [address]        Dear Colleague,    Thank you for referring your patient, Donovan Yi, to the [facility] RADIATION ONCOLOGY [place]. Please see a copy of my visit note below.    RADIATION ONCOLOGY WEEKLY TREATMENT VISIT NOTE      Assessment / Impression       Visit Dx:  (C01) Malignant neoplasm of base of tongue (H)  (primary encounter diagnosis)    Tolerating radiation therapy well.  All questions and concerns addressed.    Plan:     Continue radiation treatment as prescribed.    His pain is manageable with pain medication.     Discussed with patient about appropriate nutritional support during the therapy.     We will continue monitor patient closely.    The patient wants to change his radiation therapy to 4 days a week to reduce the side effects.  He is informed that this will reduce likelihood of cancer control.  He understands well and wished to proceed.    Subjective:      HPI: Donovan Yi is a 74 year old male with  Malignant neoplasm of base of tongue (H) [C01]    The following portions of the patient's history were reviewed and updated as appropriate: allergies, current medications, past family history, past medical history, past social history, past surgical history and problem list.    Assessment                  Body Site:  Head and Neck Site: BOT  Stereotactic Radiosurgery: No  Concurrent Therapy: Yes (weekly cisplatin)  Today's Dose: 4600  Total Dose for Head and Neck: 7000  Today's Fraction/Total Fraction Head and Neck: 23/35  Mucositis due to radiation: 1: Erythema of the mucosa  Thrush: 0: Absent  Pharynx and Esphogaus: 2: Tolerates soft diet  Voice Chances/Stridor/Larynx: 0: Normal  Ocular/Visual - other : 0: Normal  Middle ear/hearin: Normal  Tinnitus: 0: No  Cough: 0: Absent                                   Sexuality Alteration                    Emotional Alteration     Copin: Ineffective  Comfort Alteration   KPS: 90% Can perform normal activity, minor signs of disease  Fatigue (ONS scale): 0: No Fatigue  Pain Location: mouth/throat  Pain Intensity. Rate degree of pain ranging from 0 (no pain) to 10 (severe pain): 4  Pain Description: Ache - Muscular type ache  Pain Intervention: 3: Opiods (hydrocodone, gabapentin, belbuca, magic mouthwash)  Effectiveness of pain intervention: 2: Pain relieved 50%   Nutrition Alteration   Anorexia: 1: Loss of appetite  Nausea: 0: None  Vomitin: None  Pharynx and Esphogaus: 2: Tolerates soft diet  Skin Alteration   Skin Sensation: 0: No problem  Skin Reaction: 2: Bright erythema  AUA Assessment                                           Accompanied by       Objective:     Exam: mild, moderate Erythema.    Vitals:    24 1443   BP: (!) 172/83   Pulse: 72   Resp: 18   Temp: 98  F (36.7  C)   SpO2: 97%   PainSc: Moderate Pain (4)   PainLoc: Throat       Wt Readings from Last 8 Encounters:   24 78 kg (172 lb)   24 81.7 kg (180 lb 3.2 oz)   24 80.3 kg (177 lb)   24 80.7 kg (177 lb 14.4 oz)   24 81.5 kg (179 lb 11.2 oz)   24 82.2 kg (181 lb 4.8 oz)   24 81.6 kg (180 lb)   04/10/24 82.3 kg (181 lb 6.4 oz)       General: Alert and oriented, in no acute distress  Christopher has mild, moderate Erythema.  Aria chart and setup information reviewed    Joyce Yang MD      Again, thank you for allowing me to participate in the care of your patient.        Sincerely,        Joyce Yang MD

## 2024-05-09 ENCOUNTER — APPOINTMENT (OUTPATIENT)
Dept: RADIATION ONCOLOGY | Facility: CLINIC | Age: 74
End: 2024-05-09
Attending: RADIOLOGY
Payer: MEDICARE

## 2024-05-09 ENCOUNTER — OFFICE VISIT (OUTPATIENT)
Dept: FAMILY MEDICINE | Facility: CLINIC | Age: 74
End: 2024-05-09
Payer: MEDICARE

## 2024-05-09 VITALS
WEIGHT: 175.25 LBS | SYSTOLIC BLOOD PRESSURE: 137 MMHG | OXYGEN SATURATION: 99 % | HEIGHT: 70 IN | RESPIRATION RATE: 12 BRPM | HEART RATE: 73 BPM | DIASTOLIC BLOOD PRESSURE: 62 MMHG | TEMPERATURE: 98 F | BODY MASS INDEX: 25.09 KG/M2

## 2024-05-09 DIAGNOSIS — I10 ESSENTIAL HYPERTENSION: Primary | ICD-10-CM

## 2024-05-09 DIAGNOSIS — C01 PRIMARY SQUAMOUS CELL CARCINOMA OF BASE OF TONGUE (H): ICD-10-CM

## 2024-05-09 PROCEDURE — 99214 OFFICE O/P EST MOD 30 MIN: CPT

## 2024-05-09 PROCEDURE — 77014 PR CT GUIDE FOR PLACEMENT RADIATION THERAPY FIELDS: CPT | Mod: 26 | Performed by: STUDENT IN AN ORGANIZED HEALTH CARE EDUCATION/TRAINING PROGRAM

## 2024-05-09 PROCEDURE — 77386 HC IMRT TREATMENT DELIVERY, COMPLEX: CPT | Performed by: STUDENT IN AN ORGANIZED HEALTH CARE EDUCATION/TRAINING PROGRAM

## 2024-05-09 RX ORDER — RESPIRATORY SYNCYTIAL VIRUS VACCINE 120MCG/0.5
0.5 KIT INTRAMUSCULAR ONCE
Qty: 1 EACH | Refills: 0 | Status: CANCELLED | OUTPATIENT
Start: 2024-05-09 | End: 2024-05-09

## 2024-05-09 RX ORDER — LOSARTAN POTASSIUM 50 MG/1
50 TABLET ORAL DAILY
Qty: 90 TABLET | Refills: 0 | Status: SHIPPED | OUTPATIENT
Start: 2024-05-09 | End: 2024-07-26 | Stop reason: SINTOL

## 2024-05-09 NOTE — ASSESSMENT & PLAN NOTE
Patient presents today to discuss hypertension.  He has a history of benign essential hypertension dating back multiple years.  Historically, has always been well-controlled on lisinopril 20 mg.  However, beginning last year he began experiencing persistent elevations back to clinic visits ranging anywhere from 150s-180s SBP.  He remains asymptomatic with these elevations.  At one point, he was placed on amlodipine 10 mg daily however he notes that the with this addition, he began experiencing significant dizziness and drop symptoms blood pressure therefore he has stopped this.  Has not been on any other medications.  His blood pressure today in clinic is actually borderline elevated initially, and returned to goal range on recheck.  Given how persistent his elevations have been however I believe a alternate therapy is reasonable. Will switch to losartan 50mg daily.  Did consider the addition of hydrochlorothiazide, however he has had persistent hypokalemia with his recent radiation and therefore would like to avoid any further electrolyte abnormalities.  Will have him return to clinic in approximately 1 month for his annual visit and medication check.  In the interim, he will have blood pressures monitored through oncology and radiology.  Patient and wife expressed understanding of and agreement with this.  All questions were answered.

## 2024-05-09 NOTE — ASSESSMENT & PLAN NOTE
Specialty notes reviewed today.  This is a rather recent diagnosis, with the mass being discovered in February 2024.  Patient is currently in the midst of radiation treatment and reports he has approximately 3 weeks left of this.

## 2024-05-09 NOTE — PROGRESS NOTES
Assessment & Plan   Problem List Items Addressed This Visit       Essential hypertension - Primary     Patient presents today to discuss hypertension.  He has a history of benign essential hypertension dating back multiple years.  Historically, has always been well-controlled on lisinopril 20 mg.  However, beginning last year he began experiencing persistent elevations back to clinic visits ranging anywhere from 150s-180s SBP.  He remains asymptomatic with these elevations.  At one point, he was placed on amlodipine 10 mg daily however he notes that the with this addition, he began experiencing significant dizziness and drop symptoms blood pressure therefore he has stopped this.  Has not been on any other medications.  His blood pressure today in clinic is actually borderline elevated initially, and returned to goal range on recheck.  Given how persistent his elevations have been however I believe a alternate therapy is reasonable. Will switch to losartan 50mg daily.  Did consider the addition of hydrochlorothiazide, however he has had persistent hypokalemia with his recent radiation and therefore would like to avoid any further electrolyte abnormalities.  Will have him return to clinic in approximately 1 month for his annual visit and medication check.  In the interim, he will have blood pressures monitored through oncology and radiology.  Patient and wife expressed understanding of and agreement with this.  All questions were answered.         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  This is a rather recent diagnosis, with the mass being discovered in February 2024.  Patient is currently in the midst of radiation treatment and reports he has approximately 3 weeks left of this.           Subjective   Phani is a 74 year old, presenting for the following health issues:  Hypertension (Running high.)         "5/9/2024     3:19 PM   Additional Questions   Accompanied by Wife-Karla         5/9/2024     3:19 PM   Patient Reported Additional Medications   Patient reports taking the following new medications no     Blood pressure  Persistently elevated since June of 2023. He had amlodipine added at that time. Thinks that amlodipine has been causing dizziness so would prefer not to take this  Recent tongue cancer diagnosis with current treatment plan in place. Elevations were present prior to this.  Sometime has a headache, but denies any vision changes or dizziness.    History of Present Illness       Hypertension: He presents for follow up of hypertension.  He does check blood pressure  regularly outside of the clinic. Outside blood pressures have been over 140/90. He follows a low salt diet.     He eats 0-1 servings of fruits and vegetables daily.He consumes 0 sweetened beverage(s) daily.He exercises with enough effort to increase his heart rate 30 to 60 minutes per day.  He exercises with enough effort to increase his heart rate 6 days per week.   He is taking medications regularly.         Objective    /62 (BP Location: Left arm, Patient Position: Sitting, Cuff Size: Adult Large)   Pulse 73   Temp 98  F (36.7  C) (Oral)   Resp 12   Ht 1.765 m (5' 9.5\")   Wt 79.5 kg (175 lb 4 oz)   SpO2 99%   BMI 25.51 kg/m    Body mass index is 25.51 kg/m .    Physical Exam  Vitals and nursing note reviewed.   Constitutional:       General: He is not in acute distress.     Appearance: Normal appearance.   Cardiovascular:      Rate and Rhythm: Normal rate and regular rhythm.   Pulmonary:      Effort: Pulmonary effort is normal.   Neurological:      Mental Status: He is alert.             Signed Electronically by: HIREN Moody CNP    "

## 2024-05-10 ENCOUNTER — PATIENT OUTREACH (OUTPATIENT)
Dept: ONCOLOGY | Facility: HOSPITAL | Age: 74
End: 2024-05-10
Payer: MEDICARE

## 2024-05-10 DIAGNOSIS — C01 PRIMARY SQUAMOUS CELL CARCINOMA OF BASE OF TONGUE (H): ICD-10-CM

## 2024-05-10 DIAGNOSIS — K12.31 MUCOSITIS DUE TO CHEMOTHERAPY: ICD-10-CM

## 2024-05-10 RX ORDER — MORPHINE SULFATE 10 MG/5ML
10-20 SOLUTION ORAL EVERY 4 HOURS PRN
Qty: 250 ML | Refills: 0 | Status: CANCELLED | OUTPATIENT
Start: 2024-05-10

## 2024-05-10 NOTE — TELEPHONE ENCOUNTER
Morphine 10MG/5ML solution   Directions: Take 5-10 mLs (10-20 mg) by mouth every 4 hours as needed for pain.    Last Written Prescription Date:  5/6/24, filled by Dr. Aldana  Last Fill Quantity: 250 ML,  # refills: 0   Last office visit provider:  5/6 with Dr. Jovan Mcgregor RN 05/10/24 12:09 PM

## 2024-05-10 NOTE — PROGRESS NOTES
Call to [facility] to verify the Morphine Rx was unable to be filled. Per pharmacy staff, they do not have medication in stock, if patient needs refill quickly a new script will need to be sent to other location and [facility] [address] will cancel current script. [facility] [address] is unable to see if other pharmacy's have medication in stock because it is a controlled substance.    Spoke with Patient's wife, Karla (consent to communicate on file), Per Karla, pharmacist says it is in stock at [facility] on [address].    High priority message and secure chat sent to Dr. Aldana for Rx refill. Message also sent to Dr. Ruiz's care coordinator. Update 4:49 pm. Return call to patient to let them know message has been sent but Dr. Aldana had a busy clinic day so he will review Rx request when able. Ensured that patient/wife have information on how to contact on call provider over the weekend if any issues arise.     Maisha Mcgregor RN  05/10/24  12:06 PM

## 2024-05-11 DIAGNOSIS — C01 PRIMARY SQUAMOUS CELL CARCINOMA OF BASE OF TONGUE (H): Primary | ICD-10-CM

## 2024-05-11 DIAGNOSIS — K12.31 MUCOSITIS DUE TO CHEMOTHERAPY: ICD-10-CM

## 2024-05-11 RX ORDER — MORPHINE SULFATE 10 MG/5ML
10-20 SOLUTION ORAL EVERY 4 HOURS PRN
Qty: 160 ML | Refills: 0 | Status: SHIPPED | OUTPATIENT
Start: 2024-05-11 | End: 2024-05-14

## 2024-05-14 ENCOUNTER — APPOINTMENT (OUTPATIENT)
Dept: RADIATION ONCOLOGY | Facility: CLINIC | Age: 74
End: 2024-05-14
Attending: RADIOLOGY
Payer: MEDICARE

## 2024-05-14 DIAGNOSIS — C01 PRIMARY SQUAMOUS CELL CARCINOMA OF BASE OF TONGUE (H): ICD-10-CM

## 2024-05-14 DIAGNOSIS — K12.31 MUCOSITIS DUE TO CHEMOTHERAPY: ICD-10-CM

## 2024-05-14 PROCEDURE — 77386 HC IMRT TREATMENT DELIVERY, COMPLEX: CPT | Performed by: STUDENT IN AN ORGANIZED HEALTH CARE EDUCATION/TRAINING PROGRAM

## 2024-05-14 PROCEDURE — 77336 RADIATION PHYSICS CONSULT: CPT | Performed by: RADIOLOGY

## 2024-05-14 PROCEDURE — 77427 RADIATION TX MANAGEMENT X5: CPT | Performed by: RADIOLOGY

## 2024-05-14 PROCEDURE — 77014 PR CT GUIDE FOR PLACEMENT RADIATION THERAPY FIELDS: CPT | Mod: 26 | Performed by: STUDENT IN AN ORGANIZED HEALTH CARE EDUCATION/TRAINING PROGRAM

## 2024-05-14 RX ORDER — MORPHINE SULFATE 10 MG/5ML
10-20 SOLUTION ORAL EVERY 4 HOURS PRN
Qty: 450 ML | Refills: 0 | Status: SHIPPED | OUTPATIENT
Start: 2024-05-14 | End: 2024-05-24

## 2024-05-15 ENCOUNTER — TELEPHONE (OUTPATIENT)
Dept: ONCOLOGY | Facility: HOSPITAL | Age: 74
End: 2024-05-15

## 2024-05-15 ENCOUNTER — OFFICE VISIT (OUTPATIENT)
Dept: RADIATION ONCOLOGY | Facility: CLINIC | Age: 74
End: 2024-05-15
Attending: RADIOLOGY
Payer: MEDICARE

## 2024-05-15 VITALS
SYSTOLIC BLOOD PRESSURE: 118 MMHG | BODY MASS INDEX: 24.13 KG/M2 | WEIGHT: 165.8 LBS | OXYGEN SATURATION: 98 % | HEART RATE: 81 BPM | TEMPERATURE: 98.3 F | RESPIRATION RATE: 16 BRPM | DIASTOLIC BLOOD PRESSURE: 56 MMHG

## 2024-05-15 DIAGNOSIS — C01 PRIMARY SQUAMOUS CELL CARCINOMA OF BASE OF TONGUE (H): ICD-10-CM

## 2024-05-15 DIAGNOSIS — C01 MALIGNANT NEOPLASM OF BASE OF TONGUE (H): Primary | ICD-10-CM

## 2024-05-15 PROCEDURE — 77386 HC IMRT TREATMENT DELIVERY, COMPLEX: CPT | Performed by: STUDENT IN AN ORGANIZED HEALTH CARE EDUCATION/TRAINING PROGRAM

## 2024-05-15 PROCEDURE — 77014 PR CT GUIDE FOR PLACEMENT RADIATION THERAPY FIELDS: CPT | Mod: 26 | Performed by: STUDENT IN AN ORGANIZED HEALTH CARE EDUCATION/TRAINING PROGRAM

## 2024-05-15 ASSESSMENT — PAIN SCALES - GENERAL: PAINLEVEL: MODERATE PAIN (4)

## 2024-05-15 NOTE — PROGRESS NOTES
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       Tolerating radiation therapy well.  All questions and concerns addressed.  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  Radiation:   Site: BOT  Stereotactic Radiosurgery: No  Concurrent Therapy: Yes (weekly cisplatin)  Today's Dose: 5200  Total Dose for Head and Neck: 7000  Today's Fraction/Total Fraction Head and Neck: 26/35    Discussed importance of nutrition, try to increase to 3 protein shakes per day but likely requires more which he can try to slowly improve upon    Pain Management Plan: Continue current regimen    Subjective:      HPI: Donovan Yi is a 74 year old male with  Malignant neoplasm of base of tongue (H) [C01]    He is having challenges with continuing daily radiation treatments.  Missed Friday and Monday of this week.  Significant pain for which he is using Magic mouthwash and narcotics as well as gabapentin.  Dry mouth notable.  Limited oral nutrition-1 protein shake per day.  Cup of pudding and some soup.  Lost 10 pounds since last week.  Unable to tolerate mask for radiation so taping head which becomes increasingly difficult to reproduce positioning as accurately with significant weight loss.  He is not interested in feeding tube.    The following portions of the patient's history were reviewed and updated as appropriate: allergies, current medications, past family history, past medical history, past social history, past surgical history and problem list.    Assessment                  Body Site:  Head and Neck Site:  BOT  Stereotactic Radiosurgery: No  Concurrent Therapy: Yes (weekly cisplatin)  Today's Dose: 5200  Total Dose for Head and Neck: 7000  Today's Fraction/Total Fraction Head and Neck: 26/35  Mucositis due to radiation: 2: Patchy pseudomembranous reaction (patches generally less than or equal to 1.5 cm in diameter and noncontiguous)  Thrush: 0: Absent  Pharynx and Esphogaus: 2: Tolerates soft diet  Voice Chances/Stridor/Larynx: 0: Normal  Ocular/Visual - other : 0: Normal  Middle ear/hearin: Normal  Tinnitus: 0: No  Cough: 0: Absent                                   Sexuality Alteration                    Emotional Alteration    Copin: Ineffective  Comfort Alteration   KPS: 90% Can perform normal activity, minor signs of disease  Fatigue (ONS scale): 0: No Fatigue  Pain Location: mouth/throat  Pain Description: Ache - Muscular type ache  Pain Intervention: 3: Opiods (hydrocodone, gabapentin, belbuca, magic mouthwash)  Effectiveness of pain intervention: 2: Pain relieved 50%   Nutrition Alteration   Anorexia: 1: Loss of appetite  Nausea: 0: None  Vomitin: None  Weight: 75.2 kg (165 lb 12.8 oz)  Pharynx and Esphogaus: 2: Tolerates soft diet  Skin Alteration   Skin Sensation: 0: No problem  Skin Reaction: 2: Bright erythema  AUA Assessment                                           Accompanied by       Objective:     Exam:     Vitals:    05/15/24 1226   Weight: 75.2 kg (165 lb 12.8 oz)       Wt Readings from Last 8 Encounters:   05/15/24 75.2 kg (165 lb 12.8 oz)   24 79.5 kg (175 lb 4 oz)   24 78 kg (172 lb)   24 81.7 kg (180 lb 3.2 oz)   24 80.3 kg (177 lb)   24 80.7 kg (177 lb 14.4 oz)   24 81.5 kg (179 lb 11.2 oz)   24 82.2 kg (181 lb 4.8 oz)       General: Alert and oriented, in no acute distress  Christopher has mild Erythema.  Patchy mucositis of oropharynx with no thrush.    Treatment Summary to Date    Aria chart and setup information  reviewed        Evelyn Victor MD

## 2024-05-15 NOTE — LETTER
5/15/2024         RE: Donovan Yi  [address] [facility] [address]        Dear Colleague,    Thank you for referring your patient, Donovan Yi, to the [facility] RADIATION ONCOLOGY [place]. Please see a copy of my visit note below.    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       Tolerating radiation therapy well.  All questions and concerns addressed.  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  Radiation:   Site: BOT  Stereotactic Radiosurgery: No  Concurrent Therapy: Yes (weekly cisplatin)  Today's Dose: 5200  Total Dose for Head and Neck: 7000  Today's Fraction/Total Fraction Head and Neck: 26/35    Discussed importance of nutrition, try to increase to 3 protein shakes per day but likely requires more which he can try to slowly improve upon    Pain Management Plan: Continue current regimen    Subjective:      HPI: Donovan Yi is a 74 year old male with  Malignant neoplasm of base of tongue (H) [C01]    He is having challenges with continuing daily radiation treatments.  Missed Friday and Monday of this week.  Significant pain for which he is using Magic mouthwash and narcotics as well as gabapentin.  Dry mouth notable.  Limited oral nutrition-1 protein shake per day.  Cup of pudding and some soup.  Lost 10 pounds since last week.  Unable to tolerate mask for radiation so taping head which becomes increasingly difficult to reproduce positioning as accurately with significant weight loss.  He is not interested in feeding tube.    The following portions  of the patient's history were reviewed and updated as appropriate: allergies, current medications, past family history, past medical history, past social history, past surgical history and problem list.    Assessment                  Body Site:  Head and Neck Site: BOT  Stereotactic Radiosurgery: No  Concurrent Therapy: Yes (weekly cisplatin)  Today's Dose: 5200  Total Dose for Head and Neck: 7000  Today's Fraction/Total Fraction Head and Neck: 26/35  Mucositis due to radiation: 2: Patchy pseudomembranous reaction (patches generally less than or equal to 1.5 cm in diameter and noncontiguous)  Thrush: 0: Absent  Pharynx and Esphogaus: 2: Tolerates soft diet  Voice Chances/Stridor/Larynx: 0: Normal  Ocular/Visual - other : 0: Normal  Middle ear/hearin: Normal  Tinnitus: 0: No  Cough: 0: Absent                                   Sexuality Alteration                    Emotional Alteration    Copin: Ineffective  Comfort Alteration   KPS: 90% Can perform normal activity, minor signs of disease  Fatigue (ONS scale): 0: No Fatigue  Pain Location: mouth/throat  Pain Description: Ache - Muscular type ache  Pain Intervention: 3: Opiods (hydrocodone, gabapentin, belbuca, magic mouthwash)  Effectiveness of pain intervention: 2: Pain relieved 50%   Nutrition Alteration   Anorexia: 1: Loss of appetite  Nausea: 0: None  Vomitin: None  Weight: 75.2 kg (165 lb 12.8 oz)  Pharynx and Esphogaus: 2: Tolerates soft diet  Skin Alteration   Skin Sensation: 0: No problem  Skin Reaction: 2: Bright erythema  AUA Assessment                                           Accompanied by       Objective:     Exam:     Vitals:    05/15/24 1226   Weight: 75.2 kg (165 lb 12.8 oz)       Wt Readings from Last 8 Encounters:   05/15/24 75.2 kg (165 lb 12.8 oz)   24 79.5 kg (175 lb 4 oz)   24 78 kg (172 lb)   24 81.7 kg (180 lb 3.2 oz)   24 80.3 kg (177 lb)   24 80.7 kg (177 lb 14.4 oz)   24 81.5 kg  (179 lb 11.2 oz)   04/17/24 82.2 kg (181 lb 4.8 oz)       General: Alert and oriented, in no acute distress  Christopher has mild Erythema.  Patchy mucositis of oropharynx with no thrush.    Treatment Summary to Date    Aria chart and setup information reviewed    Evelyn Victor MD      Again, thank you for allowing me to participate in the care of your patient.        Sincerely,        Evelyn Victor MD

## 2024-05-15 NOTE — TELEPHONE ENCOUNTER
Prior Authorization Approval    Medication: MORPHINE SULFATE 10 MG/5ML PO SOLN  Authorization Effective Date: 5/15/2024  Authorization Expiration Date: 12/31/2024  Approved Dose/Quantity: 450ml/11 days  Reference #: [id number]   Insurance Company: [employer]'s - Phone [phone] Fax [phone]  Which Pharmacy is filling the prescription: [facility] [address] [place] [address] [address] AT [place] [id number]  Pharmacy Notified: Yes

## 2024-05-16 ENCOUNTER — APPOINTMENT (OUTPATIENT)
Dept: RADIATION ONCOLOGY | Facility: CLINIC | Age: 74
End: 2024-05-16
Attending: RADIOLOGY
Payer: MEDICARE

## 2024-05-16 PROCEDURE — 77386 HC IMRT TREATMENT DELIVERY, COMPLEX: CPT | Performed by: STUDENT IN AN ORGANIZED HEALTH CARE EDUCATION/TRAINING PROGRAM

## 2024-05-16 PROCEDURE — 77014 PR CT GUIDE FOR PLACEMENT RADIATION THERAPY FIELDS: CPT | Mod: 26 | Performed by: STUDENT IN AN ORGANIZED HEALTH CARE EDUCATION/TRAINING PROGRAM

## 2024-05-17 ENCOUNTER — APPOINTMENT (OUTPATIENT)
Dept: RADIATION ONCOLOGY | Facility: CLINIC | Age: 74
End: 2024-05-17
Attending: RADIOLOGY
Payer: MEDICARE

## 2024-05-17 ENCOUNTER — TELEPHONE (OUTPATIENT)
Dept: PALLIATIVE CARE | Facility: CLINIC | Age: 74
End: 2024-05-17

## 2024-05-17 PROCEDURE — 77014 PR CT GUIDE FOR PLACEMENT RADIATION THERAPY FIELDS: CPT | Mod: 26 | Performed by: RADIOLOGY

## 2024-05-17 PROCEDURE — 77386 HC IMRT TREATMENT DELIVERY, COMPLEX: CPT

## 2024-05-19 ENCOUNTER — TELEPHONE (OUTPATIENT)
Dept: RADIATION ONCOLOGY | Facility: CLINIC | Age: 74
End: 2024-05-19
Payer: MEDICARE

## 2024-05-20 ENCOUNTER — HOSPITAL ENCOUNTER (EMERGENCY)
Facility: CLINIC | Age: 74
Discharge: HOME | End: 2024-05-20
Attending: EMERGENCY MEDICINE | Admitting: EMERGENCY MEDICINE
Payer: MEDICARE

## 2024-05-20 ENCOUNTER — PATIENT OUTREACH (OUTPATIENT)
Dept: ONCOLOGY | Facility: HOSPITAL | Age: 74
End: 2024-05-20

## 2024-05-20 ENCOUNTER — TELEPHONE (OUTPATIENT)
Dept: ONCOLOGY | Facility: CLINIC | Age: 74
End: 2024-05-20
Payer: MEDICARE

## 2024-05-20 ENCOUNTER — TELEPHONE (OUTPATIENT)
Dept: RADIATION ONCOLOGY | Facility: CLINIC | Age: 74
End: 2024-05-20
Payer: MEDICARE

## 2024-05-20 VITALS
OXYGEN SATURATION: 98 % | HEIGHT: 70 IN | SYSTOLIC BLOOD PRESSURE: 161 MMHG | RESPIRATION RATE: 22 BRPM | HEART RATE: 94 BPM | BODY MASS INDEX: 23.62 KG/M2 | DIASTOLIC BLOOD PRESSURE: 88 MMHG | TEMPERATURE: 97.5 F | WEIGHT: 165 LBS

## 2024-05-20 DIAGNOSIS — E87.6 HYPOKALEMIA: ICD-10-CM

## 2024-05-20 DIAGNOSIS — K52.9 GASTROENTERITIS: ICD-10-CM

## 2024-05-20 LAB
ALBUMIN SERPL BCG-MCNC: 4.4 G/DL (ref 3.5–5.2)
ALP SERPL-CCNC: 75 U/L (ref 40–150)
ALT SERPL W P-5'-P-CCNC: 42 U/L (ref 0–70)
ANION GAP SERPL CALCULATED.3IONS-SCNC: 22 MMOL/L (ref 7–15)
AST SERPL W P-5'-P-CCNC: 31 U/L (ref 0–45)
BASOPHILS # BLD AUTO: 0 10E3/UL (ref 0–0.2)
BASOPHILS NFR BLD AUTO: 1 %
BILIRUB SERPL-MCNC: 0.8 MG/DL
BUN SERPL-MCNC: 36.2 MG/DL (ref 8–23)
CALCIUM SERPL-MCNC: 8.9 MG/DL (ref 8.8–10.2)
CHLORIDE SERPL-SCNC: 94 MMOL/L (ref 98–107)
CREAT SERPL-MCNC: 1.45 MG/DL (ref 0.67–1.17)
DEPRECATED HCO3 PLAS-SCNC: 21 MMOL/L (ref 22–29)
EGFRCR SERPLBLD CKD-EPI 2021: 50 ML/MIN/1.73M2
EOSINOPHIL # BLD AUTO: 0 10E3/UL (ref 0–0.7)
EOSINOPHIL NFR BLD AUTO: 0 %
ERYTHROCYTE [DISTWIDTH] IN BLOOD BY AUTOMATED COUNT: 17.5 % (ref 10–15)
GLUCOSE SERPL-MCNC: 131 MG/DL (ref 70–99)
HCT VFR BLD AUTO: 38 % (ref 40–53)
HGB BLD-MCNC: 13.3 G/DL (ref 13.3–17.7)
IMM GRANULOCYTES # BLD: 0 10E3/UL
IMM GRANULOCYTES NFR BLD: 1 %
LIPASE SERPL-CCNC: 107 U/L (ref 13–60)
LYMPHOCYTES # BLD AUTO: 0.4 10E3/UL (ref 0.8–5.3)
LYMPHOCYTES NFR BLD AUTO: 7 %
MCH RBC QN AUTO: 31.2 PG (ref 26.5–33)
MCHC RBC AUTO-ENTMCNC: 35 G/DL (ref 31.5–36.5)
MCV RBC AUTO: 89 FL (ref 78–100)
MONOCYTES # BLD AUTO: 1.1 10E3/UL (ref 0–1.3)
MONOCYTES NFR BLD AUTO: 20 %
NEUTROPHILS # BLD AUTO: 4 10E3/UL (ref 1.6–8.3)
NEUTROPHILS NFR BLD AUTO: 72 %
NRBC # BLD AUTO: 0 10E3/UL
NRBC BLD AUTO-RTO: 0 /100
PLATELET # BLD AUTO: 199 10E3/UL (ref 150–450)
POTASSIUM SERPL-SCNC: 3 MMOL/L (ref 3.4–5.3)
PROT SERPL-MCNC: 7.3 G/DL (ref 6.4–8.3)
RBC # BLD AUTO: 4.26 10E6/UL (ref 4.4–5.9)
SODIUM SERPL-SCNC: 137 MMOL/L (ref 135–145)
WBC # BLD AUTO: 5.6 10E3/UL (ref 4–11)

## 2024-05-20 PROCEDURE — 258N000003 HC RX IP 258 OP 636: Performed by: EMERGENCY MEDICINE

## 2024-05-20 PROCEDURE — 96375 TX/PRO/DX INJ NEW DRUG ADDON: CPT

## 2024-05-20 PROCEDURE — 85004 AUTOMATED DIFF WBC COUNT: CPT | Performed by: EMERGENCY MEDICINE

## 2024-05-20 PROCEDURE — 99284 EMERGENCY DEPT VISIT MOD MDM: CPT | Mod: 25

## 2024-05-20 PROCEDURE — 96365 THER/PROPH/DIAG IV INF INIT: CPT | Mod: 59

## 2024-05-20 PROCEDURE — 96361 HYDRATE IV INFUSION ADD-ON: CPT

## 2024-05-20 PROCEDURE — 83690 ASSAY OF LIPASE: CPT | Performed by: EMERGENCY MEDICINE

## 2024-05-20 PROCEDURE — 80053 COMPREHEN METABOLIC PANEL: CPT | Performed by: EMERGENCY MEDICINE

## 2024-05-20 PROCEDURE — 250N000009 HC RX 250: Performed by: EMERGENCY MEDICINE

## 2024-05-20 PROCEDURE — 36415 COLL VENOUS BLD VENIPUNCTURE: CPT | Performed by: EMERGENCY MEDICINE

## 2024-05-20 PROCEDURE — 250N000013 HC RX MED GY IP 250 OP 250 PS 637: Performed by: EMERGENCY MEDICINE

## 2024-05-20 PROCEDURE — 250N000011 HC RX IP 250 OP 636: Performed by: EMERGENCY MEDICINE

## 2024-05-20 RX ORDER — LOPERAMIDE HCL 2 MG
2 CAPSULE ORAL ONCE
Status: COMPLETED | OUTPATIENT
Start: 2024-05-20 | End: 2024-05-20

## 2024-05-20 RX ORDER — MAGNESIUM HYDROXIDE/ALUMINUM HYDROXICE/SIMETHICONE 120; 1200; 1200 MG/30ML; MG/30ML; MG/30ML
15 SUSPENSION ORAL ONCE
Status: COMPLETED | OUTPATIENT
Start: 2024-05-20 | End: 2024-05-20

## 2024-05-20 RX ORDER — LIDOCAINE HYDROCHLORIDE 20 MG/ML
15 SOLUTION OROPHARYNGEAL ONCE
Status: COMPLETED | OUTPATIENT
Start: 2024-05-20 | End: 2024-05-20

## 2024-05-20 RX ORDER — ONDANSETRON 2 MG/ML
4 INJECTION INTRAMUSCULAR; INTRAVENOUS EVERY 30 MIN PRN
Status: DISCONTINUED | OUTPATIENT
Start: 2024-05-20 | End: 2024-05-21 | Stop reason: HOSPADM

## 2024-05-20 RX ORDER — POTASSIUM CHLORIDE 7.45 MG/ML
10 INJECTION INTRAVENOUS ONCE
Status: COMPLETED | OUTPATIENT
Start: 2024-05-20 | End: 2024-05-20

## 2024-05-20 RX ORDER — ONDANSETRON 4 MG/1
4 TABLET, ORALLY DISINTEGRATING ORAL EVERY 8 HOURS PRN
Qty: 10 TABLET | Refills: 0 | Status: SHIPPED | OUTPATIENT
Start: 2024-05-20 | End: 2024-07-26

## 2024-05-20 RX ADMIN — POTASSIUM CHLORIDE 10 MEQ: 7.46 INJECTION, SOLUTION INTRAVENOUS at 21:57

## 2024-05-20 RX ADMIN — LIDOCAINE HYDROCHLORIDE 15 ML: 20 SOLUTION ORAL at 22:27

## 2024-05-20 RX ADMIN — SODIUM CHLORIDE 500 ML: 9 INJECTION, SOLUTION INTRAVENOUS at 21:58

## 2024-05-20 RX ADMIN — MORPHINE SULFATE 6 MG: 10 INJECTION, SOLUTION INTRAMUSCULAR; INTRAVENOUS at 21:52

## 2024-05-20 RX ADMIN — LOPERAMIDE HYDROCHLORIDE 2 MG: 2 CAPSULE ORAL at 21:27

## 2024-05-20 RX ADMIN — ONDANSETRON 4 MG: 2 INJECTION INTRAMUSCULAR; INTRAVENOUS at 20:47

## 2024-05-20 RX ADMIN — ALUMINUM HYDROXIDE, MAGNESIUM HYDROXIDE, AND DIMETHICONE 15 ML: 200; 20; 200 SUSPENSION ORAL at 22:28

## 2024-05-20 RX ADMIN — SODIUM CHLORIDE 1000 ML: 9 INJECTION, SOLUTION INTRAVENOUS at 20:44

## 2024-05-20 ASSESSMENT — COLUMBIA-SUICIDE SEVERITY RATING SCALE - C-SSRS
6. HAVE YOU EVER DONE ANYTHING, STARTED TO DO ANYTHING, OR PREPARED TO DO ANYTHING TO END YOUR LIFE?: NO
2. HAVE YOU ACTUALLY HAD ANY THOUGHTS OF KILLING YOURSELF IN THE PAST MONTH?: NO
1. IN THE PAST MONTH, HAVE YOU WISHED YOU WERE DEAD OR WISHED YOU COULD GO TO SLEEP AND NOT WAKE UP?: NO

## 2024-05-20 ASSESSMENT — ACTIVITIES OF DAILY LIVING (ADL)
ADLS_ACUITY_SCORE: 35

## 2024-05-20 NOTE — TELEPHONE ENCOUNTER
Called pt. wife Karla to see if they got our message this weekend that our machine is down. I left a message saying to call back to let us know not to come for treatment.

## 2024-05-20 NOTE — TELEPHONE ENCOUNTER
Spoke with patient/wife to inform machine down for tomorrow. Cancelled appt. Patient knows not to come for treatment. Has some flu variant anyway and has been very sick all weekend. They had been speaking with a nurse and were advised to possibly go to the ER. So patient/wife said they were likely going to cancel treatment anyway.

## 2024-05-20 NOTE — PROGRESS NOTES
[facility]: Cancer Care                                                                                          1146 Karla called the nurse triage line to relay symptoms and concerns she has  of Phani  -She shared that on Saturday, Phani stopped eating as his mouth was very sore. He is in the midst of radiation at this time and has dry mouth that is getting more tender with treatment. He has liquid morphine at home but has not taken since Saturday as he did not have food in his stomach. He does have oral rinses to help with his mouth pain but does not want to take.  -Sunday,, late evening, she and Phani both developed stomach cramps. Phani vomited 4 times and had 4 episodes of diarrhea overnight (from Sun to Monday morning.  He had 2 more episodes this morning. He is weak and only walks from the bathroom to his bedroom. He did not have anything to drink yesterday due to mouth pain and then he developed flu symptoms and lost more fluid with diarrhea and vomiting. He has been able to drink 20 oz of water this morning and so far he has not vomited. He has not had further diarrhea since he had fluids.He does not have nausea today or abdominal cramping. He does not want to drink anymore at this time due to mouth pain and he is also afraid that he will have diarrhea if he drinks. He does not want to eat due to mouth pain and not being able to take medication for pain due to no food in his stomach.  He does not have a feeding tube, it has been recommended in the past but has declined placement.  -Relayed that there are many layer of concerns at this time.  Dehydration due to vomiting and diarrhea as well as choice not to drink due to mouth pain,  Poor food intake due to mouth pain, now compounded with recent history of diarrhea and vomiting.  Likely electrolyte imbalance  secondary to vomiting, diarrhea and poor oral intake  Pain that is uncontrolled as not taking pain medication secondary to not eating. Not eating  compounding dehydration and nutritional status.  Weakness due to recent diarrhea, vomiting and poor oral intake    Directed to ER for further workup and management of symptoms Advised against imodium at this time as stool culture may be indicated with his acute diarrhea. He needs IV hydration as he is dehydrated, electrolyte replacement for likely electrolyte imbalance. Suggested that if Karla does not feel that she can transport him, she can call ambulance to assist. He has been resistant to going to ER as this is what Karla has also suggested and encouraged today. She will talk with him again after we end our phone  call to advise of our recommendations. Encouraged to call us if further needs . ER not called as unsure if he will agree to go.    1221 Called Karla to check in to see if Phani will agree to go to ER. If so, writer will call ahead to ER of his choice to give report. Phani had another episode of diarrhea since we talked but still is refusing to go to ER due to his past history of wait times. Reiterated recommendations and rational to go. She stated understanding and will continue to encourage Phani to go to ER today. Will send update to BLANCO Callahan and Dr. Aldana      Signature:  Carleen Winters RN

## 2024-05-20 NOTE — TELEPHONE ENCOUNTER
Pt's wife is calling.  Asking to speak to Dr Atkins.  States that pt has come down with nororvirus and has had vomiting and diarrhea. They are worried that pt is dehydrated. They are hoping that Dr Atkins can provide some guidance.  Per chart review, pt met with Dr Atkins on 4/16/2024 for initial consult appt, and has not had any further appts with Dr Atkins.  Pt is still going through radiation therapy, and just completed his last dose of chemo on 5/8/2024 for base of tongue cancer.  Writer advised pt's wife that they should contact medical oncologist Dr Aldana as that provider should be the one to manage these current symptoms. Dr Aldana team would also be able to assist with IV fluids if needed today.  Writer also contacted palliative care nurse, Fidelina Mills, who agrees that medical oncology should be contacted.    Pt's wife states that she will call medical oncology clinic now.  Patient's wife verbalized understanding and agreement with plan.    Sandee Loo RN on 5/20/2024 at 10:51 AM

## 2024-05-21 ENCOUNTER — PATIENT OUTREACH (OUTPATIENT)
Dept: ONCOLOGY | Facility: HOSPITAL | Age: 74
End: 2024-05-21
Payer: MEDICARE

## 2024-05-21 NOTE — PROGRESS NOTES
[facility]: Cancer Care                                                                                          Left a voice message checking to see how the patient was doing since getting the flu a few days ago. Left a call back number.     Signature:  Sindy Ortiz RN

## 2024-05-21 NOTE — ED TRIAGE NOTES
Pt arrives to ed w/ c/o of abd pain, mouth pain, nausea, vomiting, and diarrhea. Pt  has hx of mouth cancer. Just finished chemo last week, still doing radiation.     Triage Assessment (Adult)       Row Name 05/20/24 1924 05/20/24 1923       Triage Assessment    Airway WDL WDL WDL       Respiratory WDL    Respiratory WDL WDL --       Cardiac WDL    Cardiac WDL WDL --       Cognitive/Neuro/Behavioral WDL    Cognitive/Neuro/Behavioral WDL WDL --

## 2024-05-21 NOTE — ED PROVIDER NOTES
EMERGENCY DEPARTMENT ENCOUNTER      NAME: Donovan Yi  AGE: 74 year old male  YOB: 1950  MRN: [id number]  EVALUATION DATE & TIME: No admission date for patient encounter.    PCP: Stephany Strickland    ED PROVIDER: French Angeles M.D.      Chief Complaint   Patient presents with    Nausea, Vomiting, & Diarrhea         FINAL IMPRESSION:  1. Gastroenteritis    2. Hypokalemia          ED COURSE & MEDICAL DECISION MAKIN:29 PM performed my initial evaluation the patient  9:43 PM repeat exam benign, patient states nausea is improved.  Endorses chronic mouth pain.  I discussed findings and need for potassium repletion.  11:09 PM repeat exam is benign patient states he feels improved.  Will discharge with close follow-up and return precautions.      Pertinent Labs & Imaging studies reviewed. (See chart for details)  74 year old male presents to the Emergency Department for evaluation of nausea, vomiting diarrhea. Patient appears non toxic with stable vitals signs, patient afebrile, did note slight tachycardia but no hypoxia or increased work of breathing.  Lungs are clear and abdomen is benign, I cannot reproduce any significant tenderness to the palpation my abdominal exam.  Patient denies any blood in his urine or stools, fevers, no new chest pain, denies any new mouth pain.  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  Certainly this may be contributing to his poor oral intake.  History and exam consistent with viral gastroenteritis, also because it affected his wife who now feels improved.  No focal tenderness, no fevers to suggest appendicitis, diverticulitis, cholecystitis or pancreatitis, no new chest or pulmonary complaints to suggest atypical ACS, PE or dissection.  Did consider CT  imaging the abdomen pelvis, discussed this with patient and spouse and offered CT imaging but they deferred at this time which I felt was reasonable given benign exam.  Concern for acute kidney injury, Wen abnormalities.  We will obtain screening labs and patient was given IV fluids and antiemetics.  No urinary symptoms or flank pain to suggest pyelonephritis, nephrolithiasis.    Reassessment: Labs by my independent interpretation concerning for hypokalemia with potassium 3.0.  Did note slightly elevated creatinine at 1.45 but certainly nothing to suggest marked acute kidney injury.  Elevated lipase of 107 but no focal epigastric tenderness and nothing to suggest pancreatitis as lipase is not markedly elevated.  Likely slightly elevated secondary to his frequent nausea and vomiting.  No signs of anemia with a hemoglobin of 13.3.  Following our interventions patient felt improved and had no vomiting here, did endorses chronic mouth pain and pain was addressed with some improvement on morphine.  Repeat exam was again benign after our interventions and patient felt comfortable with discharge home, spouse felt comfortable with discharge.  Considered admission for continued symptom management and observation however with improvement and otherwise reassuring workup and vitals feel he is safe for discharge.  Will discharge with Zofran prescription and recommend he call Dr. Aldana tomorrow his primary oncologist for continued outpatient management evaluation.  All of his questions were answered and reasons to return discussed.  Patient and spouse felt comfortable with this plan he was discharged in stable condition.    Medical Decision Making  Obtained supplemental history:Supplemental history obtained?: Documented in chart and Family Member/Significant Other  Reviewed external records: External records reviewed?: Documented in chart  Care impacted by chronic illness:Cancer/Chemotherapy  Care significantly affected by  social determinants of health:N/A  Did you consider but not order tests?: Work up considered but not performed and documented in chart, if applicable  Did you interpret images independently?: Independent interpretation of ECG and images noted in documentation, when applicable.  Consultation discussion with other provider:Did you involve another provider (consultant, , pharmacy, etc.)?: No  Discharge. I prescribed additional prescription strength medication(s) as charted. I considered admission, but discharged patient after significant clinical improvement.      At the conclusion of the encounter I discussed the results of all of the tests and the disposition. The questions were answered and return precautions provided. The patient or family acknowledged understanding and was agreeable with the care plan.         MEDICATIONS GIVEN IN THE EMERGENCY:  Medications   ondansetron (ZOFRAN) injection 4 mg (4 mg Intravenous $Given 5/20/24 2047)   sodium chloride 0.9% BOLUS 1,000 mL (0 mLs Intravenous Stopped 5/20/24 2158)   loperamide (IMODIUM) capsule 2 mg (2 mg Oral $Given 5/20/24 2127)   sodium chloride 0.9% BOLUS 500 mL (0 mLs Intravenous Stopped 5/20/24 2306)   potassium chloride 10 mEq in 100 mL sterile water infusion (0 mEq Intravenous Stopped 5/20/24 2306)   morphine injection 6 mg (6 mg Intravenous $Given 5/20/24 2152)   alum & mag hydroxide-simethicone (MAALOX) suspension 15 mL (15 mLs Oral $Given 5/20/24 2228)   lidocaine (viscous) (XYLOCAINE) 2 % solution 15 mL (15 mLs Mouth/Throat $Given 5/20/24 2227)       NEW PRESCRIPTIONS STARTED AT TODAY'S ER VISIT  Discharge Medication List as of 5/20/2024 11:15 PM        START taking these medications    Details   ondansetron (ZOFRAN ODT) 4 MG ODT tab Take 1 tablet (4 mg) by mouth every 8 hours as needed for nausea, Disp-10 tablet, R-0, Local Print                  =================================================================    HPI    Patient information was  obtained from: Patient, spouse    Use of Intrepreter: N/A         Russelharshilhéctor JUAN Yi is a 74 year old male who presents nausea, vomiting diarrhea.  Most of history is obtained from the spouse secondary to the patient's mouth pain.  He is currently undergoing outpatient radiation therapy secondary to malignant neoplasm of the base of the tongue.  Patient denied any new face, tongue or chest pain.  States that approximately 3 days ago he and his wife both had onset of nausea, vomiting diarrhea.  Associated with decreased p.o. intake, decreased fluid intake.  Patient was concerned for dehydration, called into his oncology clinic and was advised to come to the emergency department.  Again he denies any new chest pain, fevers, dysuria, denies any bloody stools, falls or trauma or focal weakness.      REVIEW OF SYSTEMS   Constitutional:  Denies fever, chills  Respiratory:  Denies productive cough or increased work of breathing  Cardiovascular:  Denies chest pain, palpitations  GI: Endorses nausea, vomiting diarrhea  Musculoskeletal:  Denies any new muscle/joint swelling  Skin:  Denies rash   Neurologic:  Denies focal weakness  All systems negative except as marked.     PAST MEDICAL HISTORY:  Past Medical History:   Diagnosis Date    Food intolerance in adult     Hypertension     Malignant neoplasm of base of tongue (H)        PAST SURGICAL HISTORY:  Past Surgical History:   Procedure Laterality Date    HC REMOVAL GALLBLADDER      Description: Cholecystectomy;  Recorded: 12/06/2011;         CURRENT MEDICATIONS:    Prior to Admission medications    Medication Sig Start Date End Date Taking? Authorizing Provider   aspirin (ASA) 325 MG tablet Take 1 tablet by mouth    Reported, Patient   Buprenorphine HCl (BELBUCA) 150 MCG FILM buccal film Place 1 Film (150 mcg) inside cheek every 12 hours for 1 day, THEN 2 Film (300 mcg) every 12 hours for 29 days.  Patient not taking: Reported on 5/9/2024 4/19/24 5/19/24   Cheryl Atkins DO   cyclobenzaprine (FLEXERIL) 10 MG tablet Take 10 mg by mouth 3 times daily as needed  Patient not taking: Reported on 5/9/2024 3/12/24   Reported, Patient   dexAMETHasone (DECADRON) 4 MG tablet Take 2 tablets (8 mg) by mouth daily Take for 3 days, starting the day after chemo. Take with food. If no nausea and vomiting with first cisplatin doses, may stop dexamethasone.  Patient not taking: Reported on 5/9/2024 3/18/24   Michael Aldana MD   diazepam (VALIUM) 5 MG tablet Take 1-2 tablets (5-10 mg) by mouth every 12 hours as needed for anxiety (prior to radiation treatments)  Patient not taking: Reported on 5/9/2024 4/1/24   Michael Aldana MD   doxepin (SINEQUAN) 10 MG/ML (HIGH CONC) solution Take 0.3-1 mLs (3-10 mg) by mouth at bedtime Start with 3 mg (0.3 mL) and increase by 1 mg (0.1 mL) nightly until effective.  Patient not taking: Reported on 5/9/2024 4/17/24   Cheryl Atkins DO   gabapentin (NEURONTIN) 300 MG capsule Take one capsule at bedtime for 3 days. Can increase to 2 capsules at bedtime or 1 capsule twice daily after 3 days.  Patient not taking: Reported on 5/15/2024 4/19/24   Cheryl Atkins DO   HYDROcodone-acetaminophen (NORCO)  MG per tablet     Reported, Patient   lidocaine, viscous, (XYLOCAINE) 2 % solution Swish and spit 15 mLs in mouth every 3 hours as needed for pain ; Max 8 doses/24 hour period. 5/6/24   Michael Aldana MD   LORazepam (ATIVAN) 0.5 MG tablet Take 1 tablet (0.5 mg) by mouth every 6 hours as needed for anxiety (Take 0.5 to 1.0 mg 1 hour before planned procedure.)  Patient not taking: Reported on 5/1/2024 3/18/24   Joyce Yang MD   losartan (COZAAR) 50 MG tablet Take 1 tablet (50 mg) by mouth daily  Patient not taking: Reported on 5/15/2024 5/9/24   Molly Nesbitt, APRN CNP   magic mouthwash suspension (diphenhydrAMINE, lidocaine, aluminum-magnesium & simethicone) Swish and spit 15 mLs in mouth every 2 hours as needed for  mouth sores  Patient not taking: Reported on 2024   Michael Aldana MD   magic mouthwash suspension (diphenhydrAMINE, lidocaine, aluminum-magnesium & simethicone) Swish and swallow 10 mLs in mouth every 4 hours as needed for mouth sores 24   Cheryl Atkins,    morphine 10 MG/5ML solution Take 5-10 mLs (10-20 mg) by mouth every 4 hours as needed for pain 24   Michael Aldana MD   Multiple Vitamins-Minerals (ONE-A-DAY 50 PLUS PO) Take 1 tablet by mouth daily    Reported, Patient   ondansetron (ZOFRAN) 8 MG tablet Take 1 tablet (8 mg) by mouth every 8 hours as needed for nausea (vomiting) 3/18/24   Michael Aldana MD   Probiotic Product (PROBIOTIC BLEND PO) Take 2 capsules by mouth daily Prebiotic and Probiotic blend    Reported, Patient   rosuvastatin (CRESTOR) 20 MG tablet Take 1 tablet by mouth at bedtime 7/3/23   Reported, Patient   sodium fluoride dental gel (PREVIDENT) 1.1 % GEL topical gel USE AS DIRECTED OVERNIGHT. DISPENSE A SMALL AMOUNT INTO UPPER AND LOWER TRAYS. DO NOT EAT OR DRINK AFTER 3/31/24   Reported, Patient   tamsulosin (FLOMAX) 0.4 MG capsule TAKE 1 CAPSULE(0.4 MG) BY MOUTH DAILY 23   Heidi Chairez MD        ALLERGIES:  Allergies   Allergen Reactions    Other Food Allergy Unknown     Many food intolerances       FAMILY HISTORY:  No family history on file.    SOCIAL HISTORY:   Social History     Socioeconomic History    Marital status:    Tobacco Use    Smoking status: Former     Current packs/day: 0.00     Average packs/day: 1 pack/day for 18.0 years (18.0 ttl pk-yrs)     Types: Cigarettes     Start date: 1966     Quit date: 1984     Years since quittin.4     Passive exposure: Never    Smokeless tobacco: Never   Vaping Use    Vaping status: Never Used     Social Determinants of Health     Interpersonal Safety: Low Risk  (2024)    Interpersonal Safety     Do you feel physically and emotionally safe where you currently live?: Yes      "Within the past 12 months, have you been hit, slapped, kicked or otherwise physically hurt by someone?: No     Within the past 12 months, have you been humiliated or emotionally abused in other ways by your partner or ex-partner?: No       VITALS:  Patient Vitals for the past 24 hrs:   BP Temp Temp src Pulse Resp SpO2 Height Weight   05/20/24 2301 -- -- -- 94 -- 98 % -- --   05/20/24 2131 (!) 161/88 -- -- 94 -- 100 % -- --   05/20/24 1921 118/66 97.5  F (36.4  C) Temporal 114 22 98 % 1.778 m (5' 10\") 74.8 kg (165 lb)        PHYSICAL EXAM    Constitutional:  Awake, alert, in no apparent distress  HENT:  Normocephalic, Atraumatic. Bilateral external ears normal. Oropharynx moist. Nose normal. Neck- Normal range of motion with no guarding, No midline cervical tenderness, Supple, No stridor.   Eyes:  PERRL, EOMI with no signs of entrapment, Conjunctiva normal, No discharge.   Respiratory:  Normal breath sounds, No respiratory distress, No wheezing.    Cardiovascular: Mild tachycardia, Normal rhythm, No appreciable rubs or gallops.   GI:  Soft, No tenderness, No distension, No palpable masses.  No rigidity or distention.  Musculoskeletal:  Intact distal pulses, No edema. Good range of motion in all major joints. No tenderness to palpation or major deformities noted.  Integument:  Warm, Dry, No erythema, No rash.   Neurologic:  Alert & oriented, Normal motor function, Normal sensory function, No focal deficits noted.   Psychiatric:  Affect normal, Judgment normal, Mood normal.     LAB:  All pertinent labs reviewed and interpreted.  Results for orders placed or performed during the hospital encounter of 05/20/24   Comprehensive metabolic panel   Result Value Ref Range    Sodium 137 135 - 145 mmol/L    Potassium 3.0 (L) 3.4 - 5.3 mmol/L    Carbon Dioxide (CO2) 21 (L) 22 - 29 mmol/L    Anion Gap 22 (H) 7 - 15 mmol/L    Urea Nitrogen 36.2 (H) 8.0 - 23.0 mg/dL    Creatinine 1.45 (H) 0.67 - 1.17 mg/dL    GFR Estimate 50 (L) >60 " mL/min/1.73m2    Calcium 8.9 8.8 - 10.2 mg/dL    Chloride 94 (L) 98 - 107 mmol/L    Glucose 131 (H) 70 - 99 mg/dL    Alkaline Phosphatase 75 40 - 150 U/L    AST 31 0 - 45 U/L    ALT 42 0 - 70 U/L    Protein Total 7.3 6.4 - 8.3 g/dL    Albumin 4.4 3.5 - 5.2 g/dL    Bilirubin Total 0.8 <=1.2 mg/dL   Result Value Ref Range    Lipase 107 (H) 13 - 60 U/L   CBC with platelets and differential   Result Value Ref Range    WBC Count 5.6 4.0 - 11.0 10e3/uL    RBC Count 4.26 (L) 4.40 - 5.90 10e6/uL    Hemoglobin 13.3 13.3 - 17.7 g/dL    Hematocrit 38.0 (L) 40.0 - 53.0 %    MCV 89 78 - 100 fL    MCH 31.2 26.5 - 33.0 pg    MCHC 35.0 31.5 - 36.5 g/dL    RDW 17.5 (H) 10.0 - 15.0 %    Platelet Count 199 150 - 450 10e3/uL    % Neutrophils 72 %    % Lymphocytes 7 %    % Monocytes 20 %    % Eosinophils 0 %    % Basophils 1 %    % Immature Granulocytes 1 %    NRBCs per 100 WBC 0 <1 /100    Absolute Neutrophils 4.0 1.6 - 8.3 10e3/uL    Absolute Lymphocytes 0.4 (L) 0.8 - 5.3 10e3/uL    Absolute Monocytes 1.1 0.0 - 1.3 10e3/uL    Absolute Eosinophils 0.0 0.0 - 0.7 10e3/uL    Absolute Basophils 0.0 0.0 - 0.2 10e3/uL    Absolute Immature Granulocytes 0.0 <=0.4 10e3/uL    Absolute NRBCs 0.0 10e3/uL       RADIOLOGY:  No orders to display          EKG:      I have independently reviewed and interpreted the EKG(s) documented above.    PROCEDURES:        [employer] System Documentation:       IFRENCH MD, am serving as a scribe to document services personally performed by French Angeles MD, based on my observation and the provider's statements to me. I, French Angeles MD attest that FRENCH ANGELES MD is acting in a scribe capacity, has observed my performance of the services and has documented them in accordance with my direction.    French Angeles M.D.  Emergency Medicine  [facility] EMERGENCY ROOM  [address] [place] [place] [id number] [phone]  Dept:  [phone]       French Angeles MD  05/20/24 6639

## 2024-05-22 ENCOUNTER — TELEPHONE (OUTPATIENT)
Dept: RADIATION ONCOLOGY | Facility: CLINIC | Age: 74
End: 2024-05-22
Payer: MEDICARE

## 2024-05-22 ENCOUNTER — MYC REFILL (OUTPATIENT)
Dept: ONCOLOGY | Facility: HOSPITAL | Age: 74
End: 2024-05-22
Payer: MEDICARE

## 2024-05-22 ENCOUNTER — APPOINTMENT (OUTPATIENT)
Dept: RADIATION ONCOLOGY | Facility: HOSPITAL | Age: 74
End: 2024-05-22
Attending: RADIOLOGY
Payer: MEDICARE

## 2024-05-22 DIAGNOSIS — K12.31 MUCOSITIS DUE TO CHEMOTHERAPY: ICD-10-CM

## 2024-05-22 PROCEDURE — 77386 HC IMRT TREATMENT DELIVERY, COMPLEX: CPT | Performed by: STUDENT IN AN ORGANIZED HEALTH CARE EDUCATION/TRAINING PROGRAM

## 2024-05-22 PROCEDURE — 77014 PR CT GUIDE FOR PLACEMENT RADIATION THERAPY FIELDS: CPT | Mod: 26 | Performed by: STUDENT IN AN ORGANIZED HEALTH CARE EDUCATION/TRAINING PROGRAM

## 2024-05-22 RX ORDER — LIDOCAINE HYDROCHLORIDE 20 MG/ML
15 SOLUTION OROPHARYNGEAL
Qty: 200 ML | Refills: 1 | Status: SHIPPED | OUTPATIENT
Start: 2024-05-22 | End: 2024-06-17

## 2024-05-22 NOTE — TELEPHONE ENCOUNTER
Last Written Prescription Date:  Both medications sent in on 5/6/24  Last Fill Quantity: Lidocaine 200 ml, magic mouthwash 480 ml,  # refills: 1   Last office visit provider:  5/6/24 with Dr. Aldana     Requested Prescriptions   Pending Prescriptions Disp Refills    lidocaine (viscous) (XYLOCAINE) 2 % solution 200 mL 1     Sig: Swish and spit 15 mLs in mouth every 3 hours as needed for pain ; Max 8 doses/24 hour period.       There is no refill protocol information for this order       magic mouthwash suspension (diphenhydrAMINE, lidocaine, aluminum-magnesium & simethicone) 480 mL 1     Sig: Swish and spit 15 mLs in mouth every 2 hours as needed for mouth sores       There is no refill protocol information for this order          Anju Jara RN 05/22/24 8:38 AM

## 2024-05-22 NOTE — TELEPHONE ENCOUNTER
Patient called earlier this week to coordinate care and treatments with the [employer] [facility] radiation machine being down and worked on.    Wife Karla called this morning to find out about the machine and states that both herself and Phani had the flu earlier this week and patient was in the ER for this getting fluids.  Per wife patient has improved but he is still only drinking water.  Karla wanted to know if they could see Dr. Yang as he was schedule to see him after treatment today.  Told Karla that patient treatment is being re-planned to treatment at the [facility] location and that Dr. Yang would be in clinic to see patient and wife on Thursday should they need additional assistance at that time.  Told wife we will call later in the day with a time for treatment at [facility] this afternoon.  Will also inform Dr. Yang of above.

## 2024-05-23 ENCOUNTER — OFFICE VISIT (OUTPATIENT)
Dept: RADIATION ONCOLOGY | Facility: HOSPITAL | Age: 74
End: 2024-05-23
Attending: RADIOLOGY
Payer: MEDICARE

## 2024-05-23 VITALS
SYSTOLIC BLOOD PRESSURE: 109 MMHG | BODY MASS INDEX: 22.79 KG/M2 | WEIGHT: 158.8 LBS | DIASTOLIC BLOOD PRESSURE: 62 MMHG | OXYGEN SATURATION: 97 % | HEART RATE: 102 BPM | RESPIRATION RATE: 20 BRPM

## 2024-05-23 DIAGNOSIS — C01 PRIMARY SQUAMOUS CELL CARCINOMA OF BASE OF TONGUE (H): Primary | ICD-10-CM

## 2024-05-23 PROCEDURE — 77427 RADIATION TX MANAGEMENT X5: CPT | Performed by: RADIOLOGY

## 2024-05-23 PROCEDURE — 77336 RADIATION PHYSICS CONSULT: CPT | Performed by: RADIOLOGY

## 2024-05-23 PROCEDURE — 77014 PR CT GUIDE FOR PLACEMENT RADIATION THERAPY FIELDS: CPT | Mod: 26 | Performed by: RADIOLOGY

## 2024-05-23 PROCEDURE — 77386 HC IMRT TREATMENT DELIVERY, COMPLEX: CPT | Performed by: RADIOLOGY

## 2024-05-23 ASSESSMENT — PAIN SCALES - GENERAL: PAINLEVEL: MODERATE PAIN (5)

## 2024-05-23 NOTE — PROGRESS NOTES
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  All questions and concerns addressed.    Plan:     Continue radiation treatment as prescribed.    His pain is manageable with pain medication.     Discussed with patient about appropriate nutritional support during the therapy.  We will arrange patient to have daily IV fluid during the remaining course of radiation therapy.     We will continue monitor patient closely.     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    Assessment                  Body Site:  Head and Neck Site: BOT  Stereotactic Radiosurgery: No  Concurrent Therapy: Yes (weekly cisplatin)  Today's Dose: 6000  Total Dose for Head and Neck: 7000  Today's Fraction/Total Fraction Head and Neck: 30/35  Mucositis due to radiation: 2: Patchy pseudomembranous reaction (patches generally less than or equal to 1.5 cm in diameter and noncontiguous)  Thrush: 0: Absent  Pharynx and Esphogaus: 2: Tolerates soft diet  Voice Chances/Stridor/Larynx: 0: Normal  Ocular/Visual - other : 1: Mild  Middle ear/hearin: Normal  Tinnitus: 0: No  Cough: 0: Absent                                   Sexuality Alteration                    Emotional Alteration    Copin: Ineffective  Comfort Alteration   KPS: 90% Can perform normal activity, minor signs of disease  Fatigue (ONS scale): 7: Extreme Fatigue  Pain Location: mouth/throat  Pain Intensity. Rate degree of pain ranging from 0 (no pain) to 10 (severe pain): 5  Pain  Description: Ache - Muscular type ache  Pain Intervention: 3: Opiods (hydrocodone, gabapentin, belbuca, magic mouthwash)  Effectiveness of pain intervention: 2: Pain relieved 50%   Nutrition Alteration   Anorexia: 2: Oral intake significantly decreased  Nausea: 0: None  Vomitin: None  Weight: 72 kg (158 lb 12.8 oz)  Pharynx and Esphogaus: 2: Tolerates soft diet  Skin Alteration   Skin Sensation: 0: No problem  Skin Reaction: 3: Dry desquamation with or without erythema  AUA Assessment                                           Accompanied by       Objective:     Exam: moderate Erythema.    Vitals:    24 1403   BP: 109/62   Pulse: 102   Resp: 20   SpO2: 97%   Weight: 72 kg (158 lb 12.8 oz)   PainSc: Moderate Pain (5)   PainLoc: Throat       Wt Readings from Last 8 Encounters:   24 72 kg (158 lb 12.8 oz)   24 74.8 kg (165 lb)   05/15/24 75.2 kg (165 lb 12.8 oz)   24 79.5 kg (175 lb 4 oz)   24 78 kg (172 lb)   24 81.7 kg (180 lb 3.2 oz)   24 80.3 kg (177 lb)   24 80.7 kg (177 lb 14.4 oz)       General: Alert and oriented, in no acute distress  Christopher has moderate Erythema.  Aria chart and setup information reviewed    Joyce Yang MD

## 2024-05-23 NOTE — LETTER
2024         RE: Donovan Yi  [address] [address]        Dear Colleague,    Thank you for referring your patient, Donovan Yi, to the [facility] RADIATION ONCOLOGY [place]. Please see a copy of my visit note below.    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  All questions and concerns addressed.    Plan:     Continue radiation treatment as prescribed.    His pain is manageable with pain medication.     Discussed with patient about appropriate nutritional support during the therapy.  We will arrange patient to have daily IV fluid during the remaining course of radiation therapy.     We will continue monitor patient closely.     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    Assessment                  Body Site:  Head and Neck Site: BOT  Stereotactic Radiosurgery: No  Concurrent Therapy: Yes (weekly cisplatin)  Today's Dose: 6000  Total Dose for Head and Neck: 7000  Today's Fraction/Total Fraction Head and Neck: 30/35  Mucositis due to radiation: 2: Patchy pseudomembranous reaction (patches generally less than or equal to 1.5 cm in diameter and noncontiguous)  Thrush: 0: Absent  Pharynx and Esphogaus: 2: Tolerates soft diet  Voice Chances/Stridor/Larynx: 0: Normal  Ocular/Visual - other : 1: Mild  Middle ear/hearin: Normal  Tinnitus: 0: No  Cough: 0: Absent                                   Sexuality Alteration                    Emotional Alteration     Copin: Ineffective  Comfort Alteration   KPS: 90% Can perform normal activity, minor signs of disease  Fatigue (ONS scale): 7: Extreme Fatigue  Pain Location: mouth/throat  Pain Intensity. Rate degree of pain ranging from 0 (no pain) to 10 (severe pain): 5  Pain Description: Ache - Muscular type ache  Pain Intervention: 3: Opiods (hydrocodone, gabapentin, belbuca, magic mouthwash)  Effectiveness of pain intervention: 2: Pain relieved 50%   Nutrition Alteration   Anorexia: 2: Oral intake significantly decreased  Nausea: 0: None  Vomitin: None  Weight: 72 kg (158 lb 12.8 oz)  Pharynx and Esphogaus: 2: Tolerates soft diet  Skin Alteration   Skin Sensation: 0: No problem  Skin Reaction: 3: Dry desquamation with or without erythema  AUA Assessment                                           Accompanied by       Objective:     Exam: moderate Erythema.    Vitals:    24 1403   BP: 109/62   Pulse: 102   Resp: 20   SpO2: 97%   Weight: 72 kg (158 lb 12.8 oz)   PainSc: Moderate Pain (5)   PainLoc: Throat       Wt Readings from Last 8 Encounters:   24 72 kg (158 lb 12.8 oz)   24 74.8 kg (165 lb)   05/15/24 75.2 kg (165 lb 12.8 oz)   24 79.5 kg (175 lb 4 oz)   24 78 kg (172 lb)   24 81.7 kg (180 lb 3.2 oz)   24 80.3 kg (177 lb)   24 80.7 kg (177 lb 14.4 oz)       General: Alert and oriented, in no acute distress  Christopher has moderate Erythema.  Aria chart and setup information reviewed    Joyce Yang MD      Again, thank you for allowing me to participate in the care of your patient.        Sincerely,        Joyce Yang MD

## 2024-05-24 ENCOUNTER — PATIENT OUTREACH (OUTPATIENT)
Dept: ONCOLOGY | Facility: HOSPITAL | Age: 74
End: 2024-05-24
Payer: MEDICARE

## 2024-05-24 ENCOUNTER — MYC MEDICAL ADVICE (OUTPATIENT)
Dept: ONCOLOGY | Facility: HOSPITAL | Age: 74
End: 2024-05-24
Payer: MEDICARE

## 2024-05-24 ENCOUNTER — MYC REFILL (OUTPATIENT)
Dept: ONCOLOGY | Facility: HOSPITAL | Age: 74
End: 2024-05-24
Payer: MEDICARE

## 2024-05-24 ENCOUNTER — APPOINTMENT (OUTPATIENT)
Dept: RADIATION ONCOLOGY | Facility: CLINIC | Age: 74
End: 2024-05-24
Attending: RADIOLOGY
Payer: MEDICARE

## 2024-05-24 DIAGNOSIS — C01 PRIMARY SQUAMOUS CELL CARCINOMA OF BASE OF TONGUE (H): ICD-10-CM

## 2024-05-24 DIAGNOSIS — K12.31 MUCOSITIS DUE TO CHEMOTHERAPY: ICD-10-CM

## 2024-05-24 PROCEDURE — 77014 PR CT GUIDE FOR PLACEMENT RADIATION THERAPY FIELDS: CPT | Mod: 26 | Performed by: RADIOLOGY

## 2024-05-24 PROCEDURE — 77386 HC IMRT TREATMENT DELIVERY, COMPLEX: CPT | Performed by: STUDENT IN AN ORGANIZED HEALTH CARE EDUCATION/TRAINING PROGRAM

## 2024-05-24 RX ORDER — MORPHINE SULFATE 10 MG/5ML
10-20 SOLUTION ORAL EVERY 4 HOURS PRN
Qty: 450 ML | Refills: 0 | Status: SHIPPED | OUTPATIENT
Start: 2024-05-24 | End: 2024-06-03

## 2024-05-24 NOTE — PROGRESS NOTES
Phani's wife Karla call to nurse triage line. Karla reports that Dr. Yang stated Russelopher should have supportive fluids for duration of treatment. Per Karla, Dr. Aldana was to manage these fluids. Karla reports she is unable to schedule appointment as they are booked out a few weeks. Message forwarded to [facility] Onc support pool, Dr. Aldana and Sindy SIMOEN.    Maisha Mcgregor RN  05/24/24  1:27 PM

## 2024-05-24 NOTE — TELEPHONE ENCOUNTER
Morphine 10 mg/5 ml   Directions:Take 5-10 mLs (10-20 mg) by mouth every 4 hours as needed for pain   sent from E prescribing interface     Last Written Prescription Date:  5/14/24, filled by Dr. Aldana  Last Fill Quantity: 450 mL,  # refills: 0   Last office visit provider:  5/6/14 with Jovan  Next visit scheduled on 5/29/24 with Dr. Trey Mcgregor, RN   05/24/24 1:40 PM

## 2024-05-28 ENCOUNTER — PATIENT OUTREACH (OUTPATIENT)
Dept: ONCOLOGY | Facility: HOSPITAL | Age: 74
End: 2024-05-28
Payer: MEDICARE

## 2024-05-28 ENCOUNTER — MYC REFILL (OUTPATIENT)
Dept: ONCOLOGY | Facility: HOSPITAL | Age: 74
End: 2024-05-28
Payer: MEDICARE

## 2024-05-28 ENCOUNTER — APPOINTMENT (OUTPATIENT)
Dept: RADIATION ONCOLOGY | Facility: CLINIC | Age: 74
End: 2024-05-28
Attending: RADIOLOGY
Payer: MEDICARE

## 2024-05-28 DIAGNOSIS — E86.0 DEHYDRATION: Primary | ICD-10-CM

## 2024-05-28 DIAGNOSIS — C01 PRIMARY SQUAMOUS CELL CARCINOMA OF BASE OF TONGUE (H): ICD-10-CM

## 2024-05-28 DIAGNOSIS — K12.31 MUCOSITIS DUE TO CHEMOTHERAPY: ICD-10-CM

## 2024-05-28 PROCEDURE — 77386 HC IMRT TREATMENT DELIVERY, COMPLEX: CPT

## 2024-05-28 PROCEDURE — 77014 PR CT GUIDE FOR PLACEMENT RADIATION THERAPY FIELDS: CPT | Mod: 26 | Performed by: RADIOLOGY

## 2024-05-28 RX ORDER — HEPARIN SODIUM,PORCINE 10 UNIT/ML
5-20 VIAL (ML) INTRAVENOUS DAILY PRN
Status: CANCELLED | OUTPATIENT
Start: 2024-05-29

## 2024-05-28 RX ORDER — HEPARIN SODIUM (PORCINE) LOCK FLUSH IV SOLN 100 UNIT/ML 100 UNIT/ML
5 SOLUTION INTRAVENOUS
Status: CANCELLED | OUTPATIENT
Start: 2024-05-29

## 2024-05-28 NOTE — PROGRESS NOTES
[facility]: Cancer Care                                                                                          Responded to patient's spouse mychart regarding needing IV fluids this week due to patient losing weight.     Signature:  Sindy Ortiz RN

## 2024-05-28 NOTE — TELEPHONE ENCOUNTER
Last Written Prescription Date:  5/22/24  Last Fill Quantity: 480,  # refills: 1   Last office visit provider:  5/6/24 with Dr. Aldana    Requested Prescriptions   Pending Prescriptions Disp Refills    magic mouthwash suspension (diphenhydrAMINE, lidocaine, aluminum-magnesium & simethicone) 480 mL 1     Sig: Swish and spit 15 mLs in mouth every 2 hours as needed for mouth sores       There is no refill protocol information for this order          Anju Jara RN 05/28/24 12:30 PM

## 2024-05-28 NOTE — PROGRESS NOTES
"[facility]: Cancer Care                                                                                          Sent patient a reply:    \"We were able to get you in for an appointment to receive fluids at [facility] tomorrow at 8:30am. You will check in on the first floor of the cancer clinic. It is on the same day as your radiation appointment at [facility]. Radiation will be aware that you have an infusion appointment at [facility]. Please let me know if you have any additional questions. Thank you for your patience and flexibility.      Signature:  Sindy Ortiz RN  "

## 2024-05-29 ENCOUNTER — OFFICE VISIT (OUTPATIENT)
Dept: RADIATION ONCOLOGY | Facility: CLINIC | Age: 74
End: 2024-05-29
Attending: RADIOLOGY
Payer: MEDICARE

## 2024-05-29 ENCOUNTER — INFUSION THERAPY VISIT (OUTPATIENT)
Dept: INFUSION THERAPY | Facility: HOSPITAL | Age: 74
End: 2024-05-29
Attending: INTERNAL MEDICINE
Payer: MEDICARE

## 2024-05-29 VITALS
OXYGEN SATURATION: 98 % | HEART RATE: 70 BPM | DIASTOLIC BLOOD PRESSURE: 61 MMHG | RESPIRATION RATE: 16 BRPM | TEMPERATURE: 98.6 F | SYSTOLIC BLOOD PRESSURE: 126 MMHG

## 2024-05-29 VITALS
RESPIRATION RATE: 18 BRPM | HEART RATE: 87 BPM | TEMPERATURE: 98.6 F | SYSTOLIC BLOOD PRESSURE: 112 MMHG | OXYGEN SATURATION: 97 % | BODY MASS INDEX: 23.1 KG/M2 | WEIGHT: 161 LBS | DIASTOLIC BLOOD PRESSURE: 56 MMHG

## 2024-05-29 DIAGNOSIS — E86.0 DEHYDRATION: ICD-10-CM

## 2024-05-29 DIAGNOSIS — C01 PRIMARY SQUAMOUS CELL CARCINOMA OF BASE OF TONGUE (H): Primary | ICD-10-CM

## 2024-05-29 PROCEDURE — 77386 HC IMRT TREATMENT DELIVERY, COMPLEX: CPT | Performed by: RADIOLOGY

## 2024-05-29 PROCEDURE — 999N000127 HC STATISTIC PERIPHERAL IV START W US GUIDANCE

## 2024-05-29 PROCEDURE — 258N000003 HC RX IP 258 OP 636: Performed by: INTERNAL MEDICINE

## 2024-05-29 PROCEDURE — 77014 PR CT GUIDE FOR PLACEMENT RADIATION THERAPY FIELDS: CPT | Mod: 26 | Performed by: RADIOLOGY

## 2024-05-29 PROCEDURE — 96360 HYDRATION IV INFUSION INIT: CPT

## 2024-05-29 RX ORDER — HEPARIN SODIUM (PORCINE) LOCK FLUSH IV SOLN 100 UNIT/ML 100 UNIT/ML
5 SOLUTION INTRAVENOUS
Status: DISCONTINUED | OUTPATIENT
Start: 2024-05-29 | End: 2024-05-29 | Stop reason: HOSPADM

## 2024-05-29 RX ORDER — HEPARIN SODIUM,PORCINE 10 UNIT/ML
5-20 VIAL (ML) INTRAVENOUS DAILY PRN
Status: CANCELLED | OUTPATIENT
Start: 2024-05-29

## 2024-05-29 RX ORDER — HEPARIN SODIUM (PORCINE) LOCK FLUSH IV SOLN 100 UNIT/ML 100 UNIT/ML
5 SOLUTION INTRAVENOUS
Status: CANCELLED | OUTPATIENT
Start: 2024-05-29

## 2024-05-29 RX ORDER — HEPARIN SODIUM,PORCINE 10 UNIT/ML
5-20 VIAL (ML) INTRAVENOUS DAILY PRN
Status: DISCONTINUED | OUTPATIENT
Start: 2024-05-29 | End: 2024-05-29 | Stop reason: HOSPADM

## 2024-05-29 RX ADMIN — SODIUM CHLORIDE 1000 ML: 9 INJECTION, SOLUTION INTRAVENOUS at 13:20

## 2024-05-29 ASSESSMENT — PAIN SCALES - GENERAL
PAINLEVEL: SEVERE PAIN (6)
PAINLEVEL: SEVERE PAIN (6)

## 2024-05-29 NOTE — PROGRESS NOTES
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  All questions and concerns addressed.    Plan:     Continue radiation treatment as prescribed.    His pain is manageable with pain medication.     Discussed with patient about appropriate nutritional support during the therapy.  The patient is currently getting IV fluid daily as needed.       We will continue monitor patient closely.    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    Assessment                  Body Site:  Head and Neck Site: BOT  Stereotactic Radiosurgery: No  Concurrent Therapy: Yes (weekly cisplatin)  Today's Dose: 6600  Total Dose for Head and Neck: 7000  Today's Fraction/Total Fraction Head and Neck: 33/35  Mucositis due to radiation: 2: Patchy pseudomembranous reaction (patches generally less than or equal to 1.5 cm in diameter and noncontiguous)  Thrush: 1: Present (mild white pacthy spots)  Pharynx and Esphogaus: 2: Tolerates soft diet  Voice Chances/Stridor/Larynx: 1: Mild or intermittent hoarseness (pt prefers not to talk/causes more pain with dry mouth)  Ocular/Visual - other : 1: Mild  Middle ear/hearin: Normal  Tinnitus: 0: No  Cough: 0: Absent                                   Sexuality Alteration                    Emotional Alteration    Copin: Ineffective  Comfort Alteration   KPS: 90% Can perform normal activity, minor signs of disease  Fatigue (ONS scale): 7: Extreme Fatigue  Pain Location: mouth/throat  Pain Intensity. Rate degree  of pain ranging from 0 (no pain) to 10 (severe pain): 6  Pain Description: Ache - Muscular type ache (dry)  Pain Intervention: 3: Opiods (hydrocodone, gabapentin, belbuca, magic mouthwash)  Effectiveness of pain intervention: 2: Pain relieved 50%;3: Pain relieved 75%   Nutrition Alteration   Anorexia: 2: Oral intake significantly decreased  Nausea: 0: None  Vomitin: None  Weight: 73 kg (161 lb)  Pharynx and Esphogaus: 2: Tolerates soft diet  Skin Alteration   Skin Sensation: 0: No problem  Skin Reaction: 3: Dry desquamation with or without erythema  AUA Assessment                                           Accompanied by       Objective:     Exam: moderate Erythema with no evidence of fungal infection.    Vitals:    24 1159   BP: 112/56   Pulse: 87   Resp: 18   Temp: 98.6  F (37  C)   TempSrc: Oral   SpO2: 97%   Weight: 73 kg (161 lb)   PainSc: Severe Pain (6)       Wt Readings from Last 8 Encounters:   24 73 kg (161 lb)   24 72 kg (158 lb 12.8 oz)   24 74.8 kg (165 lb)   05/15/24 75.2 kg (165 lb 12.8 oz)   24 79.5 kg (175 lb 4 oz)   24 78 kg (172 lb)   24 81.7 kg (180 lb 3.2 oz)   24 80.3 kg (177 lb)       General: Alert and oriented, in no acute distress  Christopher has moderate Erythema.  Aria chart and setup information reviewed    Joyce Yang MD

## 2024-05-29 NOTE — LETTER
2024         RE: Donovan Yi  [address] [address]        Dear Colleague,    Thank you for referring your patient, Donovan Yi, to the [facility] RADIATION ONCOLOGY [place]. Please see a copy of my visit note below.    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  All questions and concerns addressed.    Plan:     Continue radiation treatment as prescribed.    His pain is manageable with pain medication.     Discussed with patient about appropriate nutritional support during the therapy.  The patient is currently getting IV fluid daily as needed.       We will continue monitor patient closely.    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    Assessment                  Body Site:  Head and Neck Site: BOT  Stereotactic Radiosurgery: No  Concurrent Therapy: Yes (weekly cisplatin)  Today's Dose: 6600  Total Dose for Head and Neck: 7000  Today's Fraction/Total Fraction Head and Neck: 33/35  Mucositis due to radiation: 2: Patchy pseudomembranous reaction (patches generally less than or equal to 1.5 cm in diameter and noncontiguous)  Thrush: 1: Present (mild white pacthy spots)  Pharynx and Esphogaus: 2: Tolerates soft diet  Voice Chances/Stridor/Larynx: 1: Mild or intermittent hoarseness (pt prefers not to talk/causes more pain with dry mouth)  Ocular/Visual - other : 1: Mild  Middle ear/hearin: Normal  Tinnitus: 0: No  Cough: 0: Absent                                    Sexuality Alteration                    Emotional Alteration    Copin: Ineffective  Comfort Alteration   KPS: 90% Can perform normal activity, minor signs of disease  Fatigue (ONS scale): 7: Extreme Fatigue  Pain Location: mouth/throat  Pain Intensity. Rate degree of pain ranging from 0 (no pain) to 10 (severe pain): 6  Pain Description: Ache - Muscular type ache (dry)  Pain Intervention: 3: Opiods (hydrocodone, gabapentin, belbuca, magic mouthwash)  Effectiveness of pain intervention: 2: Pain relieved 50%;3: Pain relieved 75%   Nutrition Alteration   Anorexia: 2: Oral intake significantly decreased  Nausea: 0: None  Vomitin: None  Weight: 73 kg (161 lb)  Pharynx and Esphogaus: 2: Tolerates soft diet  Skin Alteration   Skin Sensation: 0: No problem  Skin Reaction: 3: Dry desquamation with or without erythema  AUA Assessment                                           Accompanied by       Objective:     Exam: moderate Erythema with no evidence of fungal infection.    Vitals:    24 1159   BP: 112/56   Pulse: 87   Resp: 18   Temp: 98.6  F (37  C)   TempSrc: Oral   SpO2: 97%   Weight: 73 kg (161 lb)   PainSc: Severe Pain (6)       Wt Readings from Last 8 Encounters:   24 73 kg (161 lb)   24 72 kg (158 lb 12.8 oz)   24 74.8 kg (165 lb)   05/15/24 75.2 kg (165 lb 12.8 oz)   24 79.5 kg (175 lb 4 oz)   24 78 kg (172 lb)   24 81.7 kg (180 lb 3.2 oz)   24 80.3 kg (177 lb)       General: Alert and oriented, in no acute distress  Christopher has moderate Erythema.  Aria chart and setup information reviewed    Joyce Yang MD      Again, thank you for allowing me to participate in the care of your patient.        Sincerely,        Joyce Yang MD

## 2024-05-29 NOTE — PROGRESS NOTES
Infusion Nursing Note:  Donovan Yi presents today for IVF  Patient seen by provider today: No   present during visit today: Not Applicable.    Note: Tolerated treatment well today, offers no complaints, ambulated to home with wife at discharge.      Intravenous Access:  Peripheral IV placed by PICC RN    Treatment Conditions:  IVF's given    Post Infusion Assessment:  Patient tolerated infusion without incident.  Blood return noted pre and post infusion.  No evidence of extravasations.  Access discontinued per protocol.       Discharge Plan:   Patient and/or family verbalized understanding of discharge instructions and all questions answered    Kayleen Weller RN

## 2024-05-30 ENCOUNTER — APPOINTMENT (OUTPATIENT)
Dept: RADIATION ONCOLOGY | Facility: CLINIC | Age: 74
End: 2024-05-30
Attending: STUDENT IN AN ORGANIZED HEALTH CARE EDUCATION/TRAINING PROGRAM
Payer: MEDICARE

## 2024-05-30 PROCEDURE — 77386 HC IMRT TREATMENT DELIVERY, COMPLEX: CPT | Performed by: STUDENT IN AN ORGANIZED HEALTH CARE EDUCATION/TRAINING PROGRAM

## 2024-05-30 PROCEDURE — 77014 PR CT GUIDE FOR PLACEMENT RADIATION THERAPY FIELDS: CPT | Mod: 26 | Performed by: STUDENT IN AN ORGANIZED HEALTH CARE EDUCATION/TRAINING PROGRAM

## 2024-05-31 ENCOUNTER — APPOINTMENT (OUTPATIENT)
Dept: RADIATION ONCOLOGY | Facility: CLINIC | Age: 74
End: 2024-05-31
Attending: RADIOLOGY
Payer: MEDICARE

## 2024-05-31 PROCEDURE — 77336 RADIATION PHYSICS CONSULT: CPT | Performed by: RADIOLOGY

## 2024-05-31 PROCEDURE — 77427 RADIATION TX MANAGEMENT X5: CPT | Performed by: RADIOLOGY

## 2024-05-31 PROCEDURE — 77014 PR CT GUIDE FOR PLACEMENT RADIATION THERAPY FIELDS: CPT | Mod: 26 | Performed by: STUDENT IN AN ORGANIZED HEALTH CARE EDUCATION/TRAINING PROGRAM

## 2024-05-31 PROCEDURE — 77386 HC IMRT TREATMENT DELIVERY, COMPLEX: CPT | Performed by: STUDENT IN AN ORGANIZED HEALTH CARE EDUCATION/TRAINING PROGRAM

## 2024-05-31 NOTE — PROGRESS NOTES
Radiation Treatment Summary          Patient: Donovan Yi            MRN: [id number]           : 1950        Care Provider: Joyce Yang MD         Date of Service: May 31, 2024        Albert Pimentel MD  [address] [place] [id number]           Dear Dr. Pimentel:     Your patient Mr. Phani Yi completed his radiation therapy on 2024. As you know Mr. 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  The patient received concurrent chemoradiation therapy for his head neck cancer and had radiation therapy in our clinic with a total dose of 7000 cGy in 35 treatments given from 4/3/2024 - 2024.  The patient tolerated radiation therapy reasonably well with expected acute side effect.  He is scheduled return to radiation oncology in 1 week for a routine post therapy office follow-up.  I will continue to monitor patient closely until he is fully recovered from his treatment.    Again, thank you very much for the referral and allowing me to participate in the care of this patient.  If you have any questions or concerns about this patient, please do not hesitate to call.          Sincerely,    Joyce Yang MD, PhD  Department of Radiation Oncology   [facility] Radiation Oncology  Cell: [phone]    [facility]  [address] [place] [address]     [address]  [place] [address]    CC:  Patient Care Team:  Stephany Strickland MD as PCP - General (Pain Medicine)  Joyce Yang MD as MD (Radiation Oncology)  Michael Aldana MD (Internal Medicine)  Albert Pimentel MD as Assigned Surgical Provider  Joyce Yang MD as Assigned Cancer Care Provider  Cheryl Atkins DO as Assigned Palliative Care Provider  Srinivas Escobedo  GERALDINE Carlos as Assigned Behavioral Health Provider  Molly Nesbitt, APRN CNP as Assigned PCP

## 2024-05-31 NOTE — PROGRESS NOTES
Patient here ambulatory accompanied by wife for final radiation therapy treatment for his head neck cancer.  Reinforced skin cares and instructed patient to continue with his current regimen of skin and nutrition for the next 3 weeks minimum.  Written discharge instructions reviewed and given to patient.  Follow-up with Dr. Yang in about 1 week.  Patient does have appointment scheduled for [facility] and was given the phone number to call should he need to make any adjustments or changes to his schedule.  Patient left ambulatory with wife.

## 2024-06-01 ENCOUNTER — HEALTH MAINTENANCE LETTER (OUTPATIENT)
Age: 74
End: 2024-06-01

## 2024-06-02 ENCOUNTER — MYC REFILL (OUTPATIENT)
Dept: ONCOLOGY | Facility: HOSPITAL | Age: 74
End: 2024-06-02
Payer: MEDICARE

## 2024-06-02 DIAGNOSIS — K12.31 MUCOSITIS DUE TO CHEMOTHERAPY: ICD-10-CM

## 2024-06-03 ENCOUNTER — MYC REFILL (OUTPATIENT)
Dept: ONCOLOGY | Facility: HOSPITAL | Age: 74
End: 2024-06-03
Payer: MEDICARE

## 2024-06-03 ENCOUNTER — TRANSFERRED RECORDS (OUTPATIENT)
Dept: HEALTH INFORMATION MANAGEMENT | Facility: CLINIC | Age: 74
End: 2024-06-03
Payer: MEDICARE

## 2024-06-03 DIAGNOSIS — K12.31 MUCOSITIS DUE TO CHEMOTHERAPY: ICD-10-CM

## 2024-06-03 DIAGNOSIS — C01 PRIMARY SQUAMOUS CELL CARCINOMA OF BASE OF TONGUE (H): ICD-10-CM

## 2024-06-03 RX ORDER — MORPHINE SULFATE 10 MG/5ML
10-20 SOLUTION ORAL EVERY 4 HOURS PRN
Qty: 450 ML | Refills: 0 | Status: SHIPPED | OUTPATIENT
Start: 2024-06-03 | End: 2024-06-13

## 2024-06-03 NOTE — TELEPHONE ENCOUNTER
Patient reports that he is using mouthwash about every 3 hours, 8 times a day. So a bottle will last 4 days.    Last Written Prescription Date:  5/28/24  Last Fill Quantity: 480 ml,  # refills: 1   Last office visit provider:  5/6/24 with Dr. Aldana     Requested Prescriptions   Pending Prescriptions Disp Refills    magic mouthwash suspension (diphenhydrAMINE, lidocaine, aluminum-magnesium & simethicone) 480 mL 1     Sig: Swish and spit 15 mLs in mouth every 2 hours as needed for mouth sores       There is no refill protocol information for this order          Anju Jara RN 06/03/24 11:57 AM

## 2024-06-05 ENCOUNTER — MYC REFILL (OUTPATIENT)
Dept: ONCOLOGY | Facility: HOSPITAL | Age: 74
End: 2024-06-05
Payer: MEDICARE

## 2024-06-05 DIAGNOSIS — K12.31 MUCOSITIS DUE TO CHEMOTHERAPY: ICD-10-CM

## 2024-06-06 ENCOUNTER — MYC MEDICAL ADVICE (OUTPATIENT)
Dept: ONCOLOGY | Facility: HOSPITAL | Age: 74
End: 2024-06-06

## 2024-06-06 ENCOUNTER — OFFICE VISIT (OUTPATIENT)
Dept: RADIATION ONCOLOGY | Facility: HOSPITAL | Age: 74
End: 2024-06-06
Attending: RADIOLOGY
Payer: MEDICARE

## 2024-06-06 VITALS
BODY MASS INDEX: 22.5 KG/M2 | SYSTOLIC BLOOD PRESSURE: 147 MMHG | RESPIRATION RATE: 18 BRPM | OXYGEN SATURATION: 99 % | DIASTOLIC BLOOD PRESSURE: 77 MMHG | WEIGHT: 156.8 LBS | HEART RATE: 92 BPM

## 2024-06-06 DIAGNOSIS — C01 MALIGNANT NEOPLASM OF BASE OF TONGUE (H): ICD-10-CM

## 2024-06-06 DIAGNOSIS — C01 PRIMARY SQUAMOUS CELL CARCINOMA OF BASE OF TONGUE (H): Primary | ICD-10-CM

## 2024-06-06 PROCEDURE — G0463 HOSPITAL OUTPT CLINIC VISIT: HCPCS | Performed by: RADIOLOGY

## 2024-06-06 ASSESSMENT — PAIN SCALES - GENERAL: PAINLEVEL: MODERATE PAIN (5)

## 2024-06-06 NOTE — LETTER
6/6/2024      Donovan Yi  [address] [address]      Dear Colleague,    Thank you for referring your patient, Donovan Yi, to the [facility] RADIATION ONCOLOGY [place]. Please see a copy of my visit note below.    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       TYPE OF RADIATION THERAPY ADMINISTERED:  Concurrent chemoradiation therapy for his head neck cancer and had radiation therapy in our clinic with a total dose of 7000 cGy in 35 treatments given from 4/3/2024 - 5/31/2024.      INTERVAL SINCE COMPLETION OF RADIATION THERAPY: 1 week.      SUBJECTIVE:  Mr. Yi is a 74 year old male who is a former smoker 1 pack a day for 16 years and quit smoking since 1984.  Patient presented with recent history of some pain and discomfort related to this and thinks that has been present for about a month. He denies any otalgia or tongue numbness.  He was found to have a lesion on the left side of the tongue.  He does not have a dyne aphasia or dysphagia. He has not had any hoarseness and has not noticed any lumps or bumps in his neck.  ENT examination revealed an obvious mass pushing forward into the junctional tongue and tongue base. It has a large crater in the center with some raised tissue laterally and looks endophytic into the left tonsillar fossa.  There is no palpable lymphadenopathy.  Biopsy was obtained on 2/28/2024 with pathology showed p16 positive squamous cell carcinoma.  Patient had a staging PET CT scan on 3/6/2024 which showed FDG avid 2.7 x 2.6 x 4.4 cm mass in the left base of tongue extending inferiorly into the vallecula compatible with biopsy-proven squamous  cell carcinoma.  There is no evidence of lymph nodes and systemic metastasis.  The case has been reviewed on ENT tumor conference of [facility].  The tumor was staged T3 N0 M0 and the consensus recommendation is to consider concurrent chemoradiation therapy.  He is referred to radiation oncology for evaluation and consideration of definitive radiation therapy.  Patient had a 25 years history of not able to tolerate most of food which caused abdominal pain and diarrhea.  He is only able to get pancakes down without any significant difficulty.  Patient has been seen numerous GI specialist without any significant improvement. The patient received concurrent chemoradiation therapy for his head neck cancer and had radiation therapy in our clinic with a total dose of 7000 cGy in 35 treatments given from 4/3/2024 - 5/31/2024. The patient tolerated radiation therapy reasonably well with expected acute side effect.     The patient has been stable since completion of the radiation therapy.  He still have significant amount of moderate pain and dysphagia at the time of evaluation.  Patient is currently taking pain medication with moderate relief.  He is able to get adequate calories and fluid at this time.  Patient otherwise denies any other ongoing issues and is here for routine post therapy office follow-up.    Medications were reviewed and are up to date on EPIC.    The following portions of the patient's history were reviewed and updated as appropriate: allergies, current medications, past family history, past medical history, past social history, past surgical history and problem list.    Review of Systems:      General  Constitutional  Constitutional (WDL): Exceptions to WDL  Fatigue: Fatigue not relieved by rest OR limiting instrumental ADL  Weight Loss: 10 - less than 20% from baseline OR nutritional support indicated  EENT  Eye Disorders  Eye Disorder (WDL): Exceptions to WDL  Blurred Vision: Intervention  not indicated  Ear Disorders  Ear Disorder (WDL): Exceptions to WDL  Tinnitus: Mild symptoms OR intervention not indicated  Respiratory  Respiratory  Respiratory (WDL): All respiratory elements are within defined limits  Cardiovascular  Cardiovascular  Cardiovascular (WDL): All cardiovascular elements are within defined limits  Gastrointestinal  Gastrointestinal  Gastrointestinal (WDL): Exceptions to WDL  Anorexia: Loss of appetite without alteration in eating habits  Dysgeusia: Altered taste with change in diet (e.g., oral supplements) OR noxious or unpleasant taste OR loss of taste  Dysphagia: Symptomatic and altered eating/swallowing  Mucositis Oral: Asymptomatic or mild symptoms OR intervention not indicated  Dry Mouth: Moderate symptoms OR oral intake alterations (e.g., copious water, other lubricants, diet limited to purees and/or soft, moist foods) OR unstimulated saliva 0.1 to 0.2 mL/min  Musculoskeletal  Musculoskeletal and Connective Tissue Disorders  Musculoskeletal & Connective (WDL): Exceptions to WDL  Arthralgia: Mild pain (back pain)  Integumentary  Integumentary  Integumentary (WDL): All integumentary elements are within defined limits  Neurological  Neurosensory  Neurosensory (WDL): Exceptions to WDL  Dizziness: Mild unsteadiness or sensation of movement  Genitourinary/Reproductive  Genitourinary  Genitourinary (WDL): Exceptions to WDL  Urinary Frequency: Present  Lymphatic  Lymph System Disorders  Lymph (WDL): All lymph elements are within defined limits  Pain  Pain Score: Moderate Pain (5)  AUA Assessment                                                              Accompanied by  Accompanied By: spouse    Objective:      PHYSICAL EXAMINATION:    BP (!) 147/77   Pulse 92   Resp 18   Wt 71.1 kg (156 lb 12.8 oz)   SpO2 99%   BMI 22.50 kg/m      Gen: Alert, in NAD  Eyes: PERRL, EOMI, sclera anicteric  HENT     Head: NC/AT     Ears: No external auricular lesions     Nose/sinus: No rhinorrhea or  epistaxis     Oropharynx:  The oral mucosa showed post radiation therapy changes with no evidence of fungal infection.  Neck: Supple, full ROM, no LAD  Pulm: No wheezing, stridor or respiratory distress  CV: Well-perfused, no cyanosis, no pedal edema  Back: No step-offs or pain to palpation along the thoracolumbar spine  Rectal: Deferred  : Deferred  Musculoskeletal: Normal muscle bulk and tone  Skin: Normal color and turgor  Neurologic: A/Ox3, CN II-XII intact, normal gait and station  Psychiatric: Appropriate mood and affect      Impression     73 year old male with a diagnosis of squamous cell carcinoma involving left base of tongue, clinical stage T3 N0 M0, p16 positive, staging workup including PET/CT scan showed no evidence of lymph nodes and systemic metastasis.  The patient received concurrent chemoradiation therapy for his head neck cancer and had radiation therapy in our clinic with a total dose of 7000 cGy in 35 treatments given from 4/3/2024 - 5/31/2024.  The patient tolerated radiation therapy reasonably well with expected acute side effect.      Assessment & Plan:     1.  The patient has been stable since radiation therapy with moderate sore mouth and dysphagia.  He has been taking pain medication with moderate relief.  Discussed with the patient about appropriate nutritional support after therapy.  He is encouraged to increase his daily calorie and fluid intake if it is all possible.  Patient is also informed to contact radiation oncology and set up time for IV fluid if he does not have adequate intake.    2.  Follow-up with radiation oncology in 1 week.    3.  Continue follow-up with Dr. Aldana, medical oncology and Loren De Los Santos, ENT as planned.    Face to face time  15 minutes with > 80% spent on consultation, education and coordination of care.    Joyce Yang MD  Department of Radiation Oncology   [facility] Radiation Oncology  Tel: [phone]  Page: [phone]    [facility]  "[facility]  [address] [place] [address]     [facility]   [address]   [place] [address]    CC:  Patient Care Team:  Stephany Strickland MD as PCP - General (Pain Medicine)  Joyce Yang MD as MD (Radiation Oncology)  Michael Aldana MD (Internal Medicine)  Albert Pimentel MD as Assigned Surgical Provider  Joyce Yang MD as Assigned Cancer Care Provider  Cheryl Atkins DO as Assigned Palliative Care Provider  Srinivas Escobedo LP as Assigned Behavioral Health Provider  Molly Nesbitt APRN CNP as Assigned PCP      Oncology Rooming Note    June 6, 2024 3:59 PM   Donovan Yi is a 74 year old male who presents for:    Chief Complaint   Patient presents with     Oncology Clinic Visit     Head and neck cancer follow up with RO     Initial Vitals: BP (!) 147/77   Pulse 92   Resp 18   Wt 71.1 kg (156 lb 12.8 oz)   SpO2 99%   BMI 22.50 kg/m   Estimated body mass index is 22.5 kg/m  as calculated from the following:    Height as of 5/20/24: 1.778 m (5' 10\").    Weight as of this encounter: 71.1 kg (156 lb 12.8 oz). Body surface area is 1.87 meters squared.  Moderate Pain (5) Comment: Data Unavailable   No LMP for male patient.  Allergies reviewed: Yes  Medications reviewed: Yes    Medications: Medication refills not needed today.  Pharmacy name entered into [facility]:    [id number] - [place], [place] - [address] [id number] [place], [place] - [address] AT [place] [address]    Frailty Screening:   Is the patient here for a new oncology consult visit in cancer care? 2. No      Clinical concerns: tongue pain, states it's raw. VSS. Pt drinking lots of water and only eating pudding and yogurt.   Dr. Yang was notified.      Laura Hill RN                Again, thank you for allowing me to participate in the care of your patient.        Sincerely,        Joyce Yang MD  "

## 2024-06-06 NOTE — PROGRESS NOTES
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       TYPE OF RADIATION THERAPY ADMINISTERED:  Concurrent chemoradiation therapy for his head neck cancer and had radiation therapy in our clinic with a total dose of 7000 cGy in 35 treatments given from 4/3/2024 - 5/31/2024.      INTERVAL SINCE COMPLETION OF RADIATION THERAPY: 1 week.      SUBJECTIVE:  Mr. Yi is a 74 year old male who is a former smoker 1 pack a day for 16 years and quit smoking since 1984.  Patient presented with recent history of some pain and discomfort related to this and thinks that has been present for about a month. He denies any otalgia or tongue numbness.  He was found to have a lesion on the left side of the tongue.  He does not have a dyne aphasia or dysphagia. He has not had any hoarseness and has not noticed any lumps or bumps in his neck.  ENT examination revealed an obvious mass pushing forward into the junctional tongue and tongue base. It has a large crater in the center with some raised tissue laterally and looks endophytic into the left tonsillar fossa.  There is no palpable lymphadenopathy.  Biopsy was obtained on 2/28/2024 with pathology showed p16 positive squamous cell carcinoma.  Patient had a staging PET CT scan on 3/6/2024 which showed FDG avid 2.7 x 2.6 x 4.4 cm mass in the left base of tongue extending inferiorly into the vallecula compatible with biopsy-proven squamous cell carcinoma.  There is no evidence of lymph nodes and systemic metastasis.  The case has been reviewed on ENT tumor conference of [facility].  The tumor was staged T3 N0 M0 and the consensus recommendation is to consider concurrent chemoradiation  therapy.  He is referred to radiation oncology for evaluation and consideration of definitive radiation therapy.  Patient had a 25 years history of not able to tolerate most of food which caused abdominal pain and diarrhea.  He is only able to get pancakes down without any significant difficulty.  Patient has been seen numerous GI specialist without any significant improvement. The patient received concurrent chemoradiation therapy for his head neck cancer and had radiation therapy in our clinic with a total dose of 7000 cGy in 35 treatments given from 4/3/2024 - 5/31/2024. The patient tolerated radiation therapy reasonably well with expected acute side effect.     The patient has been stable since completion of the radiation therapy.  He still have significant amount of moderate pain and dysphagia at the time of evaluation.  Patient is currently taking pain medication with moderate relief.  He is able to get adequate calories and fluid at this time.  Patient otherwise denies any other ongoing issues and is here for routine post therapy office follow-up.    Medications were reviewed and are up to date on EPIC.    The following portions of the patient's history were reviewed and updated as appropriate: allergies, current medications, past family history, past medical history, past social history, past surgical history and problem list.    Review of Systems:      General  Constitutional  Constitutional (WDL): Exceptions to WDL  Fatigue: Fatigue not relieved by rest OR limiting instrumental ADL  Weight Loss: 10 - less than 20% from baseline OR nutritional support indicated  EENT  Eye Disorders  Eye Disorder (WDL): Exceptions to WDL  Blurred Vision: Intervention not indicated  Ear Disorders  Ear Disorder (WDL): Exceptions to WDL  Tinnitus: Mild symptoms OR intervention not indicated  Respiratory  Respiratory  Respiratory (WDL): All respiratory elements are within defined  limits  Cardiovascular  Cardiovascular  Cardiovascular (WDL): All cardiovascular elements are within defined limits  Gastrointestinal  Gastrointestinal  Gastrointestinal (WDL): Exceptions to WDL  Anorexia: Loss of appetite without alteration in eating habits  Dysgeusia: Altered taste with change in diet (e.g., oral supplements) OR noxious or unpleasant taste OR loss of taste  Dysphagia: Symptomatic and altered eating/swallowing  Mucositis Oral: Asymptomatic or mild symptoms OR intervention not indicated  Dry Mouth: Moderate symptoms OR oral intake alterations (e.g., copious water, other lubricants, diet limited to purees and/or soft, moist foods) OR unstimulated saliva 0.1 to 0.2 mL/min  Musculoskeletal  Musculoskeletal and Connective Tissue Disorders  Musculoskeletal & Connective (WDL): Exceptions to WDL  Arthralgia: Mild pain (back pain)  Integumentary  Integumentary  Integumentary (WDL): All integumentary elements are within defined limits  Neurological  Neurosensory  Neurosensory (WDL): Exceptions to WDL  Dizziness: Mild unsteadiness or sensation of movement  Genitourinary/Reproductive  Genitourinary  Genitourinary (WDL): Exceptions to WDL  Urinary Frequency: Present  Lymphatic  Lymph System Disorders  Lymph (WDL): All lymph elements are within defined limits  Pain  Pain Score: Moderate Pain (5)  AUA Assessment                                                              Accompanied by  Accompanied By: spouse    Objective:      PHYSICAL EXAMINATION:    BP (!) 147/77   Pulse 92   Resp 18   Wt 71.1 kg (156 lb 12.8 oz)   SpO2 99%   BMI 22.50 kg/m      Gen: Alert, in NAD  Eyes: PERRL, EOMI, sclera anicteric  HENT     Head: NC/AT     Ears: No external auricular lesions     Nose/sinus: No rhinorrhea or epistaxis     Oropharynx:  The oral mucosa showed post radiation therapy changes with no evidence of fungal infection.  Neck: Supple, full ROM, no LAD  Pulm: No wheezing, stridor or respiratory distress  CV:  Well-perfused, no cyanosis, no pedal edema  Back: No step-offs or pain to palpation along the thoracolumbar spine  Rectal: Deferred  : Deferred  Musculoskeletal: Normal muscle bulk and tone  Skin: Normal color and turgor  Neurologic: A/Ox3, CN II-XII intact, normal gait and station  Psychiatric: Appropriate mood and affect      Impression     73 year old male with a diagnosis of squamous cell carcinoma involving left base of tongue, clinical stage T3 N0 M0, p16 positive, staging workup including PET/CT scan showed no evidence of lymph nodes and systemic metastasis.  The patient received concurrent chemoradiation therapy for his head neck cancer and had radiation therapy in our clinic with a total dose of 7000 cGy in 35 treatments given from 4/3/2024 - 5/31/2024.  The patient tolerated radiation therapy reasonably well with expected acute side effect.      Assessment & Plan:     1.  The patient has been stable since radiation therapy with moderate sore mouth and dysphagia.  He has been taking pain medication with moderate relief.  Discussed with the patient about appropriate nutritional support after therapy.  He is encouraged to increase his daily calorie and fluid intake if it is all possible.  Patient is also informed to contact radiation oncology and set up time for IV fluid if he does not have adequate intake.    2.  Follow-up with radiation oncology in 1 week.    3.  Continue follow-up with Dr. Aldana, medical oncology and Loren De Los Santos, ENT as planned.    Face to face time  15 minutes with > 80% spent on consultation, education and coordination of care.    Joyce Yang MD  Department of Radiation Oncology   [facility] Radiation Oncology  Tel: [phone]  Page: [phone]    [facility]  [address] [place] [address]     [place] [address] [place] [address]    CC:  Patient Care Team:  Stephany Strickland MD as PCP - General (Pain Medicine)  Joyce Yang MD as MD  (Radiation Oncology)  Michael Aldana MD (Internal Medicine)  Albert Pimentel MD as Assigned Surgical Provider  Joyce Yang MD as Assigned Cancer Care Provider  Cheryl Atkins DO as Assigned Palliative Care Provider  Srinivas Escobedo LP as Assigned Behavioral Health Provider  Molly Nesbitt APRN CNP as Assigned PCP

## 2024-06-06 NOTE — PROGRESS NOTES
"Oncology Rooming Note    June 6, 2024 3:59 PM   Donovan Yi is a 74 year old male who presents for:    Chief Complaint   Patient presents with    Oncology Clinic Visit     Head and neck cancer follow up with RO     Initial Vitals: BP (!) 147/77   Pulse 92   Resp 18   Wt 71.1 kg (156 lb 12.8 oz)   SpO2 99%   BMI 22.50 kg/m   Estimated body mass index is 22.5 kg/m  as calculated from the following:    Height as of 5/20/24: 1.778 m (5' 10\").    Weight as of this encounter: 71.1 kg (156 lb 12.8 oz). Body surface area is 1.87 meters squared.  Moderate Pain (5) Comment: Data Unavailable   No LMP for male patient.  Allergies reviewed: Yes  Medications reviewed: Yes    Medications: Medication refills not needed today.  Pharmacy name entered into [facility]:    [id number] [place], [place] - [address] [id number] [place], [place] - [address] AT [place] [address]    Frailty Screening:   Is the patient here for a new oncology consult visit in cancer care? 2. No      Clinical concerns: tongue pain, states it's raw. VSS. Pt drinking lots of water and only eating pudding and yogurt.   Dr. Yang was notified.      Laura Hill RN              "

## 2024-06-06 NOTE — TELEPHONE ENCOUNTER
Refusing RX request, medication was just sent to the pharmacy and should have refill on file.     Last Written Prescription Date:  6/3/24  Last Fill Quantity: 480,  # refills: 1   Last office visit provider:  5/6/24 with Dr. Aldana     Requested Prescriptions   Pending Prescriptions Disp Refills    magic mouthwash suspension (diphenhydrAMINE, lidocaine, aluminum-magnesium & simethicone) 480 mL 1     Sig: Swish and spit 15 mLs in mouth every 2 hours as needed for mouth sores       There is no refill protocol information for this order          Anju Jara RN 06/06/24 8:08 AM

## 2024-06-07 ENCOUNTER — TELEPHONE (OUTPATIENT)
Dept: OTOLARYNGOLOGY | Facility: CLINIC | Age: 74
End: 2024-06-07
Payer: MEDICARE

## 2024-06-07 NOTE — TELEPHONE ENCOUNTER
Left Voicemail (1st Attempt) for the patient to call back and schedule the following:    Appointment type: Return ENT   Provider: Dr. Pimentel   Return date: 6 weeks  Specialty phone number: [phone]  Additional appointment(s) needed:   Additonal Notes:

## 2024-06-10 ENCOUNTER — TELEPHONE (OUTPATIENT)
Dept: OTOLARYNGOLOGY | Facility: CLINIC | Age: 74
End: 2024-06-10
Payer: MEDICARE

## 2024-06-10 NOTE — TELEPHONE ENCOUNTER
Left Voicemail (2nd Attempt) for the patient to call back and schedule the following:    Appointment type: Return ENT   Provider: Dr. Pimentel  Return date: 6 weeks  Specialty phone number: direct   Additional appointment(s) needed:   Additonal Notes:

## 2024-06-12 ENCOUNTER — MYC REFILL (OUTPATIENT)
Dept: ONCOLOGY | Facility: HOSPITAL | Age: 74
End: 2024-06-12
Payer: MEDICARE

## 2024-06-12 DIAGNOSIS — K12.31 MUCOSITIS DUE TO CHEMOTHERAPY: ICD-10-CM

## 2024-06-12 NOTE — TELEPHONE ENCOUNTER
I rec'd a mychart message regarding the refills available for MMW. Our staff addressed that there are refills on file at [facility], however, pt is still having difficulty garnering a refill. [facility] reports that they filled the rx on 6/3 and 6/10, so are truly out of refills. IVETTE Gil RN, OCN, CBCN

## 2024-06-13 ENCOUNTER — TELEPHONE (OUTPATIENT)
Dept: ONCOLOGY | Facility: HOSPITAL | Age: 74
End: 2024-06-13
Payer: MEDICARE

## 2024-06-13 ENCOUNTER — MYC REFILL (OUTPATIENT)
Dept: ONCOLOGY | Facility: HOSPITAL | Age: 74
End: 2024-06-13

## 2024-06-13 ENCOUNTER — OFFICE VISIT (OUTPATIENT)
Dept: RADIATION ONCOLOGY | Facility: HOSPITAL | Age: 74
End: 2024-06-13
Attending: RADIOLOGY
Payer: MEDICARE

## 2024-06-13 VITALS
HEART RATE: 90 BPM | OXYGEN SATURATION: 98 % | BODY MASS INDEX: 22.33 KG/M2 | RESPIRATION RATE: 20 BRPM | SYSTOLIC BLOOD PRESSURE: 134 MMHG | DIASTOLIC BLOOD PRESSURE: 77 MMHG | WEIGHT: 155.6 LBS

## 2024-06-13 DIAGNOSIS — C01 PRIMARY SQUAMOUS CELL CARCINOMA OF BASE OF TONGUE (H): Primary | ICD-10-CM

## 2024-06-13 DIAGNOSIS — K12.31 MUCOSITIS DUE TO CHEMOTHERAPY: ICD-10-CM

## 2024-06-13 DIAGNOSIS — C01 PRIMARY SQUAMOUS CELL CARCINOMA OF BASE OF TONGUE (H): ICD-10-CM

## 2024-06-13 NOTE — TELEPHONE ENCOUNTER
Patient's wife Karla calls regarding prescription for magic mouthwash. She is wondering if it was sent to the pharmacy. There is a consent to communicate on file for Karla.     Per chart review mouthwash was sent to the pharmacy this morning. Karla reports that it was sent to the wrong pharmacy, should be the [facility] on [address]. Also, upon further review by RN the directions have changed. Will review directions with provider and have RX sent to new pharmacy.    Anju Jara RN

## 2024-06-13 NOTE — PROGRESS NOTES
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       TYPE OF RADIATION THERAPY ADMINISTERED:  Concurrent chemoradiation therapy for his head neck cancer and had radiation therapy in our clinic with a total dose of 7000 cGy in 35 treatments given from 4/3/2024 - 5/31/2024.      INTERVAL SINCE COMPLETION OF RADIATION THERAPY: 2 weeks.      SUBJECTIVE:  Mr. Yi is a 74 year old male who is a former smoker 1 pack a day for 16 years and quit smoking since 1984.  Patient presented with recent history of some pain and discomfort related to this and thinks that has been present for about a month. He denies any otalgia or tongue numbness.  He was found to have a lesion on the left side of the tongue.  He does not have a dyne aphasia or dysphagia. He has not had any hoarseness and has not noticed any lumps or bumps in his neck.  ENT examination revealed an obvious mass pushing forward into the junctional tongue and tongue base. It has a large crater in the center with some raised tissue laterally and looks endophytic into the left tonsillar fossa.  There is no palpable lymphadenopathy.  Biopsy was obtained on 2/28/2024 with pathology showed p16 positive squamous cell carcinoma.  Patient had a staging PET CT scan on 3/6/2024 which showed FDG avid 2.7 x 2.6 x 4.4 cm mass in the left base of tongue extending inferiorly into the vallecula compatible with biopsy-proven squamous cell carcinoma.  There is no evidence of lymph nodes and systemic metastasis.  The case has been reviewed on ENT tumor conference of [facility].  The tumor was staged T3 N0 M0 and the consensus recommendation is to consider concurrent chemoradiation  therapy.  He is referred to radiation oncology for evaluation and consideration of definitive radiation therapy.  Patient had a 25 years history of not able to tolerate most of food which caused abdominal pain and diarrhea.  He is only able to get pancakes down without any significant difficulty.  Patient has been seen numerous GI specialist without any significant improvement. The patient received concurrent chemoradiation therapy for his head neck cancer and had radiation therapy in our clinic with a total dose of 7000 cGy in 35 treatments given from 4/3/2024 - 5/31/2024. The patient tolerated radiation therapy reasonably well with expected acute side effect.      The patient has been stable since completion of the radiation therapy.  He still have moderate pain and mild dysphagia at the time of evaluation.  Patient is currently taking pain medication with moderate relief.  He is able to get adequate calories and fluid at this time.  Patient otherwise denies any other ongoing issues and is here for routine post therapy office follow-up.     Medications were reviewed and are up to date on EPIC.    The following portions of the patient's history were reviewed and updated as appropriate: allergies, current medications, past family history, past medical history, past social history, past surgical history and problem list.    Review of Systems:      General  Constitutional  Constitutional (WDL): Exceptions to WDL  Fatigue: Fatigue relieved by rest  Weight Loss: 5 to less than 10% from baseline OR intervention not indicated  EENT  Eye Disorders  Eye Disorder (WDL): Exceptions to WDL  Blurred Vision: Intervention not indicated  Ear Disorders  Ear Disorder (WDL): Exceptions to WDL  Tinnitus: Mild symptoms OR intervention not indicated  Respiratory  Respiratory  Respiratory (WDL): All respiratory elements are within defined limits  Cardiovascular  Cardiovascular  Cardiovascular (WDL): All cardiovascular elements are within  defined limits  Gastrointestinal  Gastrointestinal  Gastrointestinal (WDL): Exceptions to WDL  Anorexia: Loss of appetite without alteration in eating habits  Dysgeusia: Altered taste with change in diet (e.g., oral supplements) OR noxious or unpleasant taste OR loss of taste  Dysphagia: Symptomatic and altered eating/swallowing  Mucositis Oral: Asymptomatic or mild symptoms OR intervention not indicated  Dry Mouth: Moderate symptoms OR oral intake alterations (e.g., copious water, other lubricants, diet limited to purees and/or soft, moist foods) OR unstimulated saliva 0.1 to 0.2 mL/min  Musculoskeletal  Musculoskeletal and Connective Tissue Disorders  Musculoskeletal & Connective (WDL): Exceptions to WDL  Arthralgia: Mild pain (back pain)  Integumentary  Integumentary  Integumentary (WDL): All integumentary elements are within defined limits  Neurological  Neurosensory  Neurosensory (WDL): Exceptions to WDL  Ataxia: Asymptomatic OR clinical or diagnostic observations only OR intervention not indicated (uses walking stick)  Genitourinary/Reproductive  Genitourinary  Genitourinary (WDL): Exceptions to WDL  Urinary Frequency: Present  Lymphatic  Lymph System Disorders  Lymph (WDL): All lymph elements are within defined limits  Pain  Pain Score: Severe Pain (6)  AUA Assessment                                                              Accompanied by  Accompanied By: spouse    Objective:      PHYSICAL EXAMINATION:    /77 (BP Location: Left arm, Patient Position: Sitting)   Pulse 90   Resp 20   Wt 70.6 kg (155 lb 9.6 oz)   SpO2 98%   BMI 22.33 kg/m      Gen: Alert, in NAD  Eyes: PERRL, EOMI, sclera anicteric  HENT     Head: NC/AT     Ears: No external auricular lesions     Nose/sinus: No rhinorrhea or epistaxis     Oropharynx:  The oral mucosa showed post radiation therapy changes with no evidence of fungal infection.  The oral mucositis has been significant improved since radiation therapy.  Neck: Supple,  full ROM, no LAD  Pulm: No wheezing, stridor or respiratory distress  CV: Well-perfused, no cyanosis, no pedal edema  Back: No step-offs or pain to palpation along the thoracolumbar spine  Rectal: Deferred  : Deferred  Musculoskeletal: Normal muscle bulk and tone  Skin: Normal color and turgor  Neurologic: A/Ox3, CN II-XII intact, normal gait and station  Psychiatric: Appropriate mood and affect      Impression     74 year old male with a diagnosis of squamous cell carcinoma involving left base of tongue, clinical stage T3 N0 M0, p16 positive, staging workup including PET/CT scan showed no evidence of lymph nodes and systemic metastasis.  The patient received concurrent chemoradiation therapy for his head neck cancer and had radiation therapy in our clinic with a total dose of 7000 cGy in 35 treatments given from 4/3/2024 - 5/31/2024.  The patient tolerated radiation therapy reasonably well with expected acute side effect.     Assessment & Plan:     1.  The patient has been stable since radiation therapy with moderate sore mouth and dysphagia.  He has been taking pain medication with moderate relief.  Discussed with the patient about appropriate nutritional support after therapy.  He is encouraged to increase his daily calorie and fluid intake if it is all possible.  Patient is also informed to contact radiation oncology and set up time for IV fluid if he does not have adequate intake.     2.  Follow-up with radiation oncology in 2 weeks.     3.  Continue follow-up with Dr. Aldana, medical oncology and Loren De Los Santos, ENT as planned.     Face to face time  15 minutes with > 80% spent on consultation, education and coordination of care.        Joyce Yang MD  Department of Radiation Oncology   [facility] Radiation Oncology  Tel: [phone]  Page: [phone]    [facility]  [address]  [place] [address]     [place] [id number] [facility] [address] [place] [address]    CC:  Patient Care  Team:  Stephany Strickland MD as PCP - General (Pain Medicine)  Joyce Yang MD as MD (Radiation Oncology)  Michael Aldana MD (Internal Medicine)  Albert Pimentel MD as Assigned Surgical Provider  Joyce Yang MD as Assigned Cancer Care Provider  Cheryl Atkins DO as Assigned Palliative Care Provider  Srinivas Escobedo LP as Assigned Behavioral Health Provider  Molly Nesbitt, HIREN CNP as Assigned PCP

## 2024-06-13 NOTE — LETTER
6/13/2024      Donovan Yi  [address] [address]      Dear Colleague,    Thank you for referring your patient, Donovan Yi, to the [facility] RADIATION [facility]COLOGY [place]. Please see a copy of my visit note below.    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       TYPE OF RADIATION THERAPY ADMINISTERED:  Concurrent chemoradiation therapy for his head neck cancer and had radiation therapy in our clinic with a total dose of 7000 cGy in 35 treatments given from 4/3/2024 - 5/31/2024.      INTERVAL SINCE COMPLETION OF RADIATION THERAPY: 2 weeks.      SUBJECTIVE:  Mr. Yi is a 74 year old male who is a former smoker 1 pack a day for 16 years and quit smoking since 1984.  Patient presented with recent history of some pain and discomfort related to this and thinks that has been present for about a month. He denies any otalgia or tongue numbness.  He was found to have a lesion on the left side of the tongue.  He does not have a dyne aphasia or dysphagia. He has not had any hoarseness and has not noticed any lumps or bumps in his neck.  ENT examination revealed an obvious mass pushing forward into the junctional tongue and tongue base. It has a large crater in the center with some raised tissue laterally and looks endophytic into the left tonsillar fossa.  There is no palpable lymphadenopathy.  Biopsy was obtained on 2/28/2024 with pathology showed p16 positive squamous cell carcinoma.  Patient had a staging PET CT scan on 3/6/2024 which showed FDG avid 2.7 x 2.6 x 4.4 cm mass in the left base of tongue extending inferiorly into the vallecula compatible with biopsy-proven squamous  cell carcinoma.  There is no evidence of lymph nodes and systemic metastasis.  The case has been reviewed on ENT tumor conference of [facility].  The tumor was staged T3 N0 M0 and the consensus recommendation is to consider concurrent chemoradiation therapy.  He is referred to radiation oncology for evaluation and consideration of definitive radiation therapy.  Patient had a 25 years history of not able to tolerate most of food which caused abdominal pain and diarrhea.  He is only able to get pancakes down without any significant difficulty.  Patient has been seen numerous GI specialist without any significant improvement. The patient received concurrent chemoradiation therapy for his head neck cancer and had radiation therapy in our clinic with a total dose of 7000 cGy in 35 treatments given from 4/3/2024 - 5/31/2024. The patient tolerated radiation therapy reasonably well with expected acute side effect.      The patient has been stable since completion of the radiation therapy.  He still have moderate pain and mild dysphagia at the time of evaluation.  Patient is currently taking pain medication with moderate relief.  He is able to get adequate calories and fluid at this time.  Patient otherwise denies any other ongoing issues and is here for routine post therapy office follow-up.     Medications were reviewed and are up to date on EPIC.    The following portions of the patient's history were reviewed and updated as appropriate: allergies, current medications, past family history, past medical history, past social history, past surgical history and problem list.    Review of Systems:      General  Constitutional  Constitutional (WDL): Exceptions to WDL  Fatigue: Fatigue relieved by rest  Weight Loss: 5 to less than 10% from baseline OR intervention not indicated  EENT  Eye Disorders  Eye Disorder (WDL): Exceptions to WDL  Blurred Vision: Intervention not indicated  Ear Disorders  Ear Disorder (WDL):  Exceptions to WDL  Tinnitus: Mild symptoms OR intervention not indicated  Respiratory  Respiratory  Respiratory (WDL): All respiratory elements are within defined limits  Cardiovascular  Cardiovascular  Cardiovascular (WDL): All cardiovascular elements are within defined limits  Gastrointestinal  Gastrointestinal  Gastrointestinal (WDL): Exceptions to WDL  Anorexia: Loss of appetite without alteration in eating habits  Dysgeusia: Altered taste with change in diet (e.g., oral supplements) OR noxious or unpleasant taste OR loss of taste  Dysphagia: Symptomatic and altered eating/swallowing  Mucositis Oral: Asymptomatic or mild symptoms OR intervention not indicated  Dry Mouth: Moderate symptoms OR oral intake alterations (e.g., copious water, other lubricants, diet limited to purees and/or soft, moist foods) OR unstimulated saliva 0.1 to 0.2 mL/min  Musculoskeletal  Musculoskeletal and Connective Tissue Disorders  Musculoskeletal & Connective (WDL): Exceptions to WDL  Arthralgia: Mild pain (back pain)  Integumentary  Integumentary  Integumentary (WDL): All integumentary elements are within defined limits  Neurological  Neurosensory  Neurosensory (WDL): Exceptions to WDL  Ataxia: Asymptomatic OR clinical or diagnostic observations only OR intervention not indicated (uses walking stick)  Genitourinary/Reproductive  Genitourinary  Genitourinary (WDL): Exceptions to WDL  Urinary Frequency: Present  Lymphatic  Lymph System Disorders  Lymph (WDL): All lymph elements are within defined limits  Pain  Pain Score: Severe Pain (6)  AUA Assessment                                                              Accompanied by  Accompanied By: spouse    Objective:      PHYSICAL EXAMINATION:    /77 (BP Location: Left arm, Patient Position: Sitting)   Pulse 90   Resp 20   Wt 70.6 kg (155 lb 9.6 oz)   SpO2 98%   BMI 22.33 kg/m      Gen: Alert, in NAD  Eyes: PERRL, EOMI, sclera anicteric  HENT     Head: NC/AT     Ears: No  external auricular lesions     Nose/sinus: No rhinorrhea or epistaxis     Oropharynx:  The oral mucosa showed post radiation therapy changes with no evidence of fungal infection.  The oral mucositis has been significant improved since radiation therapy.  Neck: Supple, full ROM, no LAD  Pulm: No wheezing, stridor or respiratory distress  CV: Well-perfused, no cyanosis, no pedal edema  Back: No step-offs or pain to palpation along the thoracolumbar spine  Rectal: Deferred  : Deferred  Musculoskeletal: Normal muscle bulk and tone  Skin: Normal color and turgor  Neurologic: A/Ox3, CN II-XII intact, normal gait and station  Psychiatric: Appropriate mood and affect      Impression     74 year old male with a diagnosis of squamous cell carcinoma involving left base of tongue, clinical stage T3 N0 M0, p16 positive, staging workup including PET/CT scan showed no evidence of lymph nodes and systemic metastasis.  The patient received concurrent chemoradiation therapy for his head neck cancer and had radiation therapy in our clinic with a total dose of 7000 cGy in 35 treatments given from 4/3/2024 - 5/31/2024.  The patient tolerated radiation therapy reasonably well with expected acute side effect.     Assessment & Plan:     1.  The patient has been stable since radiation therapy with moderate sore mouth and dysphagia.  He has been taking pain medication with moderate relief.  Discussed with the patient about appropriate nutritional support after therapy.  He is encouraged to increase his daily calorie and fluid intake if it is all possible.  Patient is also informed to contact radiation oncology and set up time for IV fluid if he does not have adequate intake.     2.  Follow-up with radiation oncology in 2 weeks.     3.  Continue follow-up with Dr. Aldana, medical oncology and Loren De Los Santos, ENT as planned.     Face to face time  15 minutes with > 80% spent on consultation, education and coordination of  "care.        Joyce Yang MD  Department of Radiation Oncology   [facility] Radiation Oncology  Tel: [phone]  Page: [phone]    [facility]  [address]  [place] [address]     [facility]   [address] [facility] [place] [address]    CC:  Patient Care Team:  Stephany Strickland MD as PCP - General (Pain Medicine)  Joyce Yang MD as MD (Radiation Oncology)  Michael Aldana MD (Internal Medicine)  Albert Pimentel MD as Assigned Surgical Provider  Joyce Yang MD as Assigned Cancer Care Provider  Cheryl Atkins DO as Assigned Palliative Care Provider  Srinivas Escobedo LP as Assigned Behavioral Health Provider  Molly Nesbitt APRN CNP as Assigned PCP      Oncology Rooming Note    June 13, 2024 4:04 PM   Donovan Yi is a 74 year old male who presents for:    Chief Complaint   Patient presents with     Oncology Clinic Visit     Follow up     Initial Vitals: /77 (BP Location: Left arm, Patient Position: Sitting)   Pulse 90   Resp 20   Wt 70.6 kg (155 lb 9.6 oz)   SpO2 98%   BMI 22.33 kg/m   Estimated body mass index is 22.33 kg/m  as calculated from the following:    Height as of 5/20/24: 1.778 m (5' 10\").    Weight as of this encounter: 70.6 kg (155 lb 9.6 oz). Body surface area is 1.87 meters squared.  Severe Pain (6) Comment: Data Unavailable   No LMP for male patient.  Allergies reviewed: Yes  Medications reviewed: Yes    Medications: Medication refills not needed today.  Pharmacy name entered into [employer]:    [facility] #[id number] - [place], [place] - [address] [id number] - [place], [place] - [address] AT [place] [address]    Frailty Screening:   Is the patient here for a new oncology consult visit in cancer care? 2. No      Clinical concerns: Follow up, weight loss of 1 lb, pain in tongue 6/10  Dr. Yang was notified.      Kayleen Weller RN                Again, thank you " for allowing me to participate in the care of your patient.        Sincerely,        Joyce Yang MD   32YM A&OX4 Ambulatory no significant PMHX or PSHX presents to the ED c/o diffuse abdominal pain for 2 days accompanied with chills/nausea since yesterday. pt reports having loose stool and had a family member who had similar sxs. pt denies CP, SOB, f/c, urinary symptoms, recent travels

## 2024-06-13 NOTE — PROGRESS NOTES
"Oncology Rooming Note    June 13, 2024 4:04 PM   Donovan Yi is a 74 year old male who presents for:    Chief Complaint   Patient presents with    Oncology Clinic Visit     Follow up     Initial Vitals: /77 (BP Location: Left arm, Patient Position: Sitting)   Pulse 90   Resp 20   Wt 70.6 kg (155 lb 9.6 oz)   SpO2 98%   BMI 22.33 kg/m   Estimated body mass index is 22.33 kg/m  as calculated from the following:    Height as of 5/20/24: 1.778 m (5' 10\").    Weight as of this encounter: 70.6 kg (155 lb 9.6 oz). Body surface area is 1.87 meters squared.  Severe Pain (6) Comment: Data Unavailable   No LMP for male patient.  Allergies reviewed: Yes  Medications reviewed: Yes    Medications: Medication refills not needed today.  Pharmacy name entered into [employer]:    [id number] [place], [place] - [address] [id number] [place], [place] - [address] AT [place] [address]    Frailty Screening:   Is the patient here for a new oncology consult visit in cancer care? 2. No      Clinical concerns: Follow up, weight loss of 1 lb, pain in tongue 6/10  Dr. Yang was notified.      Kayleen Weller RN              "

## 2024-06-17 DIAGNOSIS — K12.31 MUCOSITIS DUE TO CHEMOTHERAPY: ICD-10-CM

## 2024-06-17 RX ORDER — LIDOCAINE HYDROCHLORIDE 20 MG/ML
15 SOLUTION OROPHARYNGEAL
Qty: 200 ML | Refills: 1 | Status: SHIPPED | OUTPATIENT
Start: 2024-06-17 | End: 2024-07-03

## 2024-06-17 RX ORDER — MORPHINE SULFATE 10 MG/5ML
10-20 SOLUTION ORAL EVERY 4 HOURS PRN
Qty: 450 ML | Refills: 0 | Status: SHIPPED | OUTPATIENT
Start: 2024-06-17 | End: 2024-06-24

## 2024-06-17 NOTE — TELEPHONE ENCOUNTER
Discussed situation with spouse. Frustration over delay in getting compounded medications from pharmacy. Phani has gone 24 hours with no medications. Part of issue is having liquid morphine on hand, part is not having staff to compound Magic Mouthwash.   Delay in signing also contributes.  Encouraged spouse to have pharmacy aware that RX will be ongoing and to try to have morphine in stock. As for compound, it takes on average 48 hours and they do not do on weekend, so plan to ask by Wednesday.   Paulina Sierra RN

## 2024-06-24 ENCOUNTER — MYC REFILL (OUTPATIENT)
Dept: ONCOLOGY | Facility: HOSPITAL | Age: 74
End: 2024-06-24
Payer: MEDICARE

## 2024-06-24 DIAGNOSIS — K12.31 MUCOSITIS DUE TO CHEMOTHERAPY: ICD-10-CM

## 2024-06-24 DIAGNOSIS — C01 PRIMARY SQUAMOUS CELL CARCINOMA OF BASE OF TONGUE (H): ICD-10-CM

## 2024-06-24 NOTE — TELEPHONE ENCOUNTER
Last Written Prescription Date:  6/13/24  Last Fill Quantity: 480,  # refills: 2   Last office visit provider:  5/6/24 with Dr. Aldana    Requested Prescriptions   Pending Prescriptions Disp Refills    magic mouthwash suspension (diphenhydrAMINE, lidocaine, aluminum-magnesium & simethicone) [Pharmacy Med Name: *ANTACID/DIPHEN/LIDO 1:1:1 MOUTHWAS] 480 mL 2     Sig: SWISH NAD SPIT 15 ML IN MOUTH EVERY 2 HOURS AS NEEDED FOR MOUTH SORES       There is no refill protocol information for this order          Anju Jara RN 06/24/24 8:20 AM

## 2024-06-25 RX ORDER — MORPHINE SULFATE 10 MG/5ML
10-20 SOLUTION ORAL EVERY 4 HOURS PRN
Qty: 450 ML | Refills: 0 | Status: SHIPPED | OUTPATIENT
Start: 2024-06-25 | End: 2024-07-03

## 2024-06-26 ENCOUNTER — TELEPHONE (OUTPATIENT)
Dept: ONCOLOGY | Facility: HOSPITAL | Age: 74
End: 2024-06-26

## 2024-06-26 ENCOUNTER — OFFICE VISIT (OUTPATIENT)
Dept: RADIATION ONCOLOGY | Facility: CLINIC | Age: 74
End: 2024-06-26
Attending: RADIOLOGY
Payer: MEDICARE

## 2024-06-26 VITALS
SYSTOLIC BLOOD PRESSURE: 171 MMHG | HEART RATE: 80 BPM | BODY MASS INDEX: 22.93 KG/M2 | TEMPERATURE: 98 F | DIASTOLIC BLOOD PRESSURE: 78 MMHG | WEIGHT: 159.8 LBS | RESPIRATION RATE: 16 BRPM | OXYGEN SATURATION: 97 %

## 2024-06-26 DIAGNOSIS — C01 PRIMARY SQUAMOUS CELL CARCINOMA OF BASE OF TONGUE (H): Primary | ICD-10-CM

## 2024-06-26 PROCEDURE — G0463 HOSPITAL OUTPT CLINIC VISIT: HCPCS | Performed by: RADIOLOGY

## 2024-06-26 ASSESSMENT — PAIN SCALES - GENERAL: PAINLEVEL: MILD PAIN (2)

## 2024-06-26 NOTE — LETTER
6/26/2024      Donovan Yi  [address] [address]      Dear Colleague,    Thank you for referring your patient, Donovan Yi, to the [facility] RADIATION ONCOLOGY [place]. Please see a copy of my visit note below.    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       TYPE OF RADIATION THERAPY ADMINISTERED:  Concurrent chemoradiation therapy for his head neck cancer and had radiation therapy in our clinic with a total dose of 7000 cGy in 35 treatments given from 4/3/2024 - 5/31/2024.      INTERVAL SINCE COMPLETION OF RADIATION THERAPY: 4 weeks.      SUBJECTIVE:  Mr. Yi is a 74 year old male who is a former smoker 1 pack a day for 16 years and quit smoking since 1984.  Patient presented with recent history of some pain and discomfort related to this and thinks that has been present for about a month. He denies any otalgia or tongue numbness.  He was found to have a lesion on the left side of the tongue.  He does not have a dyne aphasia or dysphagia. He has not had any hoarseness and has not noticed any lumps or bumps in his neck.  ENT examination revealed an obvious mass pushing forward into the junctional tongue and tongue base. It has a large crater in the center with some raised tissue laterally and looks endophytic into the left tonsillar fossa.  There is no palpable lymphadenopathy.  Biopsy was obtained on 2/28/2024 with pathology showed p16 positive squamous cell carcinoma.  Patient had a staging PET CT scan on 3/6/2024 which showed FDG avid 2.7 x 2.6 x 4.4 cm mass in the left base of tongue extending inferiorly into the vallecula compatible with biopsy-proven squamous  cell carcinoma.  There is no evidence of lymph nodes and systemic metastasis.  The case has been reviewed on ENT tumor conference of [facility].  The tumor was staged T3 N0 M0 and the consensus recommendation is to consider concurrent chemoradiation therapy.  He is referred to radiation oncology for evaluation and consideration of definitive radiation therapy.  Patient had a 25 years history of not able to tolerate most of food which caused abdominal pain and diarrhea.  He is only able to get pancakes down without any significant difficulty.  Patient has been seen numerous GI specialist without any significant improvement. The patient received concurrent chemoradiation therapy for his head neck cancer and had radiation therapy in our clinic with a total dose of 7000 cGy in 35 treatments given from 4/3/2024 - 5/31/2024. The patient tolerated radiation therapy reasonably well with expected acute side effect.      The patient has been stable since completion of the radiation therapy.  He still have moderate pain and mild dysphagia at the time of evaluation.  Patient is currently taking pain medication with moderate relief.  He is able to get adequate calories and fluid at this time.  Patient otherwise denies any other ongoing issues and is here for routine post therapy office follow-up.     Medications were reviewed and are up to date on EPIC.    The following portions of the patient's history were reviewed and updated as appropriate: allergies, current medications, past family history, past medical history, past social history, past surgical history and problem list.    Review of Systems:      General  Constitutional  Constitutional (WDL): Exceptions to WDL  Fatigue: Fatigue relieved by rest  Weight Loss: 5 to less than 10% from baseline OR intervention not indicated  EENT  Eye Disorders  Eye Disorder (WDL): Exceptions to WDL  Blurred Vision: Intervention not indicated  Ear Disorders  Ear Disorder (WDL):  Exceptions to WDL  Tinnitus: Mild symptoms OR intervention not indicated  Respiratory  Respiratory  Respiratory (WDL): All respiratory elements are within defined limits  Cardiovascular  Cardiovascular  Cardiovascular (WDL): All cardiovascular elements are within defined limits (no pacemaker)  Gastrointestinal  Gastrointestinal  Gastrointestinal (WDL): Exceptions to WDL  Anorexia: Loss of appetite without alteration in eating habits  Dysgeusia: Altered taste with change in diet (e.g., oral supplements) OR noxious or unpleasant taste OR loss of taste  Dysphagia: Symptomatic and altered eating/swallowing  Mucositis Oral: Asymptomatic or mild symptoms OR intervention not indicated  Dry Mouth: Moderate symptoms OR oral intake alterations (e.g., copious water, other lubricants, diet limited to purees and/or soft, moist foods) OR unstimulated saliva 0.1 to 0.2 mL/min  Musculoskeletal  Musculoskeletal and Connective Tissue Disorders  Musculoskeletal & Connective (WDL): Exceptions to WDL  Arthralgia: Mild pain (back pain)  Integumentary  Integumentary  Integumentary (WDL): All integumentary elements are within defined limits  Neurological  Neurosensory  Neurosensory (WDL): Exceptions to WDL  Ataxia: Asymptomatic OR clinical or diagnostic observations only OR intervention not indicated (uses walking stick)  Genitourinary/Reproductive  Genitourinary  Genitourinary (WDL): Exceptions to WDL  Urinary Frequency: Present  Lymphatic  Lymph System Disorders  Lymph (WDL): All lymph elements are within defined limits  Pain  Pain Score: Mild Pain (2)  AUA Assessment                                                              Accompanied by  Accompanied By: spouse    Objective:      PHYSICAL EXAMINATION:    BP (!) 171/78 (BP Location: Right arm, Patient Position: Sitting, Cuff Size: Adult Regular)   Pulse 80   Temp 98  F (36.7  C) (Oral)   Resp 16   Wt 72.5 kg (159 lb 12.8 oz)   SpO2 97%   BMI 22.93 kg/m      Gen: Alert, in  NAD  Eyes: PERRL, EOMI, sclera anicteric  HENT     Head: NC/AT     Ears: No external auricular lesions     Nose/sinus: No rhinorrhea or epistaxis     Oropharynx:  The oral mucosa showed post radiation therapy changes with no evidence of fungal infection.  The oral mucositis has been significant improved since radiation therapy.   Neck: Supple, full ROM, no LAD  Pulm: No wheezing, stridor or respiratory distress  CV: Well-perfused, no cyanosis, no pedal edema  Back: No step-offs or pain to palpation along the thoracolumbar spine  Rectal: Deferred  : Deferred  Musculoskeletal: Normal muscle bulk and tone  Skin: Normal color and turgor  Neurologic: A/Ox3, CN II-XII intact, normal gait and station  Psychiatric: Appropriate mood and affect      Impression     74 year old male with a diagnosis of squamous cell carcinoma involving left base of tongue, clinical stage T3 N0 M0, p16 positive, staging workup including PET/CT scan showed no evidence of lymph nodes and systemic metastasis.  The patient received concurrent chemoradiation therapy for his head neck cancer and had radiation therapy in our clinic with a total dose of 7000 cGy in 35 treatments given from 4/3/2024 - 5/31/2024.  The patient tolerated radiation therapy reasonably well with expected acute side effect.     Assessment & Plan:     1.  The patient has been stable since radiation therapy with moderate sore mouth and dysphagia.  He has been taking pain medication with moderate relief.  Discussed with the patient about appropriate nutritional support after therapy.  He is encouraged to increase his daily calorie and fluid intake if it is all possible.  Patient is also informed to contact radiation oncology and set up time for IV fluid if he does not have adequate intake.     2.  Follow-up with radiation oncology in 2 months, possibly after PET scan.     3.  Continue follow-up with Dr. Aldana, medical oncology and Loren De Los Santos, ENT as planned.     Face  "to face time  15 minutes with > 80% spent on consultation, education and coordination of care.    Pain Management Plan: Continue his pain medication with pain clinic.    Face to face time  15 minutes with > 80% spent on consultation, education and coordination of care.      Joyce Yang MD  Department of Radiation Oncology   [facility] Radiation Oncology  Tel: [phone]  Page: [phone]    [facility]  [address] [place] [address]     [facility]   [address] [facility] [place] [address]    CC:  Patient Care Team:  Stephany Strickland MD as PCP - General (Pain Medicine)  Joyce Yang MD as MD (Radiation Oncology)  Michael Aldana MD (Internal Medicine)  Albert Pimentel MD as Assigned Surgical Provider  Joyce Yang MD as Assigned Cancer Care Provider  Cheryl Atkins DO as Assigned Palliative Care Provider  Srinivas Escobedo LP as Assigned Behavioral Health Provider  Molly Nesbitt APRN CNP as Assigned PCP      Oncology Rooming Note    June 26, 2024 1:24 PM   Donovan Yi is a 74 year old male who presents for:    Chief Complaint   Patient presents with     Oncology Clinic Visit     Follow up with Dr. Yang     Initial Vitals: BP (!) 171/78 (BP Location: Right arm, Patient Position: Sitting, Cuff Size: Adult Regular)   Pulse 80   Temp 98  F (36.7  C) (Oral)   Resp 16   Wt 72.5 kg (159 lb 12.8 oz)   SpO2 97%   BMI 22.93 kg/m   Estimated body mass index is 22.93 kg/m  as calculated from the following:    Height as of 5/20/24: 1.778 m (5' 10\").    Weight as of this encounter: 72.5 kg (159 lb 12.8 oz). Body surface area is 1.89 meters squared.  Mild Pain (2) Comment: Data Unavailable   No LMP for male patient.  Allergies reviewed: Yes  Medications reviewed: Yes    Medications: Medication refills not needed today.  Pharmacy name entered into [employer]:    [id number] - [place], [place] - [address] AT [facility] " [facility]ORE #[id number] [place] [id number] AT [place] [address]    Frailty Screening:   Is the patient here for a new oncology consult visit in cancer care? 2. No      Clinical concerns: Patient here ambulatory accompanied by spouse for follow-up status post radiation for his base of tongue cancer.  Patient states he continues to improve.  States his biggest problem has been the sore on the side of his tongue that he developed after her tooth was pulled prior to treatment.  Rates his pain on the tongue at 3/10.  Continues on morphine for this.  Patient has a follow-up with his pain clinic 7/9/2024.  Patient states he has been getting phone calls about scheduling with the ENT.  Encouraged patient to make those appointments for follow-up with ENT.  Seen by Dr. Yang.  Plan return to clinic for follow-up as directed by provider.   Dr. Yang was notified.      Kendra Hodges RN                Again, thank you for allowing me to participate in the care of your patient.        Sincerely,        Joyce Yang MD

## 2024-06-26 NOTE — PROGRESS NOTES
"Oncology Rooming Note    June 26, 2024 1:24 PM   Donovan Yi is a 74 year old male who presents for:    Chief Complaint   Patient presents with    Oncology Clinic Visit     Follow up with Dr. Yang     Initial Vitals: BP (!) 171/78 (BP Location: Right arm, Patient Position: Sitting, Cuff Size: Adult Regular)   Pulse 80   Temp 98  F (36.7  C) (Oral)   Resp 16   Wt 72.5 kg (159 lb 12.8 oz)   SpO2 97%   BMI 22.93 kg/m   Estimated body mass index is 22.93 kg/m  as calculated from the following:    Height as of 5/20/24: 1.778 m (5' 10\").    Weight as of this encounter: 72.5 kg (159 lb 12.8 oz). Body surface area is 1.89 meters squared.  Mild Pain (2) Comment: Data Unavailable   No LMP for male patient.  Allergies reviewed: Yes  Medications reviewed: Yes    Medications: Medication refills not needed today.  Pharmacy name entered into [employer]:    [id number] [place], [place] - [address] [id number] [place], [place] - [address] AT [place] [address]    Frailty Screening:   Is the patient here for a new oncology consult visit in cancer care? 2. No      Clinical concerns: Patient here ambulatory accompanied by spouse for follow-up status post radiation for his base of tongue cancer.  Patient states he continues to improve.  States his biggest problem has been the sore on the side of his tongue that he developed after her tooth was pulled prior to treatment.  Rates his pain on the tongue at 3/10.  Continues on morphine for this.  Patient has a follow-up with his pain clinic 7/9/2024.  Patient states he has been getting phone calls about scheduling with the ENT.  Encouraged patient to make those appointments for follow-up with ENT.  Seen by Dr. Yang.  Plan return to clinic for follow-up as directed by provider.   Dr. Yang was notified.      Kendra Hodges RN              "

## 2024-06-26 NOTE — PROGRESS NOTES
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       TYPE OF RADIATION THERAPY ADMINISTERED:  Concurrent chemoradiation therapy for his head neck cancer and had radiation therapy in our clinic with a total dose of 7000 cGy in 35 treatments given from 4/3/2024 - 5/31/2024.      INTERVAL SINCE COMPLETION OF RADIATION THERAPY: 4 weeks.      SUBJECTIVE:  Mr. Yi is a 74 year old male who is a former smoker 1 pack a day for 16 years and quit smoking since 1984.  Patient presented with recent history of some pain and discomfort related to this and thinks that has been present for about a month. He denies any otalgia or tongue numbness.  He was found to have a lesion on the left side of the tongue.  He does not have a dyne aphasia or dysphagia. He has not had any hoarseness and has not noticed any lumps or bumps in his neck.  ENT examination revealed an obvious mass pushing forward into the junctional tongue and tongue base. It has a large crater in the center with some raised tissue laterally and looks endophytic into the left tonsillar fossa.  There is no palpable lymphadenopathy.  Biopsy was obtained on 2/28/2024 with pathology showed p16 positive squamous cell carcinoma.  Patient had a staging PET CT scan on 3/6/2024 which showed FDG avid 2.7 x 2.6 x 4.4 cm mass in the left base of tongue extending inferiorly into the vallecula compatible with biopsy-proven squamous cell carcinoma.  There is no evidence of lymph nodes and systemic metastasis.  The case has been reviewed on ENT tumor conference of [facility].  The tumor was staged T3 N0 M0 and the consensus recommendation is to consider concurrent chemoradiation  therapy.  He is referred to radiation oncology for evaluation and consideration of definitive radiation therapy.  Patient had a 25 years history of not able to tolerate most of food which caused abdominal pain and diarrhea.  He is only able to get pancakes down without any significant difficulty.  Patient has been seen numerous GI specialist without any significant improvement. The patient received concurrent chemoradiation therapy for his head neck cancer and had radiation therapy in our clinic with a total dose of 7000 cGy in 35 treatments given from 4/3/2024 - 5/31/2024. The patient tolerated radiation therapy reasonably well with expected acute side effect.      The patient has been stable since completion of the radiation therapy.  He still have moderate pain and mild dysphagia at the time of evaluation.  Patient is currently taking pain medication with moderate relief.  He is able to get adequate calories and fluid at this time.  Patient otherwise denies any other ongoing issues and is here for routine post therapy office follow-up.     Medications were reviewed and are up to date on EPIC.    The following portions of the patient's history were reviewed and updated as appropriate: allergies, current medications, past family history, past medical history, past social history, past surgical history and problem list.    Review of Systems:      General  Constitutional  Constitutional (WDL): Exceptions to WDL  Fatigue: Fatigue relieved by rest  Weight Loss: 5 to less than 10% from baseline OR intervention not indicated  EENT  Eye Disorders  Eye Disorder (WDL): Exceptions to WDL  Blurred Vision: Intervention not indicated  Ear Disorders  Ear Disorder (WDL): Exceptions to WDL  Tinnitus: Mild symptoms OR intervention not indicated  Respiratory  Respiratory  Respiratory (WDL): All respiratory elements are within defined limits  Cardiovascular  Cardiovascular  Cardiovascular (WDL): All cardiovascular elements are within  defined limits (no pacemaker)  Gastrointestinal  Gastrointestinal  Gastrointestinal (WDL): Exceptions to WDL  Anorexia: Loss of appetite without alteration in eating habits  Dysgeusia: Altered taste with change in diet (e.g., oral supplements) OR noxious or unpleasant taste OR loss of taste  Dysphagia: Symptomatic and altered eating/swallowing  Mucositis Oral: Asymptomatic or mild symptoms OR intervention not indicated  Dry Mouth: Moderate symptoms OR oral intake alterations (e.g., copious water, other lubricants, diet limited to purees and/or soft, moist foods) OR unstimulated saliva 0.1 to 0.2 mL/min  Musculoskeletal  Musculoskeletal and Connective Tissue Disorders  Musculoskeletal & Connective (WDL): Exceptions to WDL  Arthralgia: Mild pain (back pain)  Integumentary  Integumentary  Integumentary (WDL): All integumentary elements are within defined limits  Neurological  Neurosensory  Neurosensory (WDL): Exceptions to WDL  Ataxia: Asymptomatic OR clinical or diagnostic observations only OR intervention not indicated (uses walking stick)  Genitourinary/Reproductive  Genitourinary  Genitourinary (WDL): Exceptions to WDL  Urinary Frequency: Present  Lymphatic  Lymph System Disorders  Lymph (WDL): All lymph elements are within defined limits  Pain  Pain Score: Mild Pain (2)  AUA Assessment                                                              Accompanied by  Accompanied By: spouse    Objective:      PHYSICAL EXAMINATION:    BP (!) 171/78 (BP Location: Right arm, Patient Position: Sitting, Cuff Size: Adult Regular)   Pulse 80   Temp 98  F (36.7  C) (Oral)   Resp 16   Wt 72.5 kg (159 lb 12.8 oz)   SpO2 97%   BMI 22.93 kg/m      Gen: Alert, in NAD  Eyes: PERRL, EOMI, sclera anicteric  HENT     Head: NC/AT     Ears: No external auricular lesions     Nose/sinus: No rhinorrhea or epistaxis     Oropharynx:  The oral mucosa showed post radiation therapy changes with no evidence of fungal infection.  The oral  mucositis has been significant improved since radiation therapy.   Neck: Supple, full ROM, no LAD  Pulm: No wheezing, stridor or respiratory distress  CV: Well-perfused, no cyanosis, no pedal edema  Back: No step-offs or pain to palpation along the thoracolumbar spine  Rectal: Deferred  : Deferred  Musculoskeletal: Normal muscle bulk and tone  Skin: Normal color and turgor  Neurologic: A/Ox3, CN II-XII intact, normal gait and station  Psychiatric: Appropriate mood and affect      Impression     74 year old male with a diagnosis of squamous cell carcinoma involving left base of tongue, clinical stage T3 N0 M0, p16 positive, staging workup including PET/CT scan showed no evidence of lymph nodes and systemic metastasis.  The patient received concurrent chemoradiation therapy for his head neck cancer and had radiation therapy in our clinic with a total dose of 7000 cGy in 35 treatments given from 4/3/2024 - 5/31/2024.  The patient tolerated radiation therapy reasonably well with expected acute side effect.     Assessment & Plan:     1.  The patient has been stable since radiation therapy with moderate sore mouth and dysphagia.  He has been taking pain medication with moderate relief.  Discussed with the patient about appropriate nutritional support after therapy.  He is encouraged to increase his daily calorie and fluid intake if it is all possible.  Patient is also informed to contact radiation oncology and set up time for IV fluid if he does not have adequate intake.     2.  Follow-up with radiation oncology in 2 months, possibly after PET scan.     3.  Continue follow-up with Dr. Aldana, medical oncology and Loren De Los Santos, ENT as planned.     Face to face time  15 minutes with > 80% spent on consultation, education and coordination of care.    Pain Management Plan: Continue his pain medication with pain clinic.    Face to face time  15 minutes with > 80% spent on consultation, education and coordination of  care.      Joyce Yang MD  Department of Radiation Oncology   [facility] Radiation Oncology  Tel: [phone]  Page: [phone]    [facility]  [address] [place] [address]     [place] [address] [facility] [address] [place] [address]    CC:  Patient Care Team:  Stephany Strickland MD as PCP - General (Pain Medicine)  Joyce Yang MD as MD (Radiation Oncology)  Michael Aldana MD (Internal Medicine)  Albert Pimentel MD as Assigned Surgical Provider  Joyce Yang MD as Assigned Cancer Care Provider  Cheryl Atkins DO as Assigned Palliative Care Provider  Srinivas Escobedo LP as Assigned Behavioral Health Provider  Molly Nesbitt APRN CNP as Assigned PCP

## 2024-06-26 NOTE — TELEPHONE ENCOUNTER
Retail Pharmacy Prior Authorization Team   Phone: [phone]    PA Initiation    Medication: MORPHINE SULFATE 10 MG/5ML PO SOLN  Insurance Company: [employer] - Phone [phone] Fax [phone]  Pharmacy Filling the Rx: [facility] #[id number] [place], [place] - [facility] [address] AT [place] [address]  Filling Pharmacy Phone: [phone]  Filling Pharmacy Fax: [phone]  Start Date: 6/26/2024

## 2024-06-26 NOTE — TELEPHONE ENCOUNTER
Prior Authorization Approval    Medication: MORPHINE SULFATE 10 MG/5ML PO SOLN  Authorization Effective Date: 1/1/2024  Authorization Expiration Date: 12/31/2024  Approved Dose/Quantity:   Reference #:     Insurance Company: [employer] - Phone [phone] Fax [phone]  Expected CoPay: $    CoPay Card Available:      Financial Assistance Needed:   Which Pharmacy is filling the prescription: [facility] [address] [place], [place] [address] AT [place] [address]  Pharmacy Notified: Yes  Patient Notified:

## 2024-07-03 ENCOUNTER — MYC MEDICAL ADVICE (OUTPATIENT)
Dept: FAMILY MEDICINE | Facility: CLINIC | Age: 74
End: 2024-07-03
Payer: MEDICARE

## 2024-07-03 ENCOUNTER — MYC REFILL (OUTPATIENT)
Dept: ONCOLOGY | Facility: HOSPITAL | Age: 74
End: 2024-07-03
Payer: MEDICARE

## 2024-07-03 DIAGNOSIS — C01 PRIMARY SQUAMOUS CELL CARCINOMA OF BASE OF TONGUE (H): ICD-10-CM

## 2024-07-03 DIAGNOSIS — K12.31 MUCOSITIS DUE TO CHEMOTHERAPY: ICD-10-CM

## 2024-07-03 DIAGNOSIS — R35.1 NOCTURIA: ICD-10-CM

## 2024-07-03 RX ORDER — LIDOCAINE HYDROCHLORIDE 20 MG/ML
15 SOLUTION OROPHARYNGEAL
Qty: 200 ML | Refills: 1 | Status: SHIPPED | OUTPATIENT
Start: 2024-07-03 | End: 2024-07-26

## 2024-07-03 RX ORDER — MORPHINE SULFATE 10 MG/5ML
10-20 SOLUTION ORAL EVERY 4 HOURS PRN
Qty: 450 ML | Refills: 0 | Status: SHIPPED | OUTPATIENT
Start: 2024-07-03 | End: 2024-07-26

## 2024-07-03 NOTE — TELEPHONE ENCOUNTER
Morphine was filled for a 7.5 days supply on 6/26. Pt has completed all treatment but still has one ulcer remaining on the side of his tongue. He is using MMW in addition but no other forms of pain meds. I did suggest ibuprofen but wife said nothing works, except the morphine. Mannie

## 2024-07-08 ENCOUNTER — MYC MEDICAL ADVICE (OUTPATIENT)
Dept: FAMILY MEDICINE | Facility: CLINIC | Age: 74
End: 2024-07-08
Payer: MEDICARE

## 2024-07-08 DIAGNOSIS — R35.1 NOCTURIA: ICD-10-CM

## 2024-07-08 RX ORDER — LISINOPRIL 10 MG/1
20 TABLET ORAL DAILY
Qty: 90 TABLET | Refills: 1 | OUTPATIENT
Start: 2024-07-08

## 2024-07-09 ENCOUNTER — TRANSFERRED RECORDS (OUTPATIENT)
Dept: HEALTH INFORMATION MANAGEMENT | Facility: CLINIC | Age: 74
End: 2024-07-09
Payer: MEDICARE

## 2024-07-09 ENCOUNTER — MYC REFILL (OUTPATIENT)
Dept: ONCOLOGY | Facility: HOSPITAL | Age: 74
End: 2024-07-09
Payer: MEDICARE

## 2024-07-09 DIAGNOSIS — K12.31 MUCOSITIS DUE TO CHEMOTHERAPY: ICD-10-CM

## 2024-07-09 RX ORDER — TAMSULOSIN HYDROCHLORIDE 0.4 MG/1
0.4 CAPSULE ORAL DAILY
Qty: 90 CAPSULE | Refills: 1 | Status: SHIPPED | OUTPATIENT
Start: 2024-07-09 | End: 2024-07-26

## 2024-07-09 RX ORDER — TAMSULOSIN HYDROCHLORIDE 0.4 MG/1
0.4 CAPSULE ORAL DAILY
Qty: 90 CAPSULE | Refills: 1 | Status: SHIPPED | OUTPATIENT
Start: 2024-07-09 | End: 2024-09-23

## 2024-07-17 ENCOUNTER — TELEPHONE (OUTPATIENT)
Dept: OTOLARYNGOLOGY | Facility: CLINIC | Age: 74
End: 2024-07-17
Payer: MEDICARE

## 2024-07-17 NOTE — TELEPHONE ENCOUNTER
Left Voicemail (1st Attempt) for the patient to call back and schedule the following:    Appointment type: Return Ent   Provider: Dr. Pimentel  Return date: next available Can overbook next few clinics  Specialty phone number: writer's direct  Additional appointment(s) needed:   Additional Notes: Overbook in next few clinics

## 2024-07-19 ENCOUNTER — TELEPHONE (OUTPATIENT)
Dept: OTOLARYNGOLOGY | Facility: CLINIC | Age: 74
End: 2024-07-19

## 2024-07-19 NOTE — TELEPHONE ENCOUNTER
Left Voicemail (2nd Attempt) for the patient to call back and schedule the following:    Appointment type: return ENT   Provider: Dr. Pimentel  Return date: Next few weeks  Specialty phone number: writer's direct  Additional appointment(s) needed:   Additional Notes: schedule him for follow-up ASAP with Dr. Pimentel can add to next few clinics and overbook. He needs follow-up after treatment      Deconditioning

## 2024-07-23 ENCOUNTER — TELEPHONE (OUTPATIENT)
Dept: OTOLARYNGOLOGY | Facility: CLINIC | Age: 74
End: 2024-07-23
Payer: MEDICARE

## 2024-07-23 NOTE — TELEPHONE ENCOUNTER
Left Voicemail (1st Attempt) for the patient to call back and schedule the following:    Appointment type: Return ENT   Provider: Dr. Pimentel  Return date: Can overbook within next few clinics      Specialty phone number: writer's direct  Additional appointment(s) needed:   Additional Notes:

## 2024-07-25 ENCOUNTER — TELEPHONE (OUTPATIENT)
Dept: OTOLARYNGOLOGY | Facility: CLINIC | Age: 74
End: 2024-07-25
Payer: MEDICARE

## 2024-07-25 NOTE — TELEPHONE ENCOUNTER
Left Voicemail (2nd Attempt) for the patient to call back and schedule the following:    Appointment type: Return ENT   Provider: Dr. Pimentel  Return date: Overbook within next few clinics  Specialty phone number: writer's direct  Additional appointment(s) needed:   Additional Notes:

## 2024-07-25 NOTE — TELEPHONE ENCOUNTER
Patient confirmed scheduled appointment:  Date: 8/14  Time: 2:15pm  Visit type: Return ENT   Provider: Dr. Pimentel  Location: [facility]  Testing/imaging:   Additional notes:

## 2024-07-26 ENCOUNTER — OFFICE VISIT (OUTPATIENT)
Dept: FAMILY MEDICINE | Facility: CLINIC | Age: 74
End: 2024-07-26
Payer: MEDICARE

## 2024-07-26 ENCOUNTER — MYC MEDICAL ADVICE (OUTPATIENT)
Dept: FAMILY MEDICINE | Facility: CLINIC | Age: 74
End: 2024-07-26

## 2024-07-26 VITALS
WEIGHT: 169 LBS | HEART RATE: 75 BPM | SYSTOLIC BLOOD PRESSURE: 171 MMHG | DIASTOLIC BLOOD PRESSURE: 82 MMHG | HEIGHT: 70 IN | OXYGEN SATURATION: 100 % | BODY MASS INDEX: 24.2 KG/M2 | TEMPERATURE: 98.9 F | RESPIRATION RATE: 14 BRPM

## 2024-07-26 DIAGNOSIS — R55 PRE-SYNCOPE: ICD-10-CM

## 2024-07-26 DIAGNOSIS — I10 ESSENTIAL HYPERTENSION: Primary | ICD-10-CM

## 2024-07-26 DIAGNOSIS — R63.1 EXCESSIVE THIRST: ICD-10-CM

## 2024-07-26 DIAGNOSIS — R29.6 FALLS FREQUENTLY: ICD-10-CM

## 2024-07-26 DIAGNOSIS — R73.9 ELEVATED BLOOD SUGAR: ICD-10-CM

## 2024-07-26 DIAGNOSIS — R09.89 LABILE BLOOD PRESSURE: ICD-10-CM

## 2024-07-26 DIAGNOSIS — F11.20 OPIOID TYPE DEPENDENCE, CONTINUOUS (H): ICD-10-CM

## 2024-07-26 DIAGNOSIS — E03.9 HYPOTHYROIDISM, UNSPECIFIED TYPE: Primary | ICD-10-CM

## 2024-07-26 DIAGNOSIS — R42 WEAKNESS WITH DIZZINESS: ICD-10-CM

## 2024-07-26 DIAGNOSIS — R53.1 WEAKNESS WITH DIZZINESS: ICD-10-CM

## 2024-07-26 LAB
ERYTHROCYTE [DISTWIDTH] IN BLOOD BY AUTOMATED COUNT: 12.6 % (ref 10–15)
HBA1C MFR BLD: 4.8 % (ref 0–5.6)
HCT VFR BLD AUTO: 34.8 % (ref 40–53)
HGB BLD-MCNC: 11.6 G/DL (ref 13.3–17.7)
MCH RBC QN AUTO: 32.8 PG (ref 26.5–33)
MCHC RBC AUTO-ENTMCNC: 33.3 G/DL (ref 31.5–36.5)
MCV RBC AUTO: 98 FL (ref 78–100)
PLATELET # BLD AUTO: 143 10E3/UL (ref 150–450)
RBC # BLD AUTO: 3.54 10E6/UL (ref 4.4–5.9)
WBC # BLD AUTO: 4.7 10E3/UL (ref 4–11)

## 2024-07-26 PROCEDURE — 80053 COMPREHEN METABOLIC PANEL: CPT

## 2024-07-26 PROCEDURE — 83735 ASSAY OF MAGNESIUM: CPT

## 2024-07-26 PROCEDURE — 83036 HEMOGLOBIN GLYCOSYLATED A1C: CPT

## 2024-07-26 PROCEDURE — 84439 ASSAY OF FREE THYROXINE: CPT

## 2024-07-26 PROCEDURE — 85027 COMPLETE CBC AUTOMATED: CPT

## 2024-07-26 PROCEDURE — 84443 ASSAY THYROID STIM HORMONE: CPT

## 2024-07-26 PROCEDURE — 99214 OFFICE O/P EST MOD 30 MIN: CPT

## 2024-07-26 PROCEDURE — G2211 COMPLEX E/M VISIT ADD ON: HCPCS

## 2024-07-26 PROCEDURE — 36415 COLL VENOUS BLD VENIPUNCTURE: CPT

## 2024-07-26 RX ORDER — LISINOPRIL 10 MG/1
10 TABLET ORAL DAILY
Qty: 90 TABLET | Refills: 0 | Status: SHIPPED | OUTPATIENT
Start: 2024-07-26

## 2024-07-26 NOTE — ASSESSMENT & PLAN NOTE
Patient presents to discuss hypertension.  Since his last visit two months ago, he notes continued struggles with dizziness and some significant decreases in diastolic blood pressure that he reports coincide. His blood pressure seems quite labile. He's been on amlodipine and losartan with these same results.  He reports concurrent symptoms of presyncope with a recent fall although the fall was more mechanical in nature.  We discussed as a pertains to blood pressure, I think we switch back to lisinopril but at a lower dose.  He is hypertensive in clinic, but we discussed I would much rather have him slightly hypertensive then hypotensive with syncope.  I think he would benefit from a cardiology consult given his labile blood pressures and side effects thus far and this was placed today.  With presyncope sensations, I suggest that we update some labs today to assess for any contributing factors such as anemia, electrolyte dysfunction or thyroid abnormalities. These were ordered. Discussed that I would recommend an EKG and portable cardiac monitor to assess for any arrhythmias as this would be my priorities to rule out, but patient declines these today.  We discussed the potential consequences of this.  We also discussed potential medication causes of syncope and hypotension and that he is on multiple medications in which this is a known side effect.  He is on chronic, high-dose opioids and Flomax. He was previously on gabapentin.  None of his symptoms seem to correlate with the initiation or discontinuation of any of these medications however.  We discussed I would have a low threshold for him to be evaluated with any worsening of symptoms or new concerning symptoms and patient expressed understanding of and agreement this plan.  All questions were answered.

## 2024-07-26 NOTE — ASSESSMENT & PLAN NOTE
Managed by [facility]. Patient will be seen by a new provider at the clinic the first week of next month.

## 2024-07-26 NOTE — ASSESSMENT & PLAN NOTE
Patient has recently finished with radiation therapy for squamous cell carcinoma of the tongue.  As result of his treatment, he has experienced fairly significant dry mouth.  As a result, he is consuming a large amount of liquids to which is then causing him to urinate frequently overnight and interfere with his sleep hygiene.  We discussed that  I can see some random elevations in his blood glucose to with previous monitoring labs and I would recommend we check a hemoglobin A1c.  This was normal at 4.8%.  Discussed that I am hesitant to address his insomnia with any additional medications given his concurrent opioid use and issues with dizziness and recent falls.  I think that we need to address the dry mouth with ENT given failed response to previously trialed therapeutic and he does have an appointment scheduled in the coming weeks.  Patient is amenable to this plan.

## 2024-07-26 NOTE — PROGRESS NOTES
Assessment & Plan   Problem List Items Addressed This Visit       Opioid type dependence, continuous (H)     Managed by [facility]. Patient will be seen by a new provider at the clinic the first week of next month.         Essential hypertension - Primary     Patient presents to discuss hypertension.  Since his last visit two months ago, he notes continued struggles with dizziness and some significant decreases in diastolic blood pressure that he reports coincide. His blood pressure seems quite labile. He's been on amlodipine and losartan with these same results.  He reports concurrent symptoms of presyncope with a recent fall although the fall was more mechanical in nature.  We discussed as a pertains to blood pressure, I think we switch back to lisinopril but at a lower dose.  He is hypertensive in clinic, but we discussed I would much rather have him slightly hypertensive then hypotensive with syncope.  I think he would benefit from a cardiology consult given his labile blood pressures and side effects thus far and this was placed today.  With presyncope sensations, I suggest that we update some labs today to assess for any contributing factors such as anemia, electrolyte dysfunction or thyroid abnormalities. These were ordered. Discussed that I would recommend an EKG and portable cardiac monitor to assess for any arrhythmias as this would be my priorities to rule out, but patient declines these today.  We discussed the potential consequences of this.  We also discussed potential medication causes of syncope and hypotension and that he is on multiple medications in which this is a known side effect.  He is on chronic, high-dose opioids and Flomax. He was previously on gabapentin.  None of his symptoms seem to correlate with the initiation or discontinuation of any of these medications however.  We discussed I would have a low threshold for him to be evaluated with any worsening of symptoms or new  concerning symptoms and patient expressed understanding of and agreement this plan.  All questions were answered.         Relevant Medications    lisinopril (ZESTRIL) 10 MG tablet    Other Relevant Orders    Adult Cardiology Eval  Referral    Falls frequently    Pre-syncope    Relevant Orders    Adult Cardiology Eval  Referral    Comprehensive metabolic panel (BMP + Alb, Alk Phos, ALT, AST, Total. 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  As result of his treatment, he has experienced fairly significant dry mouth.  As a result, he is consuming a large amount of liquids to which is then causing him to urinate frequently overnight and interfere with his sleep hygiene.  We discussed that  I can see some random elevations in his blood glucose to with previous monitoring labs and I would recommend we check a hemoglobin A1c.  This was normal at 4.8%.  Discussed that I am hesitant to address his insomnia with any additional medications given his concurrent opioid use and issues with dizziness and recent falls.  I think that we need to address the dry mouth with ENT given failed response to previously trialed therapeutic and he does have an appointment scheduled in the coming weeks.  Patient is amenable to this plan.         Relevant Orders    Hemoglobin A1c (Completed)     Other Visit Diagnoses       Labile blood pressure        Relevant Orders    Adult Cardiology Eval  Referral    Weakness with dizziness        Relevant Orders    TSH with free T4 reflex    Elevated blood sugar        Relevant Orders    Hemoglobin A1c (Completed)           The longitudinal plan of care for the diagnosis(es)/condition(s) as documented were addressed during this visit. Due to the added complexity in care, I will continue to support Phani in the subsequent  "management and with ongoing continuity of care.    Subjective   Phani is a 74 year old, presenting for the following health issues:  Hypertension        7/26/2024     2:55 PM   Additional Questions   Roomed by as   Accompanied by self         7/26/2024     2:55 PM   Patient Reported Additional Medications   Patient reports taking the following new medications no     Blood pressure check  Started on losartan 25mg two months ago  When he started this medication, he was noticing more of a gap between his diastolic and systolic pressures. Diastolic was as low as 50s.   Home blood pressures have been 150/70 at home. He is quite symptomatic with any drops in his blood pressure.     Additionally, he is hoping to discuss issues with insomnia:  -Sleeping for no more than an hour at a time  -Drinks water very frequently due to dry mouth (history of recent radiation for squamous cell carcinoma of tongue).    Also has some concerns regarding the appearance of his neck. Feels like he has a flap to the anterior neck. He was recently measured for a suit and neck seemed thicker. Wonders if this is permanent.     History of Present Illness       Hypertension: He presents for follow up of hypertension.  He does check blood pressure  regularly outside of the clinic. Outside blood pressures have been over 140/90. He does not follow a low salt diet.     He eats 0-1 servings of fruits and vegetables daily.He consumes 0 sweetened beverage(s) daily.He exercises with enough effort to increase his heart rate 30 to 60 minutes per day.  He exercises with enough effort to increase his heart rate 5 days per week.   He is taking medications regularly.         Objective    BP (!) 171/82 (BP Location: Left arm, Patient Position: Left side, Cuff Size: Adult Large)   Pulse 75   Temp 98.9  F (37.2  C) (Oral)   Resp 14   Ht 1.778 m (5' 10\")   Wt 76.7 kg (169 lb)   SpO2 100%   BMI 24.25 kg/m    Body mass index is 24.25 kg/m .    Physical " Exam  Vitals and nursing note reviewed.   Constitutional:       General: He is not in acute distress.     Appearance: Normal appearance. He is not ill-appearing.   Cardiovascular:      Rate and Rhythm: Normal rate and regular rhythm.      Heart sounds: No murmur heard.  Pulmonary:      Effort: Pulmonary effort is normal. No respiratory distress.   Musculoskeletal:      Cervical back: Normal range of motion and neck supple. No rigidity.   Lymphadenopathy:      Cervical: No cervical adenopathy.   Neurological:      Mental Status: He is alert.   Psychiatric:         Mood and Affect: Mood normal.         Behavior: Behavior normal.         Thought Content: Thought content normal.      Results for orders placed or performed in visit on 07/26/24 (from the past 24 hour(s))   CBC with platelets   Result Value Ref Range    WBC Count 4.7 4.0 - 11.0 10e3/uL    RBC Count 3.54 (L) 4.40 - 5.90 10e6/uL    Hemoglobin 11.6 (L) 13.3 - 17.7 g/dL    Hematocrit 34.8 (L) 40.0 - 53.0 %    MCV 98 78 - 100 fL    MCH 32.8 26.5 - 33.0 pg    MCHC 33.3 31.5 - 36.5 g/dL    RDW 12.6 10.0 - 15.0 %    Platelet Count 143 (L) 150 - 450 10e3/uL   Hemoglobin A1c   Result Value Ref Range    Hemoglobin A1C 4.8 0.0 - 5.6 %           Signed Electronically by: HIREN Moody CNP

## 2024-07-27 LAB
ALBUMIN SERPL BCG-MCNC: 4.4 G/DL (ref 3.5–5.2)
ALP SERPL-CCNC: 61 U/L (ref 40–150)
ALT SERPL W P-5'-P-CCNC: 20 U/L (ref 0–70)
ANION GAP SERPL CALCULATED.3IONS-SCNC: 10 MMOL/L (ref 7–15)
AST SERPL W P-5'-P-CCNC: 21 U/L (ref 0–45)
BILIRUB SERPL-MCNC: 0.7 MG/DL
BUN SERPL-MCNC: 18.8 MG/DL (ref 8–23)
CALCIUM SERPL-MCNC: 9.1 MG/DL (ref 8.8–10.4)
CHLORIDE SERPL-SCNC: 99 MMOL/L (ref 98–107)
CREAT SERPL-MCNC: 0.86 MG/DL (ref 0.67–1.17)
EGFRCR SERPLBLD CKD-EPI 2021: 90 ML/MIN/1.73M2
GLUCOSE SERPL-MCNC: 100 MG/DL (ref 70–99)
HCO3 SERPL-SCNC: 31 MMOL/L (ref 22–29)
POTASSIUM SERPL-SCNC: 4.2 MMOL/L (ref 3.4–5.3)
PROT SERPL-MCNC: 7.2 G/DL (ref 6.4–8.3)
SODIUM SERPL-SCNC: 140 MMOL/L (ref 135–145)
T4 FREE SERPL-MCNC: 0.89 NG/DL (ref 0.9–1.7)
TSH SERPL DL<=0.005 MIU/L-ACNC: 7.13 UIU/ML (ref 0.3–4.2)

## 2024-07-29 LAB — MAGNESIUM SERPL-MCNC: 2 MG/DL (ref 1.7–2.3)

## 2024-08-05 ENCOUNTER — TRANSFERRED RECORDS (OUTPATIENT)
Dept: HEALTH INFORMATION MANAGEMENT | Facility: CLINIC | Age: 74
End: 2024-08-05

## 2024-08-14 ENCOUNTER — OFFICE VISIT (OUTPATIENT)
Dept: OTOLARYNGOLOGY | Facility: CLINIC | Age: 74
End: 2024-08-14
Payer: MEDICARE

## 2024-08-14 ENCOUNTER — THERAPY VISIT (OUTPATIENT)
Dept: SPEECH THERAPY | Facility: CLINIC | Age: 74
End: 2024-08-14
Payer: MEDICARE

## 2024-08-14 VITALS — BODY MASS INDEX: 23.48 KG/M2 | HEIGHT: 70 IN | WEIGHT: 164 LBS

## 2024-08-14 DIAGNOSIS — I73.9 CLAUDICATION (H): ICD-10-CM

## 2024-08-14 DIAGNOSIS — C01 MALIGNANT NEOPLASM OF BASE OF TONGUE (H): Primary | ICD-10-CM

## 2024-08-14 DIAGNOSIS — C01 PRIMARY SQUAMOUS CELL CARCINOMA OF BASE OF TONGUE (H): Primary | ICD-10-CM

## 2024-08-14 DIAGNOSIS — I89.0 LYMPHEDEMA: ICD-10-CM

## 2024-08-14 DIAGNOSIS — R13.12 OROPHARYNGEAL DYSPHAGIA: ICD-10-CM

## 2024-08-14 PROCEDURE — 31575 DIAGNOSTIC LARYNGOSCOPY: CPT | Performed by: OTOLARYNGOLOGY

## 2024-08-14 PROCEDURE — 92526 ORAL FUNCTION THERAPY: CPT | Mod: GN | Performed by: SPEECH-LANGUAGE PATHOLOGIST

## 2024-08-14 PROCEDURE — 99213 OFFICE O/P EST LOW 20 MIN: CPT | Mod: 25 | Performed by: OTOLARYNGOLOGY

## 2024-08-14 NOTE — PROGRESS NOTES
[facility]  August 14, 2024    History of Present Illness:  Donovan Yi is a 74 year old male with a past medical history of Left Base of Tongue p16+ SCC, T3N0 with completion of chemoradiation therapy.  presents today to clinic for follow up. He expresses concerns about dry mouth and notes that his sense of taste has improved since completing chemoradiation therapy. He also mentions swallowing difficulties, describing a sensation of heaviness. At present visit denies tongue pain, bleeding, lumps, swelling, dysphagia, fever, recent illness or infection.     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     Past Medical History:  Past Medical History:   Diagnosis Date    Food intolerance in adult     Hypertension     Malignant neoplasm of base of tongue (H)        Past Surgical History:  Past Surgical History:   Procedure Laterality Date    HC REMOVAL GALLBLADDER      Description: Cholecystectomy;  Recorded: 12/06/2011;       Medications:  Current Outpatient Medications:     aspirin (ASA) 325 MG tablet, Take 1 tablet by mouth, Disp: , Rfl:     cyclobenzaprine (FLEXERIL) 10 MG tablet, Take 10 mg by mouth 3 times daily as needed, Disp: , Rfl:     HYDROcodone-acetaminophen (NORCO)  MG per tablet, , Disp: , Rfl:     lisinopril (ZESTRIL) 10 MG tablet, Take 1 tablet (10 mg) by mouth daily, Disp: 90 tablet, Rfl: 0    Multiple Vitamins-Minerals (ONE-A-DAY 50 PLUS PO), Take 1 tablet by mouth daily, Disp: , Rfl:     Probiotic Product (PROBIOTIC BLEND PO), Take 2 capsules by mouth daily Prebiotic and Probiotic blend, Disp: , Rfl:     rosuvastatin (CRESTOR) 20 MG tablet, Take 1 tablet by mouth at bedtime, Disp: , Rfl:     sodium fluoride dental gel (PREVIDENT) 1.1 % GEL topical gel, USE AS DIRECTED  OVERNIGHT. DISPENSE A SMALL AMOUNT INTO UPPER AND LOWER TRAYS. DO NOT EAT OR DRINK AFTER, Disp: , Rfl:     tamsulosin (FLOMAX) 0.4 MG capsule, Take 1 capsule (0.4 mg) by mouth daily, Disp: 90 capsule, Rfl: 1  No current facility-administered medications for this visit.    Facility-Administered Medications Ordered in Other Visits:     barium sulfate (EZ-DISK) tablet 700 mg, 700 mg, Oral, Once, Rodrigue Garcia MD    barium sulfate (VARIBAR THIN Liquid) 40 % oral suspension 24 g, 60 mL, Oral, Once, Rodrigue Garcia MD    barium sulfate (VARIBAR) 40 % pudding/paste 30 mL, 30 mL, Oral, Once, Rodrigue Garcia MD    barium sulfate 40% (VARIBAR NECTAR) oral suspension, , Oral, Once, Rodrigue Garcia MD    Allergies:  Allergies   Allergen Reactions    Other Food Allergy Unknown     Many food intolerances       Social History:  Social History     Tobacco Use    Smoking status: Former     Current packs/day: 0.00     Average packs/day: 1 pack/day for 18.0 years (18.0 ttl pk-yrs)     Types: Cigarettes     Start date: 1966     Quit date: 1984     Years since quittin.6     Passive exposure: Never    Smokeless tobacco: Never   Substance Use Topics    Alcohol use: Not on file       Family History: No family history of head/neck malignancy, thyroid problems or bleeding/clotting disorders.    Review of Systems:  10-point review of systems was negative, unless otherwise noted in HPI.    Physical Exam:  GENERAL: Alert, comfortable appearance  EYES: EOMI, clear sclera  NOSE: no anterior nasal drainage  ORAL: moist, tongue is soft with good mobility, thick white coating on tongue with very dry oral cavity, soft, symmetric palatal elevation.  NECK: anterior neck felt firm and thickened skin on palpation. No warmth or redness noted.  RESP: Breathing comfortably on room air, no stridor    Flexible Laryngoscopy:  Due to history of left base of tongue p16+ SCC, T3N0 , fiberoptic laryngoscopy was  indicated. After obtaining verbal consent, the nose was topically decongested and anesthetized. The fiberoptic laryngoscope was passed under endoscopic vision through the right nasal passage. The nasal cavity was patent, the turbinates were normal. The nasopharynx was clear. The eustachian tubes and fossa of Rosenmueller were clear. The posterior pharyngeal wall, base of tongue, vallecula appeared normal. The epiglottis was swollen. Bilateral AE folds, arytenoids, false vocal folds were normal. The larynx was clear with true vocal cords mobile bilaterally. No masses or lesions visualized. No pooling in the hypopharynx. The patient tolerated the procedure well.       Assessment & Plan:  Donovan Yi is a 74 year old male with a past medical history of left base of tongue p16+ SCC, T3N0 . He presents today for follow up. Patient presents with no distress. Examination of oral cavity was unremarkable except for swelling in epiglottitis. Considering patients history of chemoradiation, neck symptoms and physical exam, likely lymphedema.      Plan:  -Order PET Scan for 3 months from therapy completion  -Discussed lymphedema therapy   -SLP evaluation  -Follow up with office in 2 months     IAlbert MD, was present with the medical student who participated in the service and in the documentation of the note.  I have verified the history and personally performed the physical exam and medical decision making.  I agree with the assessment and plan of care as documented in the note.     I, Albert Pimentel MD, saw this patient with the resident/fellow and agree with the resident's findings and plan of care as documented in the resident's/fellow's note. I was present with the patient for the entire viewing portion of the endoscopy procedure (including scope insertion and withdrawal) and agree with the interpretation and report as documented by the resident.

## 2024-08-14 NOTE — PROGRESS NOTES
[facility]                                                                                   OUTPATIENT SPEECH LANGUAGE PATHOLOGY    PLAN OF TREATMENT FOR OUTPATIENT REHABILITATION   Patient's Last Name, First Name, Donovan Robb  JUAN YOB: 1950   Provider's Name   [facility]   Medical Record No.  [id number]     Onset Date: (P) 03/28/24 Start of Care Date: (P) 03/28/24     Medical Diagnosis:  (P) Primary squamous cell carcinoma of the base of tongue      SLP Treatment Diagnosis: (P) Mild oropharyngeal dysphagia expected to worsen to moderately severe during chemoXRT  Plan of Treatment  Frequency/Duration: (P) 4x/month for 3 months then 1-2x month  / (P) 12 months     Certification date from (P) 06/26/24   To (P) 09/24/24          See note for plan of treatment details and functional goals     AILIN Alexandra                         I CERTIFY THE NEED FOR THESE SERVICES FURNISHED UNDER        THIS PLAN OF TREATMENT AND WHILE UNDER MY CARE     (Physician attestation of this document indicates review and certification of the therapy plan).              Referring Provider:  Dr. Albert Pimentel    Initial Assessment  See Epic Evaluation- (P) 03/28/24 08/14/24 8179   Appointment Info   Treating Provider Gregoria Sinclair MA, CCC-SLP   Visits Used 3   Medical Diagnosis Primary squamous cell carcinoma of the base of tongue   SLP Tx Diagnosis Mild oropharyngeal dysphagia expected to worsen to moderately severe during chemoXRT   Progress Note/Certification   Start Of Care Date 03/28/24   Onset Of Illness/injury Or Date Of Surgery 03/28/24   Therapy Frequency 4x/month for 3 months then 1-2x month   Predicted Duration 12 months   Certification date from 06/26/24   Certification date to 09/24/24   Subjective Report   Subjective Report Pt presents today with his wife via video visit. Reports mouthsores are very painful so he is not  talking and writing to communicate.   SLP Goals   SLP Goals 1;2   SLP Goal 1   Goal Identifier PO   Goal Description 1. Pt will tolerate regular, moist textures and thin liquids with no overt clinical s/sx of penetration/aspiration in 100% of PO trials.   Rationale To maximize safety, ease and/or independence of oral intake   Goal Progress Pt reports swallowing is going fairly well. He is struggling with dry mouth especially at night. Discussed option for xylimelts; he will look into this. Pt reports ongoing, but improving dysgeusia. Trained pt on typical trajectory of dysgeusia and importance of trying to eat a variety of flavors. Reports no issues with drinking water.   Target Date 09/24/24   SLP Goal 2   Goal Identifier Exercise   Goal Description 2. Pt will maximize swallow function by completing 10 reps of 5/5 oropharyngeal and jaw strengthening/ROM exercises daily with 100% accuracy.   Rationale To maximize safety, ease and/or independence of oral intake   Goal Progress Not discussed today   Target Date 09/24/24   Treatment Interventions (SLP)   Treatment Interventions Treatment Swallow/Oral dysfunction   Treatment Swallow/Oral dysfunction   Treatment of Swallowing Dysfunction &/or Oral Function for Feeding Minutes (61838) 14 Minutes   Skilled Intervention Educated patient on swallowing strategies.;Provided written and verbal information on diet modifications.;Educated patient on risks of aspiration;Assessed oral intake trials  (trained exercises)   Patient Response/Progress Pt demonstrated and verbalized understanding of all material provided   Education   Learner/Method Patient;Listening;No Barriers to Learning   Plan   Plan for next session f/u in conjunction with ENT clinic visit to reassess PO intake/tolerance and home exercise program reintroduction

## 2024-08-14 NOTE — PATIENT INSTRUCTIONS
1. Please follow-up in clinic in 2 months. Please arrange PET scan in 2 weeks.   2. Please call the ENT clinic with any questions,concerns, new or worsening symptoms.    -Clinic number is [phone]   - [facility] direct line (Dr. Pimentel's nurse) [phone]  3. A referral has been placed for you for lymphedema. You will receive a call from rehab schedulers to arrange your first appointment.   If you have not heard from scheduling within 3 business days, please call [phone] to arrange.

## 2024-08-14 NOTE — LETTER
8/14/2024       RE: Donovan Yi  [address] [address]     Dear Colleague,    Thank you for referring your patient, Donovan Yi, to the [facility] [place] [facility]. Please see a copy of my visit note below.    Otolaryngology Head and Neck Surgery Clinic  August 14, 2024    History of Present Illness:  Donovan Yi is a 74 year old male with a past medical history of Left Base of Tongue p16+ SCC, T3N0 with completion of chemoradiation therapy.  presents today to clinic for follow up. He expresses concerns about dry mouth and notes that his sense of taste has improved since completing chemoradiation therapy. He also mentions swallowing difficulties, describing a sensation of heaviness. At present visit denies tongue pain, bleeding, lumps, swelling, dysphagia, fever, recent illness or infection.     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     Past Medical History:  Past Medical History:   Diagnosis Date     Food intolerance in adult      Hypertension      Malignant neoplasm of base of tongue (H)        Past Surgical History:  Past Surgical History:   Procedure Laterality Date     HC REMOVAL GALLBLADDER      Description: Cholecystectomy;  Recorded: 12/06/2011;       Medications:  Current Outpatient Medications:      aspirin (ASA) 325 MG tablet, Take 1 tablet by mouth, Disp: , Rfl:      cyclobenzaprine (FLEXERIL) 10 MG tablet, Take 10 mg by mouth 3 times daily as needed, Disp: , Rfl:      HYDROcodone-acetaminophen (NORCO)  MG per tablet, , Disp: , Rfl:      lisinopril (ZESTRIL) 10 MG tablet, Take 1 tablet (10 mg) by mouth daily, Disp: 90 tablet, Rfl: 0     Multiple Vitamins-Minerals (ONE-A-DAY 50  PLUS PO), Take 1 tablet by mouth daily, Disp: , Rfl:      Probiotic Product (PROBIOTIC BLEND PO), Take 2 capsules by mouth daily Prebiotic and Probiotic blend, Disp: , Rfl:      rosuvastatin (CRESTOR) 20 MG tablet, Take 1 tablet by mouth at bedtime, Disp: , Rfl:      sodium fluoride dental gel (PREVIDENT) 1.1 % GEL topical gel, USE AS DIRECTED OVERNIGHT. DISPENSE A SMALL AMOUNT INTO UPPER AND LOWER TRAYS. DO NOT EAT OR DRINK AFTER, Disp: , Rfl:      tamsulosin (FLOMAX) 0.4 MG capsule, Take 1 capsule (0.4 mg) by mouth daily, Disp: 90 capsule, Rfl: 1  No current facility-administered medications for this visit.    Facility-Administered Medications Ordered in Other Visits:      barium sulfate (EZ-DISK) tablet 700 mg, 700 mg, Oral, Once, Rodrigue Garcia MD     barium sulfate (VARIBAR THIN Liquid) 40 % oral suspension 24 g, 60 mL, Oral, Once, Rodrigue Garcia MD     barium sulfate (VARIBAR) 40 % pudding/paste 30 mL, 30 mL, Oral, Once, Rodrigue Garcia MD     barium sulfate 40% (VARIBAR NECTAR) oral suspension, , Oral, Once, Rodrigue Garcia MD    Allergies:  Allergies   Allergen Reactions     Other Food Allergy Unknown     Many food intolerances       Social History:  Social History     Tobacco Use     Smoking status: Former     Current packs/day: 0.00     Average packs/day: 1 pack/day for 18.0 years (18.0 ttl pk-yrs)     Types: Cigarettes     Start date: 1966     Quit date: 1984     Years since quittin.6     Passive exposure: Never     Smokeless tobacco: Never   Substance Use Topics     Alcohol use: Not on file       Family History: No family history of head/neck malignancy, thyroid problems or bleeding/clotting disorders.    Review of Systems:  10-point review of systems was negative, unless otherwise noted in HPI.    Physical Exam:  GENERAL: Alert, comfortable appearance  EYES: EOMI, clear sclera  NOSE: no anterior nasal drainage  ORAL: moist, tongue is soft with good  mobility, thick white coating on tongue with very dry oral cavity, soft, symmetric palatal elevation.  NECK: anterior neck felt firm and thickened skin on palpation. No warmth or redness noted.  RESP: Breathing comfortably on room air, no stridor    Flexible Laryngoscopy:  Due to history of left base of tongue p16+ SCC, T3N0 , fiberoptic laryngoscopy was indicated. After obtaining verbal consent, the nose was topically decongested and anesthetized. The fiberoptic laryngoscope was passed under endoscopic vision through the right nasal passage. The nasal cavity was patent, the turbinates were normal. The nasopharynx was clear. The eustachian tubes and fossa of Rosenmueller were clear. The posterior pharyngeal wall, base of tongue, vallecula appeared normal. The epiglottis was swollen. Bilateral AE folds, arytenoids, false vocal folds were normal. The larynx was clear with true vocal cords mobile bilaterally. No masses or lesions visualized. No pooling in the hypopharynx. The patient tolerated the procedure well.       Assessment & Plan:  Donovan Yi is a 74 year old male with a past medical history of left base of tongue p16+ SCC, T3N0 . He presents today for follow up. Patient presents with no distress. Examination of oral cavity was unremarkable except for swelling in epiglottitis. Considering patients history of chemoradiation, neck symptoms and physical exam, likely lymphedema.      Plan:  -Order PET Scan for 3 months from therapy completion  -Discussed lymphedema therapy   -SLP evaluation  -Follow up with office in 2 months     IAlbert MD, was present with the medical student who participated in the service and in the documentation of the note.  I have verified the history and personally performed the physical exam and medical decision making.  I agree with the assessment and plan of care as documented in the note.     IAlbert MD, saw this patient with the resident/fellow  and agree with the resident's findings and plan of care as documented in the resident's/fellow's note. I was present with the patient for the entire viewing portion of the endoscopy procedure (including scope insertion and withdrawal) and agree with the interpretation and report as documented by the resident.          Again, thank you for allowing me to participate in the care of your patient.      Sincerely,    Albert Pimentel MD

## 2024-08-27 ENCOUNTER — VIRTUAL VISIT (OUTPATIENT)
Dept: SPEECH THERAPY | Facility: CLINIC | Age: 74
End: 2024-08-27
Payer: MEDICARE

## 2024-08-27 DIAGNOSIS — C01 PRIMARY SQUAMOUS CELL CARCINOMA OF BASE OF TONGUE (H): Primary | ICD-10-CM

## 2024-08-27 DIAGNOSIS — R13.12 OROPHARYNGEAL DYSPHAGIA: ICD-10-CM

## 2024-08-27 PROCEDURE — 92526 ORAL FUNCTION THERAPY: CPT | Mod: GN | Performed by: SPEECH-LANGUAGE PATHOLOGIST

## 2024-09-05 ENCOUNTER — TELEPHONE (OUTPATIENT)
Dept: OTOLARYNGOLOGY | Facility: CLINIC | Age: 74
End: 2024-09-05
Payer: MEDICARE

## 2024-09-09 ENCOUNTER — TRANSFERRED RECORDS (OUTPATIENT)
Dept: HEALTH INFORMATION MANAGEMENT | Facility: CLINIC | Age: 74
End: 2024-09-09
Payer: MEDICARE

## 2024-09-10 ENCOUNTER — THERAPY VISIT (OUTPATIENT)
Dept: PHYSICAL THERAPY | Facility: REHABILITATION | Age: 74
End: 2024-09-10
Attending: OTOLARYNGOLOGY
Payer: MEDICARE

## 2024-09-10 DIAGNOSIS — C01 MALIGNANT NEOPLASM OF BASE OF TONGUE (H): ICD-10-CM

## 2024-09-10 DIAGNOSIS — I89.0 LYMPHEDEMA: ICD-10-CM

## 2024-09-10 PROCEDURE — 97140 MANUAL THERAPY 1/> REGIONS: CPT | Mod: GP | Performed by: PHYSICAL THERAPIST

## 2024-09-10 PROCEDURE — 97110 THERAPEUTIC EXERCISES: CPT | Mod: GP | Performed by: PHYSICAL THERAPIST

## 2024-09-10 PROCEDURE — 97161 PT EVAL LOW COMPLEX 20 MIN: CPT | Mod: GP | Performed by: PHYSICAL THERAPIST

## 2024-09-10 NOTE — PROGRESS NOTES
PHYSICAL THERAPY EVALUATION  Type of Visit: Evaluation              Subjective       Presenting condition or subjective complaint: Edema  Date of onset: 07/01/24    Pt reports having a tumor L base of tongue and had chemoradiation therapy for 35 treatments from 4/3-5/31/24 - and a couple of weeks after radiation he started to notice some swelling in his throat.  Prior to starting radiation pt had a tooth pulled but there was a part of a tooth that was still present that was cutting up his tongue which got worse with radiation - but pt finally was able to have that taken care of and tongue feels like it is pretty healed up - thinks he is having his taste buds coming back - but is struggling with dry mouth still.   Pt hasn't had a PET scan yet (was scheduled 3 weeks ago) because he has been struggling when he lays flat he feels like his airway is get caught off due to the swelling in his throat.  Pt reports increased swelling has been present for about 1 month.   Pt has started to do a little self MLD at home and feels like that has been somewhat helpful so far.   Pt works out at gym 3x/week for core, light dumbbell work for upper body at home, and pt tries to walk everyday on treadmill for 20 minutes with highest incline.   Pt also has PAD that he will be having addressed with MD again soon.   Relevant medical history: Cancer   Dates & types of surgery:      Prior diagnostic imaging/testing results:       Prior therapy history for the same diagnosis, illness or injury: No      Prior Level of Function  Transfers: Independent  Ambulation: Independent  ADL: Independent      Living Environment  Social support: With a significant other or spouse   Type of home: House   Stairs to enter the home: No       Ramp: No   Stairs inside the home: No       Help at home: None  Equipment owned: Straight Cane     Employment: No    Hobbies/Interests:      Patient goals for therapy:      Pain assessment:  0/10     Objective       EDEMA  EVALUATION  Additional history:  Body part affected by edema: Head and neck  If cancer related, treatment: Radiation  If not cancer related, problems with veins or cause of swelling:    Distance able to walk: ?  Time able to stand: 10 mins  Sensation problems in hands/feet: No    Edema etiology: Chemo, Radiation    FUNCTIONAL SCALES   Lymphedema Life Impact Scale (LLIS): not filled out today     Cognitive Status Examination  Orientation: Oriented to person, place and time   Level of Consciousness: Alert  Follows Commands and Answers Questions: 100% of the time  Personal Safety and Judgement: Intact  Memory: Intact    EDEMA  Skin Condition: Intact, Pitting  Scar: No  Capillary Refill:  Appears symmetrical  Ulceration: No    GIRTH MEASUREMENTS: Refer to separate girth measurement flowsheet for specific measurements.    Head/Neck Volume: Superior neck 46.5, middle 44.5 and inferior 42.8 cms    RANGE OF MOTION:  Cervical ROM WFL  STRENGTH: UE Strength WFL, facial muscles with WNL power without asymmetry noted  POSTURE:  Forward head, rounded shoulders, increased thoracic kyphosis  PALPATION: Pitting edema noted only through anterior neck mandible to collar bone - wave foam indentation with on for only 2-3 minutes  ACTIVITIES OF DAILY LIVING: WFL    TRANSFERS: WFL  GAIT/LOCOMOTION: WFL  BALANCE: WFL    Assessment & Plan   CLINICAL IMPRESSIONS  Medical Diagnosis: Malignant neoplasm of base of tongue (H) (C01)    Lymphedema (I89.0)    Treatment Diagnosis: Malignant neoplasm of base of tongue (H) (C01)    Lymphedema (I89.0)   Impression/Assessment: Patient is a 74 year old male with anterior neck swelling complaints after completing chemoradiation therapy this summer for L tongue cancer.  The following significant findings have been identified: Impaired sensation, Edema, and Impaired posture. These impairments interfere with their ability to perform self care tasks as compared to previous level of function.     Clinical  Decision Making (Complexity):  Clinical Presentation: Stable/Uncomplicated  Clinical Presentation Rationale: based on medical and personal factors listed in PT evaluation  Clinical Decision Making (Complexity): Low complexity    PLAN OF CARE  Treatment Interventions:  Interventions: Manual Therapy, Neuromuscular Re-education, Therapeutic Activity, Therapeutic Exercise, Self-Care/Home Management, Gradient Compression Bandaging    Long Term Goals     PT Goal 1  Goal Description: Pt will be able to have decreased girth around throat swelling by 3-5 cm for improved ability to lay down - especially for PET scan in 8-12 weeks.  Target Date: 12/07/24      Frequency of Treatment: 1-2x/week  Duration of Treatment: 6-12 weeks    Risks and benefits of evaluation/treatment have been explained.   Patient/Family/caregiver agrees with Plan of Care.     Evaluation Time:     PT Eval, Low Complexity Minutes (35312): 20      Signing Clinician: Janet Peña PT, DPT, CLT    [facility]                                                                                   OUTPATIENT PHYSICAL THERAPY      PLAN OF TREATMENT FOR OUTPATIENT REHABILITATION   Patient's Last Name, First Name, Donovan Robb YOB: 1950   Provider's Name   [facility]   Medical Record No.  [id number]     Onset Date: 07/01/24  Start of Care Date: 09/10/24     Medical Diagnosis:  Malignant neoplasm of base of tongue (H) (C01)    Lymphedema (I89.0)      PT Treatment Diagnosis:  Malignant neoplasm of base of tongue (H) (C01)    Lymphedema (I89.0) Plan of Treatment  Frequency/Duration: 1-2x/week/ 6-12 weeks    Certification date from 09/10/24 to 12/07/24         See note for plan of treatment details and functional goals     Janet Peña PT, DPT, CLT                         I CERTIFY THE NEED FOR THESE SERVICES FURNISHED UNDER        THIS PLAN OF TREATMENT AND WHILE UNDER MY CARE      (Physician attestation of this document indicates review and certification of the therapy plan).              Referring Provider/PCP:  Albert Pimentel MD/Molly MARADIAGA CNP    Initial Assessment  See Epic Evaluation- Start of Care Date: 09/10/24

## 2024-09-12 PROBLEM — C01 MALIGNANT NEOPLASM OF BASE OF TONGUE (H): Status: ACTIVE | Noted: 2024-09-12

## 2024-09-12 PROBLEM — I89.0 LYMPHEDEMA: Status: ACTIVE | Noted: 2024-09-12

## 2024-09-13 ENCOUNTER — THERAPY VISIT (OUTPATIENT)
Dept: PHYSICAL THERAPY | Facility: REHABILITATION | Age: 74
End: 2024-09-13
Payer: MEDICARE

## 2024-09-13 DIAGNOSIS — C01 MALIGNANT NEOPLASM OF BASE OF TONGUE (H): Primary | ICD-10-CM

## 2024-09-13 DIAGNOSIS — I89.0 LYMPHEDEMA: ICD-10-CM

## 2024-09-13 PROCEDURE — 97140 MANUAL THERAPY 1/> REGIONS: CPT | Mod: GP | Performed by: PHYSICAL THERAPIST

## 2024-09-21 ENCOUNTER — MYC MEDICAL ADVICE (OUTPATIENT)
Dept: FAMILY MEDICINE | Facility: CLINIC | Age: 74
End: 2024-09-21
Payer: MEDICARE

## 2024-09-21 DIAGNOSIS — R35.1 NOCTURIA: ICD-10-CM

## 2024-09-23 RX ORDER — TAMSULOSIN HYDROCHLORIDE 0.4 MG/1
0.8 CAPSULE ORAL DAILY
Qty: 180 CAPSULE | Refills: 1 | Status: SHIPPED | OUTPATIENT
Start: 2024-09-23

## 2024-10-03 ENCOUNTER — THERAPY VISIT (OUTPATIENT)
Dept: PHYSICAL THERAPY | Facility: REHABILITATION | Age: 74
End: 2024-10-03
Payer: MEDICARE

## 2024-10-03 ENCOUNTER — TRANSFERRED RECORDS (OUTPATIENT)
Dept: HEALTH INFORMATION MANAGEMENT | Facility: CLINIC | Age: 74
End: 2024-10-03

## 2024-10-03 DIAGNOSIS — C01 MALIGNANT NEOPLASM OF BASE OF TONGUE (H): Primary | ICD-10-CM

## 2024-10-03 DIAGNOSIS — I89.0 LYMPHEDEMA: ICD-10-CM

## 2024-10-03 PROCEDURE — 97140 MANUAL THERAPY 1/> REGIONS: CPT | Mod: GP | Performed by: PHYSICAL THERAPIST

## 2024-10-03 PROCEDURE — 97110 THERAPEUTIC EXERCISES: CPT | Mod: GP | Performed by: PHYSICAL THERAPIST

## 2024-10-03 NOTE — PATIENT INSTRUCTIONS
Can try paint roller   OR something like this        NEW EXERCISE    Try tightening the neck muscle with making a grimace x10 reps 2-3x/day    Any skin technique that moves the skin always finishing with sweeping down towards collar bones

## 2024-10-21 ENCOUNTER — MYC MEDICAL ADVICE (OUTPATIENT)
Dept: OTOLARYNGOLOGY | Facility: CLINIC | Age: 74
End: 2024-10-21
Payer: MEDICARE

## 2024-10-21 ENCOUNTER — PATIENT OUTREACH (OUTPATIENT)
Dept: OTOLARYNGOLOGY | Facility: CLINIC | Age: 74
End: 2024-10-21
Payer: MEDICARE

## 2024-10-21 DIAGNOSIS — C01 MALIGNANT NEOPLASM OF BASE OF TONGUE (H): Primary | ICD-10-CM

## 2024-10-21 NOTE — TELEPHONE ENCOUNTER
Called and left message for patient as he canceled his follow-up with Dr. Pimentel for this week. Encouraged patient return call to discuss rescheduling follow-up and plan for imaging as patient has cancelled his follow-up PET scan as well. Left direct line and encouraged patient return call to discuss.     Laury Walters, RN, BSN

## 2024-10-31 NOTE — TELEPHONE ENCOUNTER
Left Voicemail (1st Attempt) for the patient to call back and schedule the following:    Appointment type: CT chest/neck  Provider: ordered by Lroen  Should be scheduled at any [facility] location  Return date: ASAP      Specialty phone number: imaging    AND     Appointment type: return ent  Provider: Loren  Return date: after Cts, okay to overbook per Laury      Specialty phone number: writer's direct  Additional appointment(s) needed: n/a  Additional Notes: follow up after CTs      Becky Swain on 10/31/2024 at 12:38 PM

## 2024-11-05 ENCOUNTER — THERAPY VISIT (OUTPATIENT)
Dept: PHYSICAL THERAPY | Facility: REHABILITATION | Age: 74
End: 2024-11-05
Payer: MEDICARE

## 2024-11-05 DIAGNOSIS — I89.0 LYMPHEDEMA: ICD-10-CM

## 2024-11-05 DIAGNOSIS — C01 MALIGNANT NEOPLASM OF BASE OF TONGUE (H): Primary | ICD-10-CM

## 2024-11-05 PROCEDURE — 97140 MANUAL THERAPY 1/> REGIONS: CPT | Mod: GP | Performed by: PHYSICAL THERAPIST

## 2024-12-02 ENCOUNTER — TRANSFERRED RECORDS (OUTPATIENT)
Dept: HEALTH INFORMATION MANAGEMENT | Facility: CLINIC | Age: 74
End: 2024-12-02
Payer: MEDICARE

## 2024-12-04 ENCOUNTER — HOSPITAL ENCOUNTER (OUTPATIENT)
Dept: CT IMAGING | Facility: HOSPITAL | Age: 74
Discharge: HOME | End: 2024-12-04
Attending: OTOLARYNGOLOGY
Payer: MEDICARE

## 2024-12-04 DIAGNOSIS — C01 MALIGNANT NEOPLASM OF BASE OF TONGUE (H): ICD-10-CM

## 2024-12-04 LAB
CREAT BLD-MCNC: 1.2 MG/DL (ref 0.7–1.3)
EGFRCR SERPLBLD CKD-EPI 2021: >60 ML/MIN/1.73M2

## 2024-12-04 PROCEDURE — G1010 CDSM STANSON: HCPCS

## 2024-12-04 PROCEDURE — 71260 CT THORAX DX C+: CPT | Mod: MG

## 2024-12-04 PROCEDURE — 250N000011 HC RX IP 250 OP 636: Performed by: OTOLARYNGOLOGY

## 2024-12-04 PROCEDURE — 70491 CT SOFT TISSUE NECK W/DYE: CPT | Mod: MG

## 2024-12-04 PROCEDURE — 82565 ASSAY OF CREATININE: CPT

## 2024-12-04 PROCEDURE — 999N000248 HC STATISTIC IV INSERT WITH US BY RN

## 2024-12-04 RX ORDER — IOPAMIDOL 755 MG/ML
90 INJECTION, SOLUTION INTRAVASCULAR ONCE
Status: COMPLETED | OUTPATIENT
Start: 2024-12-04 | End: 2024-12-04

## 2024-12-04 RX ADMIN — IOPAMIDOL 90 ML: 755 INJECTION, SOLUTION INTRAVENOUS at 15:43

## 2025-01-02 ENCOUNTER — TRANSFERRED RECORDS (OUTPATIENT)
Dept: HEALTH INFORMATION MANAGEMENT | Facility: CLINIC | Age: 75
End: 2025-01-02
Payer: MEDICARE

## 2025-03-07 ENCOUNTER — TRANSFERRED RECORDS (OUTPATIENT)
Dept: HEALTH INFORMATION MANAGEMENT | Facility: CLINIC | Age: 75
End: 2025-03-07
Payer: MEDICARE

## 2025-04-03 ENCOUNTER — TRANSFERRED RECORDS (OUTPATIENT)
Dept: HEALTH INFORMATION MANAGEMENT | Facility: CLINIC | Age: 75
End: 2025-04-03
Payer: MEDICARE

## 2025-04-06 ENCOUNTER — MYC REFILL (OUTPATIENT)
Dept: FAMILY MEDICINE | Facility: CLINIC | Age: 75
End: 2025-04-06
Payer: MEDICARE

## 2025-04-06 DIAGNOSIS — R35.1 NOCTURIA: ICD-10-CM

## 2025-04-07 RX ORDER — TAMSULOSIN HYDROCHLORIDE 0.4 MG/1
0.8 CAPSULE ORAL DAILY
Qty: 180 CAPSULE | Refills: 1 | Status: SHIPPED | OUTPATIENT
Start: 2025-04-07

## 2025-04-14 ENCOUNTER — TRANSFERRED RECORDS (OUTPATIENT)
Dept: HEALTH INFORMATION MANAGEMENT | Facility: CLINIC | Age: 75
End: 2025-04-14
Payer: MEDICARE

## 2025-04-15 ENCOUNTER — OFFICE VISIT (OUTPATIENT)
Dept: FAMILY MEDICINE | Facility: CLINIC | Age: 75
End: 2025-04-15
Payer: MEDICARE

## 2025-04-15 VITALS
RESPIRATION RATE: 16 BRPM | BODY MASS INDEX: 25.82 KG/M2 | HEART RATE: 72 BPM | WEIGHT: 180.38 LBS | TEMPERATURE: 98.4 F | DIASTOLIC BLOOD PRESSURE: 83 MMHG | SYSTOLIC BLOOD PRESSURE: 151 MMHG | OXYGEN SATURATION: 98 % | HEIGHT: 70 IN

## 2025-04-15 DIAGNOSIS — Z13.1 SCREENING FOR DIABETES MELLITUS: ICD-10-CM

## 2025-04-15 DIAGNOSIS — E03.9 HYPOTHYROIDISM, UNSPECIFIED TYPE: ICD-10-CM

## 2025-04-15 DIAGNOSIS — I10 ESSENTIAL HYPERTENSION: ICD-10-CM

## 2025-04-15 DIAGNOSIS — M54.51 VERTEBROGENIC LOW BACK PAIN: ICD-10-CM

## 2025-04-15 DIAGNOSIS — R35.1 NOCTURIA: ICD-10-CM

## 2025-04-15 DIAGNOSIS — Z12.5 SCREENING FOR PROSTATE CANCER: ICD-10-CM

## 2025-04-15 DIAGNOSIS — C01 MALIGNANT NEOPLASM OF BASE OF TONGUE (H): ICD-10-CM

## 2025-04-15 DIAGNOSIS — C01 PRIMARY SQUAMOUS CELL CARCINOMA OF BASE OF TONGUE (H): ICD-10-CM

## 2025-04-15 DIAGNOSIS — I73.9 CLAUDICATION: ICD-10-CM

## 2025-04-15 DIAGNOSIS — R55 PRE-SYNCOPE: ICD-10-CM

## 2025-04-15 DIAGNOSIS — Z01.818 PREOP GENERAL PHYSICAL EXAM: Primary | ICD-10-CM

## 2025-04-15 DIAGNOSIS — F11.20 OPIOID TYPE DEPENDENCE, CONTINUOUS (H): ICD-10-CM

## 2025-04-15 PROBLEM — M53.3 SACROILIAC PAIN: Status: RESOLVED | Noted: 2021-06-09 | Resolved: 2025-04-15

## 2025-04-15 LAB
EST. AVERAGE GLUCOSE BLD GHB EST-MCNC: 114 MG/DL
HBA1C MFR BLD: 5.6 % (ref 0–5.6)

## 2025-04-15 PROCEDURE — 83036 HEMOGLOBIN GLYCOSYLATED A1C: CPT

## 2025-04-15 PROCEDURE — 84443 ASSAY THYROID STIM HORMONE: CPT

## 2025-04-15 PROCEDURE — 80048 BASIC METABOLIC PNL TOTAL CA: CPT

## 2025-04-15 PROCEDURE — 84439 ASSAY OF FREE THYROXINE: CPT

## 2025-04-15 PROCEDURE — 3079F DIAST BP 80-89 MM HG: CPT

## 2025-04-15 PROCEDURE — G0103 PSA SCREENING: HCPCS

## 2025-04-15 PROCEDURE — 3077F SYST BP >= 140 MM HG: CPT

## 2025-04-15 PROCEDURE — 99214 OFFICE O/P EST MOD 30 MIN: CPT

## 2025-04-15 PROCEDURE — 83735 ASSAY OF MAGNESIUM: CPT

## 2025-04-15 PROCEDURE — 80061 LIPID PANEL: CPT

## 2025-04-15 PROCEDURE — 36415 COLL VENOUS BLD VENIPUNCTURE: CPT

## 2025-04-15 RX ORDER — ROSUVASTATIN CALCIUM 20 MG/1
20 TABLET, COATED ORAL AT BEDTIME
Qty: 90 TABLET | Refills: 3 | Status: SHIPPED | OUTPATIENT
Start: 2025-04-15

## 2025-04-15 ASSESSMENT — ANXIETY QUESTIONNAIRES
GAD7 TOTAL SCORE: 0
4. TROUBLE RELAXING: NOT AT ALL
3. WORRYING TOO MUCH ABOUT DIFFERENT THINGS: NOT AT ALL
5. BEING SO RESTLESS THAT IT IS HARD TO SIT STILL: NOT AT ALL
6. BECOMING EASILY ANNOYED OR IRRITABLE: NOT AT ALL
7. FEELING AFRAID AS IF SOMETHING AWFUL MIGHT HAPPEN: NOT AT ALL
GAD7 TOTAL SCORE: 0
GAD7 TOTAL SCORE: 0
7. FEELING AFRAID AS IF SOMETHING AWFUL MIGHT HAPPEN: NOT AT ALL
1. FEELING NERVOUS, ANXIOUS, OR ON EDGE: NOT AT ALL
2. NOT BEING ABLE TO STOP OR CONTROL WORRYING: NOT AT ALL

## 2025-04-15 ASSESSMENT — PATIENT HEALTH QUESTIONNAIRE - PHQ9
SUM OF ALL RESPONSES TO PHQ QUESTIONS 1-9: 1
SUM OF ALL RESPONSES TO PHQ QUESTIONS 1-9: 1
10. IF YOU CHECKED OFF ANY PROBLEMS, HOW DIFFICULT HAVE THESE PROBLEMS MADE IT FOR YOU TO DO YOUR WORK, TAKE CARE OF THINGS AT HOME, OR GET ALONG WITH OTHER PEOPLE: SOMEWHAT DIFFICULT

## 2025-04-15 NOTE — ASSESSMENT & PLAN NOTE
Specialty notes reviewed. He had imaging done early this year that showed no metastatic disease or recurrence of previous disease.

## 2025-04-15 NOTE — ASSESSMENT & PLAN NOTE
Blood pressure today elevated at 151/83. He reports home readings are on average 120/60s. Current therapies include lisinopril 10mg daily as monotherapy which he somewhat self titrates based on home readings and occasionally will hold this medication. He can continue this as prescribed prior to his planned conscious sedation.  Updated monitoring labs today.  Orders:    Basic metabolic panel  (Ca, Cl, CO2, Creat, Gluc, K, Na, BUN); Future

## 2025-04-15 NOTE — ASSESSMENT & PLAN NOTE
Patient was being seen by vascular clinic for a period of time. He hasn't seen them as he reports no recent symptoms. He is able to walk for 20 minutes without claudication symptoms. He is on daily aspirin but it appears he stopped his statin medication as he reports he was asymptomatic.  Would recommend resuming and he is amenable.  Will check cholesterol levels and send in refill on his rosuvastatin today.  Orders:    Lipid panel reflex to direct LDL Non-fasting; Future    rosuvastatin (CRESTOR) 20 MG tablet; Take 1 tablet (20 mg) by mouth at bedtime.

## 2025-04-15 NOTE — PATIENT INSTRUCTIONS
How to Take Your Medication Before Surgery  Preoperative Medication Instructions   Antiplatelet or Anticoagulation Medication Instructions   - aspirin: Discontinue aspirin 7 days prior to procedure to reduce bleeding risk. It should be resumed postoperatively.     Additional Medication Instructions  Take all scheduled medications on the day of surgery EXCEPT for modifications listed below:   - Herbal medications and vitamins: DO NOT TAKE 14 days prior to surgery.   - ACE/ARB/ARNI (lisinopril, enalapril, losartan, valsartan, olmesartan, sacubritril/valsartan) : Continue without modification (e.g., MAC anesthesia, neurosurgery, spine surgery, heart failure, or labile hypertension with risk of hypertension).   - Statins (atorvastatin, simvastatin, pravastatin) : Continue taking on the day of surgery.    - Opioids: Continue without modification.   956}       Patient Education   Preparing for Your Surgery  For Adults  Getting started  In most cases, a nurse will call to review your health history and instructions. They will give you an arrival time based on your scheduled surgery time. Please be ready to share:  Your doctor's clinic name and phone number  Your medical, surgical, and anesthesia history  A list of allergies and sensitivities  A list of medicines, including herbal treatments and over-the-counter drugs  Whether the patient has a legal guardian (ask how to send us the papers in advance)  Note: You may not receive a call if you were seen at our PAC (Preoperative Assessment Center).  Please tell us if you're pregnant--or if there's any chance you might be pregnant. Some surgeries may injure a fetus (unborn baby), so they require a pregnancy test. Surgeries that are safe for a fetus don't always need a test, and you can choose whether to have one.   Preparing for surgery  Within 10 to 30 days of surgery: Have a pre-op exam (sometimes called an H&P, or History and Physical). This can be done at a clinic or  pre-operative center.  If you're having a , you may not need this exam. Talk to your care team.  At your pre-op exam, talk to your care team about all medicines you take. (This includes CBD oil and any drugs, such as THC, marijuana, and other forms of cannabis.) If you need to stop any medicine before surgery, ask when to start taking it again.  This is for your safety. Many medicines and drugs can make you bleed too much during surgery. Some change how well surgery (anesthesia) drugs work.  Call your insurance company to let them know you're having surgery. (If you don't have insurance, call [phone].)  Call your clinic if there's any change in your health. This includes a scrape or scratch near the surgery site, or any signs of a cold (sore throat, runny nose, cough, rash, fever).  Eating and drinking guidelines  For your safety: Unless your surgeon tells you otherwise, follow the guidelines below.  Eat and drink as normal until 8 hours before you arrive for surgery. After that, no food or milk. You can spit out gum when you arrive.  Drink clear liquids until 2 hours before you arrive. These are liquids you can see through, like water, Gatorade, and Propel Water. They also include plain black coffee and tea (no cream or milk).  No alcohol for 24 hours before you arrive. The night before surgery, stop any drinks that contain THC.  If your care team tells you to take medicine on the morning of surgery, it's okay to take it with a sip of water. No other medicines or drugs are allowed (including CBD oil)--follow your care team's instructions.  If you have questions the day of surgery, call your hospital or surgery center.   Preventing infection  Shower or bathe the night before and the morning of surgery. Follow the instructions your clinic gave you. (If no instructions, use regular soap.)  Don't shave or clip hair near your surgery site. We'll remove the hair if needed.  Don't smoke or vape the morning  of surgery. No chewing tobacco for 6 hours before you arrive. A nicotine patch is okay. You may spit out nicotine gum when you arrive.  For some surgeries, the surgeon will tell you to fully quit smoking and nicotine.  We will make every effort to keep you safe from infection. We will:  Clean our hands often with soap and water (or an alcohol-based hand rub).  Clean the skin at your surgery site with a special soap that kills germs.  Give you a special gown to keep you warm. (Cold raises the risk of infection.)  Wear hair covers, masks, gowns, and gloves during surgery.  Give antibiotic medicine, if prescribed. Not all surgeries need this medicine.  What to bring on the day of surgery  Photo ID and insurance card  Copy of your health care directive, if you have one  Glasses and hearing aids (bring cases)  You can't wear contacts during surgery  Inhaler and eye drops, if you use them (tell us about these when you arrive)  CPAP machine or breathing device, if you use them  A few personal items, if spending the night  If you have . . .  A pacemaker, ICD (cardiac defibrillator), or other implant: Bring the ID card.  An implanted stimulator: Bring the remote control.  A legal guardian: Bring a copy of the certified (court-stamped) guardianship papers.  Please remove any jewelry, including body piercings. Leave jewelry and other valuables at home.  If you're going home the day of surgery  You must have a responsible adult drive you home. They should stay with you overnight as well.  If you don't have someone to stay with you, and you aren't safe to go home alone, we may keep you overnight. Insurance often won't pay for this.  After surgery  If it's hard to control your pain or you need more pain medicine, please call your surgeon's office.  Questions?   If you have any questions for your care team, list them here:    ____________________________________________________________________________________________________________________________________________________________________________________________________________________________________________________________  For informational purposes only. Not to replace the advice of your health care provider. Copyright   2003, 2019 [facility]. All rights reserved. Clinically reviewed by David Osorio MD. [employer] 408286 - REV 08/24.

## 2025-04-15 NOTE — PROGRESS NOTES
Preoperative Evaluation  [facility]  [address]  [facility] [address]  Phone: [phone]  Fax: [phone]  Primary Provider: HIREN Moody CNP  Pre-op Performing Provider: HIREN Moody CNP  Apr 15, 2025             4/15/2025   Surgical Information   What procedure is being done? Intracept low back   Facility or Hospital where procedure/surgery will be performed: [facility]   Who is doing the procedure / surgery? Staff doctor   Date of surgery / procedure: 4/23/2025   Time of surgery / procedure: 11:30 A.M.   Where do you plan to recover after surgery? at home with family     Fax number for surgical facility: [phone]    Assessment & Plan     The proposed surgical procedure is considered LOW risk.    Assessment & Plan  Preop general physical exam  Patient is aware that specific preoperative instruction and all postoperative guidance will come from his procedural team. We discussed NPO recommendations, antiseptic bathing and medication adjustments. All questions were answered.       Vertebrogenic low back pain  Indication for planned procedure. Specialty notes reviewed. Back pain has persisted despite routine physical therapy, oral medications and epidural steroid injections. He is trying to both minimize oral medications as well as avoid surgical intervention, hence the plan for this upcoming Intracept procedure.        Claudication  Patient was being seen by vascular clinic for a period of time. He hasn't seen them as he reports no recent symptoms. He is able to walk for 20 minutes without claudication symptoms. He is on daily aspirin but it appears he stopped his statin medication as he reports he was asymptomatic.  Would recommend resuming and he is amenable.  Will check cholesterol levels and send in refill on his rosuvastatin today.  Orders:    Lipid panel reflex to direct LDL Non-fasting; Future    rosuvastatin (CRESTOR) 20 MG  tablet; Take 1 tablet (20 mg) by mouth at bedtime.    Essential hypertension  Blood pressure today elevated at 151/83. He reports home readings are on average 120/60s. Current therapies include lisinopril 10mg daily as monotherapy which he somewhat self titrates based on home readings and occasionally will hold this medication. He can continue this as prescribed prior to his planned conscious sedation.  Updated monitoring labs today.  Orders:    Basic metabolic panel  (Ca, Cl, CO2, Creat, Gluc, K, Na, BUN); Future    Opioid type dependence, continuous (H)  Managed by [facility]. Notes reviewed.          Primary squamous cell carcinoma of base of tongue (H)  Specialty notes reviewed. He had imaging done early this year that showed no metastatic disease or recurrence of previous disease.         Malignant neoplasm of base of tongue (H)  Specialty notes reviewed. He had imaging done early this year that showed no metastatic disease or recurrence of previous disease.         Nocturia  Symptoms increasingly bothersome to patient. He is on maximum dose tamsulosin. He denies any hesitation or urgency, just the frequency of which he is needing to urinate. Will update labs (A1C and PSA) and have him consult with urology.  Discussed I think some of this could be attributed to the fact that he does consume a decent amount of fluids throughout the day and throughout the night however he is hesitant to decrease his use given his persistent dry mouth secondary to oral cancer.  Orders:    Adult Urology  Referral; Future    Screening for diabetes mellitus    Orders:    Hemoglobin A1c; Future    Screening for prostate cancer    Orders:    PSA, screen; Future              - No identified additional risk factors other than previously addressed    Preoperative Medication Instructions  Antiplatelet or Anticoagulation Medication Instructions   - aspirin: Discontinue aspirin 7 days prior to procedure to reduce bleeding  risk. It should be resumed postoperatively.     Additional Medication Instructions  Take all scheduled medications on the day of surgery EXCEPT for modifications listed below:   - Herbal medications and vitamins: DO NOT TAKE 14 days prior to surgery.   - ACE/ARB/ARNI (lisinopril, enalapril, losartan, valsartan, olmesartan, sacubritril/valsartan) : Continue without modification (e.g., MAC anesthesia, neurosurgery, spine surgery, heart failure, or labile hypertension with risk of hypertension).   - Statins (atorvastatin, simvastatin, pravastatin) : Continue taking on the day of surgery.    - Opioids: Continue without modification.   956}    Recommendation  Approval given to proceed with proposed procedure, without further diagnostic evaluation.    Subjective   Phani is a 74 year old, presenting for the following:  Pre-Op Exam (Intracept at [facility] pain clinic 04/23/25)          4/15/2025     2:19 PM   Additional Questions   Roomed by sac   Accompanied by self         4/15/2025   Forms   Any forms needing to be completed Yes     HPI: Back pain has persisted despite routine physical therapy, oral medications and epidural steroid injections. He is trying to both minimize oral medications as well as avoid surgical intervention, hence the plan for this upcoming Intracept procedure.           4/15/2025   Pre-Op Questionnaire   Have you ever had a heart attack or stroke? No   Have you ever had surgery on your heart or blood vessels, such as a stent placement, a coronary artery bypass, or surgery on an artery in your head, neck, heart, or legs? No   Do you have chest pain with activity? No   Do you have a history of heart failure? No   Do you currently have a cold, bronchitis or symptoms of other infection? No   Do you have a cough, shortness of breath, or wheezing? No   Do you or anyone in your family have previous history of blood clots? No   Do you or does anyone in your family have a serious  bleeding problem such as prolonged bleeding following surgeries or cuts? No   Have you ever had problems with anemia or been told to take iron pills? No   Have you had any abnormal blood loss such as black, tarry or bloody stools? No   Have you ever had a blood transfusion? No   Are you willing to have a blood transfusion if it is medically needed before, during, or after your surgery? Yes   Have you or any of your relatives ever had problems with anesthesia? No   Do you have sleep apnea, excessive snoring or daytime drowsiness? No   Do you have any artifical heart valves or other implanted medical devices like a pacemaker, defibrillator, or continuous glucose monitor? No   Do you have artificial joints? No   Are you allergic to latex? No     Health Care Directive  Patient does not have a Health Care Directive: Discussed advance care planning with patient; however, patient declined at this time.    Preoperative Review of    reviewed - controlled substances reflected in medication list.      Status of Chronic Conditions:  See problem list for active medical problems.  Problems all longstanding and stable, except as noted/documented.  See ROS for pertinent symptoms related to these conditions.    Patient Active Problem List    Diagnosis Date Noted    Malignant neoplasm of base of tongue (H) 09/12/2024     Priority: Medium    Lymphedema 09/12/2024     Priority: Medium    Falls frequently 07/26/2024     Priority: Medium    Pre-syncope 07/26/2024     Priority: Medium    Excessive thirst 07/26/2024     Priority: Medium    Dehydration 05/28/2024     Priority: Medium    Primary squamous cell carcinoma of base of tongue (H) 03/15/2024     Priority: Medium    Tongue mass 02/14/2024     Priority: Medium    Claudication 06/13/2023     Priority: Medium    Essential hypertension 05/18/2023     Priority: Medium    Opioid type dependence, continuous (H) 11/30/2021     Priority: Medium     Created by Conversion      Formatting  of this note might be different from the original.  Formatting of this note might be different from the original.  Created by Conversion      Lumbar Disc Degeneration 11/30/2021     Priority: Medium     Created by Conversion      Formatting of this note might be different from the original.  Created by Conversion    Formatting of this note might be different from the original.  Formatting of this note might be different from the original.  Created by Conversion      PVD (posterior vitreous detachment), unspecified laterality 02/20/2020     Priority: Medium    Cortical age-related cataract of both eyes 02/20/2020     Priority: Medium      Past Medical History:   Diagnosis Date    Food intolerance in adult     Hypertension     Malignant neoplasm of base of tongue (H)      Past Surgical History:   Procedure Laterality Date    HC REMOVAL GALLBLADDER      Description: Cholecystectomy;  Recorded: 12/06/2011;     Current Outpatient Medications   Medication Sig Dispense Refill    aspirin (ASA) 325 MG tablet Take 1 tablet by mouth      cyclobenzaprine (FLEXERIL) 10 MG tablet Take 10 mg by mouth 3 times daily as needed      HYDROcodone-acetaminophen (NORCO)  MG per tablet       lisinopril (ZESTRIL) 10 MG tablet Take 1 tablet (10 mg) by mouth daily 90 tablet 0    Multiple Vitamins-Minerals (ONE-A-DAY 50 PLUS PO) Take 1 tablet by mouth daily      Probiotic Product (PROBIOTIC BLEND PO) Take 2 capsules by mouth daily Prebiotic and Probiotic blend      rosuvastatin (CRESTOR) 20 MG tablet Take 1 tablet (20 mg) by mouth at bedtime. 90 tablet 3    sodium fluoride dental gel (PREVIDENT) 1.1 % GEL topical gel USE AS DIRECTED OVERNIGHT. DISPENSE A SMALL AMOUNT INTO UPPER AND LOWER TRAYS. DO NOT EAT OR DRINK AFTER      tamsulosin (FLOMAX) 0.4 MG capsule Take 2 capsules (0.8 mg) by mouth daily. 180 capsule 1     Allergies   Allergen Reactions    Other Food Allergy Unknown     Many food intolerances      Social History     Tobacco  "Use    Smoking status: Former     Current packs/day: 0.00     Average packs/day: 1.3 packs/day for 27.0 years (36.0 ttl pk-yrs)     Types: Cigarettes     Start date: 1966     Quit date: 1984     Years since quittin.3     Passive exposure: Never    Smokeless tobacco: Never   Substance Use Topics    Alcohol use: Not Currently     History   Drug Use Unknown       Objective    BP (!) 151/83 (BP Location: Left arm, Patient Position: Sitting, Cuff Size: Adult Large)   Pulse 72   Temp 98.4  F (36.9  C) (Oral)   Resp 16   Ht 1.778 m (5' 10\")   Wt 81.8 kg (180 lb 6 oz)   SpO2 98%   BMI 25.88 kg/m     Estimated body mass index is 25.88 kg/m  as calculated from the following:    Height as of this encounter: 1.778 m (5' 10\").    Weight as of this encounter: 81.8 kg (180 lb 6 oz).    Physical Exam  GENERAL: alert and no distress  EYES: Eyes grossly normal to inspection, PERRL and conjunctivae and sclerae normal  HENT: ear canals and TM's normal, nose and mouth without ulcers or lesions  NECK: no adenopathy, no asymmetry, masses, or scars  RESP: lungs clear to auscultation - no rales, rhonchi or wheezes  CV: regular rate and rhythm, normal S1 S2, no S3 or S4, no murmur, click or rub, no peripheral edema  ABDOMEN: soft, nontender, no hepatosplenomegaly, no masses and bowel sounds normal  MS: no gross musculoskeletal defects noted, no edema  SKIN: no suspicious lesions or rashes  NEURO: Normal strength and tone, mentation intact and speech normal  PSYCH: mentation appears normal, affect normal/bright    Recent Labs   Lab Test 24  1525 24  1549 24  2043   HGB  --  11.6* 13.3   PLT  --  143* 199   NA  --  140 137   POTASSIUM  --  4.2 3.0*   CR 1.2 0.86 1.45*   A1C  --  4.8  --       Diagnostics  Labs pending at this time.  Results will be reviewed when available.   No EKG required, no history of coronary heart disease, significant arrhythmia, peripheral arterial disease or other structural heart " disease.    Revised Cardiac Risk Index (RCRI)  The patient has the following serious cardiovascular risks for perioperative complications:   - No serious cardiac risks = 0 points     RCRI Interpretation: 0 points: Class I (very low risk - 0.4% complication rate)         Signed Electronically by: HIREN Moody CNP  A copy of this evaluation report is provided to the requesting physician.       Answers submitted by the patient for this visit:  Patient Health Questionnaire (Submitted on 4/15/2025)  If you checked off any problems, how difficult have these problems made it for you to do your work, take care of things at home, or get along with other people?: Somewhat difficult  PHQ9 TOTAL SCORE: 1  Patient Health Questionnaire (G7) (Submitted on 4/15/2025)  NARCISO 7 TOTAL SCORE: 0

## 2025-04-16 ENCOUNTER — MYC MEDICAL ADVICE (OUTPATIENT)
Dept: FAMILY MEDICINE | Facility: CLINIC | Age: 75
End: 2025-04-16
Payer: MEDICARE

## 2025-04-16 LAB
ANION GAP SERPL CALCULATED.3IONS-SCNC: 6 MMOL/L (ref 7–15)
BUN SERPL-MCNC: 21.2 MG/DL (ref 8–23)
CALCIUM SERPL-MCNC: 9.7 MG/DL (ref 8.8–10.4)
CHLORIDE SERPL-SCNC: 104 MMOL/L (ref 98–107)
CHOLEST SERPL-MCNC: 193 MG/DL
CREAT SERPL-MCNC: 1.1 MG/DL (ref 0.67–1.17)
EGFRCR SERPLBLD CKD-EPI 2021: 70 ML/MIN/1.73M2
FASTING STATUS PATIENT QL REPORTED: NO
FASTING STATUS PATIENT QL REPORTED: NO
GLUCOSE SERPL-MCNC: 108 MG/DL (ref 70–99)
HCO3 SERPL-SCNC: 28 MMOL/L (ref 22–29)
HDLC SERPL-MCNC: 58 MG/DL
LDLC SERPL CALC-MCNC: 117 MG/DL
MAGNESIUM SERPL-MCNC: 2.2 MG/DL (ref 1.7–2.3)
NONHDLC SERPL-MCNC: 135 MG/DL
POTASSIUM SERPL-SCNC: 4.3 MMOL/L (ref 3.4–5.3)
PSA SERPL DL<=0.01 NG/ML-MCNC: 0.25 NG/ML (ref 0–6.5)
SODIUM SERPL-SCNC: 138 MMOL/L (ref 135–145)
T4 FREE SERPL-MCNC: 0.36 NG/DL (ref 0.9–1.7)
TRIGL SERPL-MCNC: 92 MG/DL
TSH SERPL DL<=0.005 MIU/L-ACNC: 87.7 UIU/ML (ref 0.3–4.2)

## 2025-04-17 ENCOUNTER — OFFICE VISIT (OUTPATIENT)
Dept: FAMILY MEDICINE | Facility: CLINIC | Age: 75
End: 2025-04-17
Payer: MEDICARE

## 2025-04-17 VITALS
WEIGHT: 178.25 LBS | OXYGEN SATURATION: 96 % | TEMPERATURE: 98.2 F | HEART RATE: 71 BPM | BODY MASS INDEX: 25.52 KG/M2 | HEIGHT: 70 IN | RESPIRATION RATE: 16 BRPM

## 2025-04-17 DIAGNOSIS — E03.9 HYPOTHYROIDISM, UNSPECIFIED TYPE: Primary | ICD-10-CM

## 2025-04-17 RX ORDER — LEVOTHYROXINE SODIUM 25 UG/1
25 TABLET ORAL
Qty: 90 TABLET | Refills: 0 | Status: SHIPPED | OUTPATIENT
Start: 2025-04-17

## 2025-04-17 NOTE — ASSESSMENT & PLAN NOTE
Patient presents today in follow up for recent abnormal thyroid tests. He had labs drawn showing TSH of 87.7 and FT4 of 0.36. Additional labs ordered today including TPO antibody and FT4. Based on weight, his daily levothyroxine requirement would be 40mcg daily so given age and the fact he generally feels well, will start cautiously at 25mcg and plan to titrate based on labs in 6-8 weeks. Expected therapeutic effects and potential side effects discussed. Patient expressed understanding of and agreement with this plan. All questions were answered.    Orders:    Thyroid peroxidase antibody; Future    T3, Free; Future    levothyroxine (SYNTHROID/LEVOTHROID) 25 MCG tablet; Take 1 tablet (25 mcg) by mouth every morning (before breakfast).

## 2025-04-17 NOTE — PROGRESS NOTES
"  Assessment & Plan   Assessment & Plan  Hypothyroidism, unspecified type  Patient presents today in follow up for recent abnormal thyroid tests. He had labs drawn showing TSH of 87.7 and FT4 of 0.36. Additional labs ordered today including TPO antibody and FT4. Based on weight, his daily levothyroxine requirement would be 40mcg daily so given age and the fact he generally feels well, will start cautiously at 25mcg and plan to titrate based on labs in 6-8 weeks. Expected therapeutic effects and potential side effects discussed. Patient expressed understanding of and agreement with this plan. All questions were answered.    Orders:    Thyroid peroxidase antibody; Future    T3, Free; Future    levothyroxine (SYNTHROID/LEVOTHROID) 25 MCG tablet; Take 1 tablet (25 mcg) by mouth every morning (before breakfast).    Subjective   Phani is a 74 year old, presenting for the following health issues:  Follow Up (Thyroid)        4/17/2025     2:41 PM   Additional Questions   Roomed by sac   Accompanied by self     History of Present Illness       Reason for visit:  Physician request.   He is taking medications regularly.          Objective    Pulse 71   Temp 98.2  F (36.8  C) (Oral)   Resp 16   Ht 1.778 m (5' 10\")   Wt 80.9 kg (178 lb 4 oz)   SpO2 96%   BMI 25.58 kg/m    Body mass index is 25.58 kg/m .    Physical Exam  Vitals and nursing note reviewed.   Constitutional:       General: He is not in acute distress.     Appearance: Normal appearance.   Cardiovascular:      Rate and Rhythm: Normal rate and regular rhythm.   Pulmonary:      Effort: Pulmonary effort is normal. No respiratory distress.   Neurological:      Mental Status: He is alert.   Psychiatric:         Mood and Affect: Mood normal.         Behavior: Behavior normal.         Thought Content: Thought content normal.          Recent Results (from the past week)   TSH with free T4 reflex   Result Value Ref Range    TSH 87.70 (H) 0.30 - 4.20 uIU/mL   Lipid panel " reflex to direct LDL Non-fasting   Result Value Ref Range    Cholesterol 193 <200 mg/dL    Triglycerides 92 <150 mg/dL    Direct Measure HDL 58 >=40 mg/dL    LDL Cholesterol Calculated 117 (H) <100 mg/dL    Non HDL Cholesterol 135 (H) <130 mg/dL    Patient Fasting > 8hrs? No    Basic metabolic panel  (Ca, Cl, CO2, Creat, Gluc, K, Na, BUN)   Result Value Ref Range    Sodium 138 135 - 145 mmol/L    Potassium 4.3 3.4 - 5.3 mmol/L    Chloride 104 98 - 107 mmol/L    Carbon Dioxide (CO2) 28 22 - 29 mmol/L    Anion Gap 6 (L) 7 - 15 mmol/L    Urea Nitrogen 21.2 8.0 - 23.0 mg/dL    Creatinine 1.10 0.67 - 1.17 mg/dL    GFR Estimate 70 >60 mL/min/1.73m2    Calcium 9.7 8.8 - 10.4 mg/dL    Glucose 108 (H) 70 - 99 mg/dL    Patient Fasting > 8hrs? No    Hemoglobin A1c   Result Value Ref Range    Estimated Average Glucose 114 <117 mg/dL    Hemoglobin A1C 5.6 0.0 - 5.6 %   PSA, screen   Result Value Ref Range    Prostate Specific Antigen Screen 0.25 0.00 - 6.50 ng/mL   Magnesium   Result Value Ref Range    Magnesium 2.2 1.7 - 2.3 mg/dL   T4 free   Result Value Ref Range    Free T4 0.36 (L) 0.90 - 1.70 ng/dL             Signed Electronically by: HIREN Moody CNP

## 2025-04-18 LAB — T3FREE SERPL-MCNC: 2 PG/ML (ref 2–4.4)

## 2025-04-30 ENCOUNTER — MYC MEDICAL ADVICE (OUTPATIENT)
Dept: FAMILY MEDICINE | Facility: CLINIC | Age: 75
End: 2025-04-30
Payer: MEDICARE

## 2025-04-30 NOTE — TELEPHONE ENCOUNTER
Left message to call back for: Phani  Information to relay to patient: please transfer to nurse to triage symtpoms

## 2025-05-07 ENCOUNTER — TRANSFERRED RECORDS (OUTPATIENT)
Dept: HEALTH INFORMATION MANAGEMENT | Facility: CLINIC | Age: 75
End: 2025-05-07
Payer: MEDICARE

## 2025-05-27 ENCOUNTER — OFFICE VISIT (OUTPATIENT)
Dept: FAMILY MEDICINE | Facility: CLINIC | Age: 75
End: 2025-05-27
Payer: MEDICARE

## 2025-05-27 VITALS
BODY MASS INDEX: 25.83 KG/M2 | RESPIRATION RATE: 16 BRPM | HEART RATE: 69 BPM | SYSTOLIC BLOOD PRESSURE: 131 MMHG | DIASTOLIC BLOOD PRESSURE: 67 MMHG | HEIGHT: 70 IN | WEIGHT: 180.44 LBS | TEMPERATURE: 98.8 F | OXYGEN SATURATION: 98 %

## 2025-05-27 DIAGNOSIS — Z01.818 PREOP GENERAL PHYSICAL EXAM: Primary | ICD-10-CM

## 2025-05-27 DIAGNOSIS — E03.9 HYPOTHYROIDISM, UNSPECIFIED TYPE: ICD-10-CM

## 2025-05-27 DIAGNOSIS — M54.51 VERTEBROGENIC LOW BACK PAIN: ICD-10-CM

## 2025-05-27 DIAGNOSIS — F11.20 OPIOID TYPE DEPENDENCE, CONTINUOUS (H): ICD-10-CM

## 2025-05-27 DIAGNOSIS — I73.9 CLAUDICATION: ICD-10-CM

## 2025-05-27 DIAGNOSIS — I10 ESSENTIAL HYPERTENSION: ICD-10-CM

## 2025-05-27 LAB
T4 FREE SERPL-MCNC: 1.09 NG/DL (ref 0.9–1.7)
TSH SERPL DL<=0.005 MIU/L-ACNC: 20.6 UIU/ML (ref 0.3–4.2)

## 2025-05-27 PROCEDURE — 3078F DIAST BP <80 MM HG: CPT

## 2025-05-27 PROCEDURE — 36415 COLL VENOUS BLD VENIPUNCTURE: CPT

## 2025-05-27 PROCEDURE — 84439 ASSAY OF FREE THYROXINE: CPT

## 2025-05-27 PROCEDURE — G2211 COMPLEX E/M VISIT ADD ON: HCPCS

## 2025-05-27 PROCEDURE — 3075F SYST BP GE 130 - 139MM HG: CPT

## 2025-05-27 PROCEDURE — 84443 ASSAY THYROID STIM HORMONE: CPT

## 2025-05-27 PROCEDURE — 99214 OFFICE O/P EST MOD 30 MIN: CPT

## 2025-05-27 NOTE — ASSESSMENT & PLAN NOTE
This is a relatively new diagnosis.  Patient had labs done approximately 1.5 months ago which showed an elevation in his TSH to 77 and T4 of 0.36.  He was started on levothyroxine, 25 mcg, and approximately 2 weeks later increased his dose to 50 mcg daily.  His primary complaints include frequent urination as well as easy fatigue.  He has not noticed any significant improvement in those symptoms despite the addition of levothyroxine.  Will update TSH with T4 reflex today.  He is aware it is recommended to be on a dose of this medication for 6 weeks prior to checking labs, however if there has not been at least a 66% decrease in his TSH I will increase his levothyroxine to 88 mcg.  Will need updated labs in 6 weeks at that point.  Patient expresses fairly significant concern related to our plan.  We discussed that is quite possible he will require a higher dose of this medication, however the risks associated with over supplementing his thyroid at this point to given his age mean we should not be rapidly increasing his dose based on symptoms alone. Based on his weight, he should have started with 40mcg daily and we reviewed this today as well. I have offered an endocrinology referral however he declines today. We also discussed that although urinary symptoms are seen in thyroid disease, it is possible this is not the sole contributor and would still recommend evaluation by urology if symptoms are persisting.   Orders:    TSH with free T4 reflex; Future

## 2025-05-27 NOTE — ASSESSMENT & PLAN NOTE
Current therapies include rosuvastatin which he tolerates without side effects. Continue as prescribed.

## 2025-05-27 NOTE — PROGRESS NOTES
Preoperative Evaluation  [facility]  [address]  [facility] [address]  Phone: [phone]  Fax: [phone]  Primary Provider: HIREN Moody CNP  Pre-op Performing Provider: HIREN Moody CNP  May 27, 2025             5/27/2025   Surgical Information   What procedure is being done? Intracept back   Facility or Hospital where procedure/surgery will be performed: [facility]   Who is doing the procedure / surgery? Staff physician   Date of surgery / procedure: 6/6/2025   Time of surgery / procedure: 12:00   Where do you plan to recover after surgery? at home with family     Fax number for surgical facility: [phone]    Assessment & Plan     The proposed surgical procedure is considered LOW risk.    Assessment & Plan  Preop general physical exam  Patient is aware that specific preoperative instruction and all postoperative guidance will come from his procedural team. We discussed NPO recommendations, antiseptic bathing and medication adjustments. All questions were answered.       Vertebrogenic low back pain  Indication for planned procedure. Specialty notes reviewed. Back pain has persisted despite routine physical therapy, oral medications and epidural steroid injections. He is trying to both minimize oral medications as well as avoid surgical intervention, hence the plan for this upcoming Intracept procedure.        Claudication  Current therapies include rosuvastatin which he tolerates without side effects. Continue as prescribed.       Essential hypertension  Well-managed as evidenced by BP of 131/67 today in clinic.  Current therapies include lisinopril 10 mg daily.  He is up-to-date on monitoring labs.  Continue as prescribed.       Opioid type dependence, continuous (H)  Managed by [facility].  Notes reviewed.       Hypothyroidism, unspecified type  This is a relatively new diagnosis.  Patient had labs done approximately 1.5  months ago which showed an elevation in his TSH to 77 and T4 of 0.36.  He was started on levothyroxine, 25 mcg, and approximately 2 weeks later increased his dose to 50 mcg daily.  His primary complaints include frequent urination as well as easy fatigue.  He has not noticed any significant improvement in those symptoms despite the addition of levothyroxine.  Will update TSH with T4 reflex today.  He is aware it is recommended to be on a dose of this medication for 6 weeks prior to checking labs, however if there has not been at least a 66% decrease in his TSH I will increase his levothyroxine to 88 mcg.  Will need updated labs in 6 weeks at that point.  Patient expresses fairly significant concern related to our plan.  We discussed that is quite possible he will require a higher dose of this medication, however the risks associated with over supplementing his thyroid at this point to given his age mean we should not be rapidly increasing his dose based on symptoms alone. Based on his weight, he should have started with 40mcg daily and we reviewed this today as well. I have offered an endocrinology referral however he declines today. We also discussed that although urinary symptoms are seen in thyroid disease, it is possible this is not the sole contributor and would still recommend evaluation by urology if symptoms are persisting.   Orders:    TSH with free T4 reflex; Future     - No identified additional risk factors other than previously addressed    Preoperative Medication Instructions  Antiplatelet or Anticoagulation Medication Instructions   - aspirin: Discontinue aspirin 7 days prior to procedure to reduce bleeding risk. It should be resumed postoperatively.     Additional Medication Instructions  Take all scheduled medications on the day of surgery EXCEPT for modifications listed below:   - Herbal medications and vitamins: DO NOT TAKE 14 days prior to surgery.   - ACE/ARB/ARNI (lisinopril, enalapril,  losartan, valsartan, olmesartan, sacubritril/valsartan) : Continue without modification (e.g., MAC anesthesia, neurosurgery, spine surgery, heart failure, or labile hypertension with risk of hypertension).   - Statins (atorvastatin, simvastatin, pravastatin) : Continue taking on the day of surgery.    - Opioids: Continue without modification.     Recommendation  Approval given to proceed with proposed procedure, without further diagnostic evaluation.    The longitudinal plan of care for the diagnosis(es)/condition(s) as documented were addressed during this visit. Due to the added complexity in care, I will continue to support Phani in the subsequent management and with ongoing continuity of care.    Subjective   Phani is a 75 year old, presenting for the following:  Pre-Op Exam (Back surgery)          5/27/2025    10:57 AM   Additional Questions   Roomed by sac   Accompanied by self         5/27/2025   Forms   Any forms needing to be completed Yes     HPI:  Back pain has persisted despite routine physical therapy, oral medications and epidural steroid injections. He is trying to both minimize oral medications as well as avoid surgical intervention, hence the plan for this upcoming Intracept procedure.           5/27/2025   Pre-Op Questionnaire   Have you ever had a heart attack or stroke? No   Have you ever had surgery on your heart or blood vessels, such as a stent placement, a coronary artery bypass, or surgery on an artery in your head, neck, heart, or legs? No   Do you have chest pain with activity? No   Do you have a history of heart failure? No   Do you currently have a cold, bronchitis or symptoms of other infection? No   Do you have a cough, shortness of breath, or wheezing? No   Do you or anyone in your family have previous history of blood clots? No   Do you or does anyone in your family have a serious bleeding problem such as prolonged bleeding following surgeries or cuts? No   Have you ever had problems  with anemia or been told to take iron pills? No   Have you had any abnormal blood loss such as black, tarry or bloody stools? No   Have you ever had a blood transfusion? No   Are you willing to have a blood transfusion if it is medically needed before, during, or after your surgery? Yes   Have you or any of your relatives ever had problems with anesthesia? No   Do you have sleep apnea, excessive snoring or daytime drowsiness? No   Do you have any artifical heart valves or other implanted medical devices like a pacemaker, defibrillator, or continuous glucose monitor? No   Do you have artificial joints? No   Are you allergic to latex? No     Advance Care Planning    Discussed advance care planning with patient; however, patient declined at this time.    Preoperative Review of    reviewed - controlled substances reflected in medication list.      Status of Chronic Conditions:  See problem list for active medical problems.  Problems all longstanding and stable, except as noted/documented.  See ROS for pertinent symptoms related to these conditions.    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  Formatting of this note might be different from the original.  Created by Conversion      Lumbar  Disc Degeneration 11/30/2021     Priority: Medium     Created by Conversion      Formatting of this note might be different from the original.  Created by Conversion    Formatting of this note might be different from the original.  Formatting of this note might be different from the original.  Created by Conversion      PVD (posterior vitreous detachment), unspecified laterality 02/20/2020     Priority: Medium    Cortical age-related cataract of both eyes 02/20/2020     Priority: Medium    Hypothyroidism, unspecified type 12/03/2015     Priority: Medium      Past Medical History:   Diagnosis Date    Food intolerance in adult     Hearing problem 2007    Tinnitus and some hearing loss    Hepatitis 2005?    Testing no trace Hep C now    Hypertension     Malignant neoplasm of base of tongue (H)      Past Surgical History:   Procedure Laterality Date    ABDOMEN SURGERY  1985    COLONOSCOPY      EYE SURGERY      [facility] REMOVAL GALLBLADDER      Description: Cholecystectomy;  Recorded: 12/06/2011;    DC STOMACH SURGERY PROCEDURE UNLISTED  1995?    Gall bladder     Current Outpatient Medications   Medication Sig Dispense Refill    aspirin (ASA) 325 MG tablet Take 1 tablet by mouth      cyclobenzaprine (FLEXERIL) 10 MG tablet Take 10 mg by mouth 3 times daily as needed      HYDROcodone-acetaminophen (NORCO)  MG per tablet       levothyroxine (SYNTHROID/LEVOTHROID) 25 MCG tablet Take 1 tablet (25 mcg) by mouth every morning (before breakfast). (Patient taking differently: Take 50 mcg by mouth every morning (before breakfast).) 90 tablet 0    lisinopril (ZESTRIL) 10 MG tablet Take 1 tablet (10 mg) by mouth daily 90 tablet 0    Multiple Vitamins-Minerals (ONE-A-DAY 50 PLUS PO) Take 1 tablet by mouth daily      Probiotic Product (PROBIOTIC BLEND PO) Take 2 capsules by mouth daily Prebiotic and Probiotic blend      rosuvastatin (CRESTOR) 20 MG tablet Take 1 tablet (20 mg) by mouth at bedtime. 90 tablet 3    sodium fluoride  "dental gel (PREVIDENT) 1.1 % GEL topical gel USE AS DIRECTED OVERNIGHT. DISPENSE A SMALL AMOUNT INTO UPPER AND LOWER TRAYS. DO NOT EAT OR DRINK AFTER      tamsulosin (FLOMAX) 0.4 MG capsule Take 2 capsules (0.8 mg) by mouth daily. 180 capsule 1       Allergies   Allergen Reactions    Other Food Allergy Unknown     Many food intolerances        Social History     Tobacco Use    Smoking status: Former     Current packs/day: 0.00     Average packs/day: 1.3 packs/day for 27.0 years (36.0 ttl pk-yrs)     Types: Cigarettes     Start date: 1966     Quit date: 1984     Years since quittin.4     Passive exposure: Never    Smokeless tobacco: Never   Substance Use Topics    Alcohol use: Not Currently     History   Drug Use Unknown             Review of Systems  Constitutional, HEENT, cardiovascular, pulmonary, gi and gu systems are negative, except as otherwise noted.    Objective    There were no vitals taken for this visit.   Estimated body mass index is 25.58 kg/m  as calculated from the following:    Height as of 25: 1.778 m (5' 10\").    Weight as of 25: 80.9 kg (178 lb 4 oz).  Physical Exam  GENERAL: alert and no distress  EYES: Eyes grossly normal to inspection, PERRL and conjunctivae and sclerae normal  HENT: ear canals and TM's normal, nose and mouth without ulcers or lesions  NECK: no adenopathy, no asymmetry, masses, or scars  RESP: lungs clear to auscultation - no rales, rhonchi or wheezes  CV: regular rate and rhythm, normal S1 S2, no S3 or S4, no murmur, click or rub, no peripheral edema  ABDOMEN: soft, nontender, no hepatosplenomegaly, no masses and bowel sounds normal  MS: no gross musculoskeletal defects noted, no edema  SKIN: no suspicious lesions or rashes  NEURO: Normal strength and tone, mentation intact and speech normal  PSYCH: mentation appears normal, affect normal/bright    Recent Labs   Lab Test 04/15/25  1526 24  1525 24  1549   HGB  --   --  11.6*   PLT  --   --  " 143*     --  140   POTASSIUM 4.3  --  4.2   CR 1.10 1.2 0.86   A1C 5.6  --  4.8        Diagnostics  No labs were ordered during this visit.   No EKG required, no history of coronary heart disease, significant arrhythmia, peripheral arterial disease or other structural heart disease.    Revised Cardiac Risk Index (RCRI)  The patient has the following serious cardiovascular risks for perioperative complications:   - No serious cardiac risks = 0 points     RCRI Interpretation: 0 points: Class I (very low risk - 0.4% complication rate)         Signed Electronically by: HIREN Moody CNP  A copy of this evaluation report is provided to the requesting physician.

## 2025-05-27 NOTE — ASSESSMENT & PLAN NOTE
Well-managed as evidenced by BP of 131/67 today in clinic.  Current therapies include lisinopril 10 mg daily.  He is up-to-date on monitoring labs.  Continue as prescribed.

## 2025-05-27 NOTE — PATIENT INSTRUCTIONS
How to Take Your Medication Before Surgery  Preoperative Medication Instructions   Antiplatelet or Anticoagulation Medication Instructions   - aspirin: Discontinue aspirin 7 days prior to procedure to reduce bleeding risk. It should be resumed postoperatively.     Additional Medication Instructions  Take all scheduled medications on the day of surgery EXCEPT for modifications listed below:   - Herbal medications and vitamins: DO NOT TAKE 14 days prior to surgery.   - ACE/ARB/ARNI (lisinopril, enalapril, losartan, valsartan, olmesartan, sacubritril/valsartan) : Continue without modification (e.g., MAC anesthesia, neurosurgery, spine surgery, heart failure, or labile hypertension with risk of hypertension).   - Statins (atorvastatin, simvastatin, pravastatin) : Continue taking on the day of surgery.    - Opioids: Continue without modification.   956}       Patient Education   Preparing for Your Surgery  For Adults  Getting started  In most cases, a nurse will call to review your health history and instructions. They will give you an arrival time based on your scheduled surgery time. Please be ready to share:  Your doctor's clinic name and phone number  Your medical, surgical, and anesthesia history  A list of allergies and sensitivities  A list of medicines, including herbal treatments and over-the-counter drugs  Whether the patient has a legal guardian (ask how to send us the papers in advance)  Note: You may not receive a call if you were seen at our PAC (Preoperative Assessment Center).  Please tell us if you're pregnant--or if there's any chance you might be pregnant. Some surgeries may injure a fetus (unborn baby), so they require a pregnancy test. Surgeries that are safe for a fetus don't always need a test, and you can choose whether to have one.   Preparing for surgery  Within 10 to 30 days of surgery: Have a pre-op exam (sometimes called an H&P, or History and Physical). This can be done at a clinic or  pre-operative center.  If you're having a , you may not need this exam. Talk to your care team.  At your pre-op exam, talk to your care team about all medicines you take. (This includes CBD oil and any drugs, such as THC, marijuana, and other forms of cannabis.) If you need to stop any medicine before surgery, ask when to start taking it again.  This is for your safety. Many medicines and drugs can make you bleed too much during surgery. Some change how well surgery (anesthesia) drugs work.  Call your insurance company to let them know you're having surgery. (If you don't have insurance, call [phone].)  Call your clinic if there's any change in your health. This includes a scrape or scratch near the surgery site, or any signs of a cold (sore throat, runny nose, cough, rash, fever).  Eating and drinking guidelines  For your safety: Unless your surgeon tells you otherwise, follow the guidelines below.  Eat and drink as normal until 8 hours before you arrive for surgery. After that, no food or milk. You can spit out gum when you arrive.  Drink clear liquids until 2 hours before you arrive. These are liquids you can see through, like water, Gatorade, and Propel Water. They also include plain black coffee and tea (no cream or milk).  No alcohol for 24 hours before you arrive. The night before surgery, stop any drinks that contain THC.  If your care team tells you to take medicine on the morning of surgery, it's okay to take it with a sip of water. No other medicines or drugs are allowed (including CBD oil)--follow your care team's instructions.  If you have questions the day of surgery, call your hospital or surgery center.   Preventing infection  Shower or bathe the night before and the morning of surgery. Follow the instructions your clinic gave you. (If no instructions, use regular soap.)  Don't shave or clip hair near your surgery site. We'll remove the hair if needed.  Don't smoke or vape the morning  of surgery. No chewing tobacco for 6 hours before you arrive. A nicotine patch is okay. You may spit out nicotine gum when you arrive.  For some surgeries, the surgeon will tell you to fully quit smoking and nicotine.  We will make every effort to keep you safe from infection. We will:  Clean our hands often with soap and water (or an alcohol-based hand rub).  Clean the skin at your surgery site with a special soap that kills germs.  Give you a special gown to keep you warm. (Cold raises the risk of infection.)  Wear hair covers, masks, gowns, and gloves during surgery.  Give antibiotic medicine, if prescribed. Not all surgeries need this medicine.  What to bring on the day of surgery  Photo ID and insurance card  Copy of your health care directive, if you have one  Glasses and hearing aids (bring cases)  You can't wear contacts during surgery  Inhaler and eye drops, if you use them (tell us about these when you arrive)  CPAP machine or breathing device, if you use them  A few personal items, if spending the night  If you have . . .  A pacemaker, ICD (cardiac defibrillator), or other implant: Bring the ID card.  An implanted stimulator: Bring the remote control.  A legal guardian: Bring a copy of the certified (court-stamped) guardianship papers.  Please remove any jewelry, including body piercings. Leave jewelry and other valuables at home.  If you're going home the day of surgery  You must have a responsible adult drive you home. They should stay with you overnight as well.  If you don't have someone to stay with you, and you aren't safe to go home alone, we may keep you overnight. Insurance often won't pay for this.  After surgery  If it's hard to control your pain or you need more pain medicine, please call your surgeon's office.  Questions?   If you have any questions for your care team, list them here:    ____________________________________________________________________________________________________________________________________________________________________________________________________________________________________________________________  For informational purposes only. Not to replace the advice of your health care provider. Copyright   2003, 2019 [facility]. All rights reserved. Clinically reviewed by David Osorio MD. [employer] [id number] - REV 08/24.

## 2025-05-27 NOTE — ASSESSMENT & PLAN NOTE
Indication for planned procedure. Specialty notes reviewed. Back pain has persisted despite routine physical therapy, oral medications and epidural steroid injections. He is trying to both minimize oral medications as well as avoid surgical intervention, hence the plan for this upcoming Intracept procedure.

## 2025-05-29 ENCOUNTER — RESULTS FOLLOW-UP (OUTPATIENT)
Dept: FAMILY MEDICINE | Facility: CLINIC | Age: 75
End: 2025-05-29
Payer: MEDICARE

## 2025-05-29 DIAGNOSIS — E03.9 HYPOTHYROIDISM, UNSPECIFIED TYPE: Primary | ICD-10-CM

## 2025-05-29 RX ORDER — LEVOTHYROXINE SODIUM 88 UG/1
88 TABLET ORAL
Qty: 90 TABLET | Refills: 0 | Status: SHIPPED | OUTPATIENT
Start: 2025-05-29

## 2025-06-04 ENCOUNTER — TRANSFERRED RECORDS (OUTPATIENT)
Dept: HEALTH INFORMATION MANAGEMENT | Facility: CLINIC | Age: 75
End: 2025-06-04
Payer: MEDICARE

## 2025-06-14 ENCOUNTER — HEALTH MAINTENANCE LETTER (OUTPATIENT)
Age: 75
End: 2025-06-14

## 2025-07-01 ENCOUNTER — TRANSFERRED RECORDS (OUTPATIENT)
Dept: HEALTH INFORMATION MANAGEMENT | Facility: CLINIC | Age: 75
End: 2025-07-01
Payer: MEDICARE

## 2025-07-09 ENCOUNTER — MYC MEDICAL ADVICE (OUTPATIENT)
Dept: FAMILY MEDICINE | Facility: CLINIC | Age: 75
End: 2025-07-09
Payer: MEDICARE

## 2025-07-10 ENCOUNTER — LAB (OUTPATIENT)
Dept: LAB | Facility: CLINIC | Age: 75
End: 2025-07-10
Payer: MEDICARE

## 2025-07-10 DIAGNOSIS — E03.9 HYPOTHYROIDISM, UNSPECIFIED TYPE: ICD-10-CM

## 2025-07-14 ENCOUNTER — OFFICE VISIT (OUTPATIENT)
Dept: FAMILY MEDICINE | Facility: CLINIC | Age: 75
End: 2025-07-14
Payer: MEDICARE

## 2025-07-14 VITALS
RESPIRATION RATE: 16 BRPM | SYSTOLIC BLOOD PRESSURE: 103 MMHG | TEMPERATURE: 98.4 F | OXYGEN SATURATION: 97 % | DIASTOLIC BLOOD PRESSURE: 65 MMHG | WEIGHT: 178.06 LBS | BODY MASS INDEX: 25.49 KG/M2 | HEIGHT: 70 IN | HEART RATE: 81 BPM

## 2025-07-14 DIAGNOSIS — F11.20 OPIOID TYPE DEPENDENCE, CONTINUOUS (H): ICD-10-CM

## 2025-07-14 DIAGNOSIS — Z01.818 PREOP GENERAL PHYSICAL EXAM: Primary | ICD-10-CM

## 2025-07-14 DIAGNOSIS — M54.51 VERTEBROGENIC LOW BACK PAIN: ICD-10-CM

## 2025-07-14 DIAGNOSIS — I73.9 CLAUDICATION: ICD-10-CM

## 2025-07-14 DIAGNOSIS — R35.1 FREQUENT NOCTUNAL URINATION: ICD-10-CM

## 2025-07-14 DIAGNOSIS — I10 ESSENTIAL HYPERTENSION: ICD-10-CM

## 2025-07-14 DIAGNOSIS — E03.9 HYPOTHYROIDISM, UNSPECIFIED TYPE: ICD-10-CM

## 2025-07-14 PROCEDURE — 99214 OFFICE O/P EST MOD 30 MIN: CPT

## 2025-07-14 PROCEDURE — G2211 COMPLEX E/M VISIT ADD ON: HCPCS

## 2025-07-14 PROCEDURE — 3074F SYST BP LT 130 MM HG: CPT

## 2025-07-14 PROCEDURE — 3078F DIAST BP <80 MM HG: CPT

## 2025-07-14 NOTE — ASSESSMENT & PLAN NOTE
Indication for planned procedure.  Patient follows with [facility] pain.  Notes reviewed. Back pain has persisted despite routine physical therapy, oral medications and epidural steroid injections. He is trying to both minimize oral medications as well as avoid surgical intervention, hence the plan for this upcoming Intracept procedure.

## 2025-07-14 NOTE — ASSESSMENT & PLAN NOTE
Recent labs show normalization of T4 and therapeutic TSH.  Will continue current therapies of levothyroxine 88 mcg daily.  Continue as prescribed prior to his procedure.  Orders:    TSH with free T4 reflex; Future

## 2025-07-14 NOTE — PATIENT INSTRUCTIONS
How to Take Your Medication Before Surgery  Preoperative Medication Instructions   Take all scheduled medications on the day of surgery EXCEPT for modifications listed below:   - Herbal medications and vitamins: DO NOT TAKE 14 days prior to surgery.   - ACE/ARB/ARNI (lisinopril, enalapril, losartan, valsartan, olmesartan, sacubritril/valsartan) : Continue without modification (e.g., MAC anesthesia, neurosurgery, spine surgery, heart failure, or labile hypertension with risk of hypertension).   - Statins (atorvastatin, simvastatin, pravastatin) : Continue taking on the day of surgery.    - Opioids: Continue without modification.        Patient Education   Preparing for Your Surgery  For Adults  Getting started  In most cases, a nurse will call to review your health history and instructions. They will give you an arrival time based on your scheduled surgery time. Please be ready to share:  Your doctor's clinic name and phone number  Your medical, surgical, and anesthesia history  A list of allergies and sensitivities  A list of medicines, including herbal treatments and over-the-counter drugs  Whether the patient has a legal guardian (ask how to send us the papers in advance)  Note: You may not receive a call if you were seen at our PAC (Preoperative Assessment Center).  Please tell us if you're pregnant--or if there's any chance you might be pregnant. Some surgeries may injure a fetus (unborn baby), so they require a pregnancy test. Surgeries that are safe for a fetus don't always need a test, and you can choose whether to have one.   Preparing for surgery  Within 10 to 30 days of surgery: Have a pre-op exam (sometimes called an H&P, or History and Physical). This can be done at a clinic or pre-operative center.  If you're having a , you may not need this exam. Talk to your care team.  At your pre-op exam, talk to your care team about all medicines you take. (This includes CBD oil and any drugs, such as THC,  marijuana, and other forms of cannabis.) If you need to stop any medicine before surgery, ask when to start taking it again.  This is for your safety. Many medicines and drugs can make you bleed too much during surgery. Some change how well surgery (anesthesia) drugs work.  Call your insurance company to let them know you're having surgery. (If you don't have insurance, call [phone].)  Call your clinic if there's any change in your health. This includes a scrape or scratch near the surgery site, or any signs of a cold (sore throat, runny nose, cough, rash, fever).  Eating and drinking guidelines  For your safety: Unless your surgeon tells you otherwise, follow the guidelines below.  Eat and drink as normal until 8 hours before you arrive for surgery. After that, no food or milk. You can spit out gum when you arrive.  Drink clear liquids until 2 hours before you arrive. These are liquids you can see through, like water, Gatorade, and Propel Water. They also include plain black coffee and tea (no cream or milk).  No alcohol for 24 hours before you arrive. The night before surgery, stop any drinks that contain THC.  If your care team tells you to take medicine on the morning of surgery, it's okay to take it with a sip of water. No other medicines or drugs are allowed (including CBD oil)--follow your care team's instructions.  If you have questions the day of surgery, call your hospital or surgery center.   Preventing infection  Shower or bathe the night before and the morning of surgery. Follow the instructions your clinic gave you. (If no instructions, use regular soap.)  Don't shave or clip hair near your surgery site. We'll remove the hair if needed.  Don't smoke or vape the morning of surgery. No chewing tobacco for 6 hours before you arrive. A nicotine patch is okay. You may spit out nicotine gum when you arrive.  For some surgeries, the surgeon will tell you to fully quit smoking and nicotine.  We will make  every effort to keep you safe from infection. We will:  Clean our hands often with soap and water (or an alcohol-based hand rub).  Clean the skin at your surgery site with a special soap that kills germs.  Give you a special gown to keep you warm. (Cold raises the risk of infection.)  Wear hair covers, masks, gowns, and gloves during surgery.  Give antibiotic medicine, if prescribed. Not all surgeries need this medicine.  What to bring on the day of surgery  Photo ID and insurance card  Copy of your health care directive, if you have one  Glasses and hearing aids (bring cases)  You can't wear contacts during surgery  Inhaler and eye drops, if you use them (tell us about these when you arrive)  CPAP machine or breathing device, if you use them  A few personal items, if spending the night  If you have . . .  A pacemaker, ICD (cardiac defibrillator), or other implant: Bring the ID card.  An implanted stimulator: Bring the remote control.  A legal guardian: Bring a copy of the certified (court-stamped) guardianship papers.  Please remove any jewelry, including body piercings. Leave jewelry and other valuables at home.  If you're going home the day of surgery  You must have a support person drive you home. They should stay with you overnight, and they may need to help with your self-care.  If you don't have a support person, please tells us as soon as possible. We can help.  After surgery  If it's hard to control your pain or you need more pain medicine, please call your surgeon's office.  Questions?   If you have any questions for your care team, list them here:   ____________________________________________________________________________________________________________________________________________________________________________________________________________________________________________________________  For informational purposes only. Not to replace the advice of your health care provider. Copyright   2003,  2019 [facility]. All rights reserved. Clinically reviewed by David Osorio MD. [employer] [id number] - REV 02/25.

## 2025-07-14 NOTE — ASSESSMENT & PLAN NOTE
Well-managed as evidenced by BP of 103/65 today in clinic.  Current therapies include lisinopril 10 mg daily.  He is up-to-date on monitoring labs.  Continue as prescribed.

## 2025-07-14 NOTE — PROGRESS NOTES
Preoperative Evaluation  [facility]  [address]  [facility] [address]  Phone: [phone]  Fax: [phone]  Primary Provider: HIREN Moody CNP  Pre-op Performing Provider: HIREN Moody CNP  Jul 14, 2025 7/14/2025   Surgical Information   What procedure is being done? Intracept   Facility or Hospital where procedure/surgery will be performed: [facility]   Who is doing the procedure / surgery? Staff physician   Date of surgery / procedure: 8/6/2025   Time of surgery / procedure: 12:30   Where do you plan to recover after surgery? at home with family     Fax number for surgical facility: [phone]    Assessment & Plan     The proposed surgical procedure is considered LOW risk.    Assessment & Plan  Preop general physical exam  Patient is aware that specific preoperative instruction and all postoperative guidance will come from his procedural team.  We discussed n.p.o. recommendations, antiseptic bathing and all medication adjustments indicated.  All questions were answered.       Vertebrogenic low back pain  Indication for planned procedure.  Patient follows with [facility].  Notes reviewed. Back pain has persisted despite routine physical therapy, oral medications and epidural steroid injections. He is trying to both minimize oral medications as well as avoid surgical intervention, hence the plan for this upcoming Intracept procedure.        Claudication  Current therapies include rosuvastatin which he tolerates without side effects. Continue as prescribed.        Essential hypertension  Well-managed as evidenced by BP of 103/65 today in clinic.  Current therapies include lisinopril 10 mg daily.  He is up-to-date on monitoring labs.  Continue as prescribed.        Opioid type dependence, continuous (H)  Managed by [facility]. Notes reviewed.        Hypothyroidism, unspecified type  Recent labs show normalization  of T4 and therapeutic TSH.  Will continue current therapies of levothyroxine 88 mcg daily.  Continue as prescribed prior to his procedure.  Orders:    TSH with free T4 reflex; Future    Frequent noctunal urination  Patient has upcoming appointment scheduled with urology, however this is not until September.  Symptoms are progressively worsening and interfering with daily function.  Discussed referral to [facility] in the attempt to be have him seen sooner and he is open to this.  Referral faxed today.  Orders:    Adult Urology  Referral; Future    Possible Sleep Apnea: Fatigue secondary to frequent nighttime awakenings and hypothyroidism.  No concern for sleep apnea.         - No identified additional risk factors other than previously addressed    Preoperative Medication Instructions  Take all scheduled medications on the day of surgery EXCEPT for modifications listed below:   - Herbal medications and vitamins: DO NOT TAKE 14 days prior to surgery.   - ACE/ARB/ARNI (lisinopril, enalapril, losartan, valsartan, olmesartan, sacubritril/valsartan) : Continue without modification (e.g., MAC anesthesia, neurosurgery, spine surgery, heart failure, or labile hypertension with risk of hypertension).   - Statins (atorvastatin, simvastatin, pravastatin) : Continue taking on the day of surgery.    - Opioids: Continue without modification.     Recommendation  Approval given to proceed with proposed procedure, without further diagnostic evaluation.    The longitudinal plan of care for the diagnosis(es)/condition(s) as documented were addressed during this visit. Due to the added complexity in care, I will continue to support Phani in the subsequent management and with ongoing continuity of care.    Subjective   Phani is a 75 year old, presenting for the following:  Pre-Op Exam (Back 08/06/25 at  pain cliinic  [id number])          7/14/2025    12:43 PM   Additional Questions   Roomed by sac   Accompanied by  self         7/14/2025   Forms   Any forms needing to be completed Yes     HPI: Back pain has persisted despite routine physical therapy, oral medications and epidural steroid injections. He is trying to both minimize oral medications as well as avoid surgical intervention, hence the plan for this upcoming Intracept procedure.           7/14/2025   Pre-Op Questionnaire   Have you ever had a heart attack or stroke? No   Have you ever had surgery on your heart or blood vessels, such as a stent placement, a coronary artery bypass, or surgery on an artery in your head, neck, heart, or legs? No   Do you have chest pain with activity? No   Do you have a history of heart failure? No   Do you currently have a cold, bronchitis or symptoms of other infection? No   Do you have a cough, shortness of breath, or wheezing? No   Do you or anyone in your family have previous history of blood clots? No   Do you or does anyone in your family have a serious bleeding problem such as prolonged bleeding following surgeries or cuts? No   Have you ever had problems with anemia or been told to take iron pills? No   Have you had any abnormal blood loss such as black, tarry or bloody stools? No   Have you ever had a blood transfusion? No   Are you willing to have a blood transfusion if it is medically needed before, during, or after your surgery? Yes   Have you or any of your relatives ever had problems with anesthesia? No   Do you have sleep apnea, excessive snoring or daytime drowsiness? (!) YES   Do you have a CPAP machine? (!) NO - drowsiness secondary to frequent nighttime wakenings.   Do you have any artifical heart valves or other implanted medical devices like a pacemaker, defibrillator, or continuous glucose monitor? No   Do you have artificial joints? No   Are you allergic to latex? No     Advance Care Planning    Discussed advance care planning with patient; however, patient declined at this time.    Preoperative Review of     reviewed - controlled substances reflected in medication list.      Status of Chronic Conditions:  See problem list for active medical problems.  Problems all longstanding and stable, except as noted/documented.  See ROS for pertinent symptoms related to these conditions.    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  Formatting of this note might be different from the original.  Created by Conversion      Lumbar Disc Degeneration 11/30/2021     Priority: Medium     Created by Conversion      Formatting of this note might be different from the original.  Created by Conversion    Formatting of this note might be different from the original.  Formatting of this note might be different from the original.  Created by Conversion      PVD (posterior vitreous detachment), unspecified laterality 02/20/2020     Priority: Medium    Cortical age-related cataract of both eyes 02/20/2020     Priority: Medium    Hypothyroidism, unspecified type 12/03/2015     Priority: Medium      Past Medical History:   Diagnosis Date    Food intolerance in adult     Hearing problem 2007    Tinnitus and some hearing loss    Hepatitis 2005?    Testing no trace  Hep C now    Hypertension     Malignant neoplasm of base of tongue (H)     Thyroid disease 2025     Past Surgical History:   Procedure Laterality Date    ABDOMEN SURGERY      COLONOSCOPY      EYE SURGERY      HC REMOVAL GALLBLADDER      Description: Cholecystectomy;  Recorded: 2011;    GA STOMACH SURGERY PROCEDURE UNLISTED  ?    Gall bladder     Current Outpatient Medications   Medication Sig Dispense Refill    aspirin (ASA) 325 MG tablet Take 1 tablet by mouth      cyclobenzaprine (FLEXERIL) 10 MG tablet Take 10 mg by mouth 3 times daily as needed      HYDROcodone-acetaminophen (NORCO)  MG per tablet       levothyroxine (SYNTHROID/LEVOTHROID) 88 MCG tablet Take 1 tablet (88 mcg) by mouth every morning (before breakfast). 90 tablet 3    lisinopril (ZESTRIL) 10 MG tablet Take 1 tablet (10 mg) by mouth daily 90 tablet 0    Multiple Vitamins-Minerals (ONE-A-DAY 50 PLUS PO) Take 1 tablet by mouth daily      Probiotic Product (PROBIOTIC BLEND PO) Take 2 capsules by mouth daily Prebiotic and Probiotic blend      sodium fluoride dental gel (PREVIDENT) 1.1 % GEL topical gel USE AS DIRECTED OVERNIGHT. DISPENSE A SMALL AMOUNT INTO UPPER AND LOWER TRAYS. DO NOT EAT OR DRINK AFTER      tamsulosin (FLOMAX) 0.4 MG capsule Take 2 capsules (0.8 mg) by mouth daily. 180 capsule 1    rosuvastatin (CRESTOR) 20 MG tablet Take 1 tablet (20 mg) by mouth at bedtime. 90 tablet 3       Allergies   Allergen Reactions    Other Food Allergy Unknown     Many food intolerances        Social History     Tobacco Use    Smoking status: Former     Current packs/day: 0.00     Average packs/day: 1.3 packs/day for 27.0 years (36.0 ttl pk-yrs)     Types: Cigarettes     Start date: 1966     Quit date: 1984     Years since quittin.5     Passive exposure: Never    Smokeless tobacco: Never   Substance Use Topics    Alcohol use: Not Currently     History   Drug Use Unknown         Review of Systems  Constitutional, HEENT,  "cardiovascular, pulmonary, gi and gu systems are negative, except as otherwise noted.    Objective    /65 (BP Location: Left arm, Patient Position: Sitting, Cuff Size: Adult Large)   Pulse 81   Temp 98.4  F (36.9  C) (Oral)   Resp 16   Ht 1.778 m (5' 10\")   Wt 80.8 kg (178 lb 1 oz)   SpO2 97%   BMI 25.55 kg/m     Estimated body mass index is 25.55 kg/m  as calculated from the following:    Height as of this encounter: 1.778 m (5' 10\").    Weight as of this encounter: 80.8 kg (178 lb 1 oz).    Physical Exam  GENERAL: alert and no distress  EYES: Eyes grossly normal to inspection, PERRL and conjunctivae and sclerae normal  HENT: ear canals and TM's normal, nose and mouth without ulcers or lesions  NECK: no adenopathy, no asymmetry, masses, or scars  RESP: lungs clear to auscultation - no rales, rhonchi or wheezes  CV: regular rate and rhythm, normal S1 S2, no S3 or S4, no murmur, click or rub, no peripheral edema  ABDOMEN: soft, nontender, no hepatosplenomegaly, no masses and bowel sounds normal  MS: no gross musculoskeletal defects noted, no edema  SKIN: no suspicious lesions or rashes  NEURO: Normal strength and tone, mentation intact and speech normal  PSYCH: mentation appears normal, affect flat.    Recent Labs   Lab Test 04/15/25  1526 12/04/24  1525 07/26/24  1549   HGB  --   --  11.6*   PLT  --   --  143*     --  140   POTASSIUM 4.3  --  4.2   CR 1.10 1.2 0.86   A1C 5.6  --  4.8      Diagnostics  No labs were ordered during this visit.   No EKG required, no history of coronary heart disease, significant arrhythmia, peripheral arterial disease or other structural heart disease.    Revised Cardiac Risk Index (RCRI)  The patient has the following serious cardiovascular risks for perioperative complications:   - No serious cardiac risks = 0 points     RCRI Interpretation: 0 points: Class I (very low risk - 0.4% complication rate)         Signed Electronically by: HIREN Moody CNP " copy of this evaluation report is provided to the requesting physician.

## 2025-07-22 ENCOUNTER — MYC MEDICAL ADVICE (OUTPATIENT)
Dept: FAMILY MEDICINE | Facility: CLINIC | Age: 75
End: 2025-07-22
Payer: MEDICARE

## 2025-08-01 ENCOUNTER — TRANSFERRED RECORDS (OUTPATIENT)
Dept: HEALTH INFORMATION MANAGEMENT | Facility: CLINIC | Age: 75
End: 2025-08-01
Payer: MEDICARE

## 2025-08-05 ENCOUNTER — TRANSFERRED RECORDS (OUTPATIENT)
Dept: HEALTH INFORMATION MANAGEMENT | Facility: CLINIC | Age: 75
End: 2025-08-05
Payer: MEDICARE

## 2025-08-20 ENCOUNTER — TRANSFERRED RECORDS (OUTPATIENT)
Dept: HEALTH INFORMATION MANAGEMENT | Facility: CLINIC | Age: 75
End: 2025-08-20
Payer: MEDICARE

## 2025-08-28 ENCOUNTER — MYC MEDICAL ADVICE (OUTPATIENT)
Dept: FAMILY MEDICINE | Facility: CLINIC | Age: 75
End: 2025-08-28
Payer: MEDICARE

## 2025-08-28 DIAGNOSIS — E03.9 HYPOTHYROIDISM, UNSPECIFIED TYPE: ICD-10-CM
